# Patient Record
Sex: MALE | Race: WHITE | NOT HISPANIC OR LATINO | Employment: FULL TIME | URBAN - METROPOLITAN AREA
[De-identification: names, ages, dates, MRNs, and addresses within clinical notes are randomized per-mention and may not be internally consistent; named-entity substitution may affect disease eponyms.]

---

## 2017-01-10 ENCOUNTER — OFFICE VISIT (OUTPATIENT)
Dept: OTOLARYNGOLOGY | Facility: CLINIC | Age: 47
End: 2017-01-10
Payer: COMMERCIAL

## 2017-01-10 VITALS
WEIGHT: 195 LBS | BODY MASS INDEX: 25.03 KG/M2 | TEMPERATURE: 97.8 F | HEIGHT: 74 IN | SYSTOLIC BLOOD PRESSURE: 125 MMHG | DIASTOLIC BLOOD PRESSURE: 80 MMHG | HEART RATE: 67 BPM

## 2017-01-10 DIAGNOSIS — J32.0 CHRONIC MAXILLARY SINUSITIS: ICD-10-CM

## 2017-01-10 DIAGNOSIS — J34.2 DEVIATED NASAL SEPTUM: Primary | ICD-10-CM

## 2017-01-10 PROCEDURE — 99243 OFF/OP CNSLTJ NEW/EST LOW 30: CPT | Performed by: OTOLARYNGOLOGY

## 2017-01-10 ASSESSMENT — PAIN SCALES - GENERAL: PAINLEVEL: NO PAIN (0)

## 2017-01-10 NOTE — PROGRESS NOTES
History of Present Illness - Franklin Mejia is a 46 year old male seen in consultation at the request of Dr. Esquivel for persistent trouble with his nasal breathing and concerns about chronic sinusitis. He once saw Dr. Weinberg over 15 years ago and was found to have sinus congestion, but did not pursue intervention. He feels that his face gets pressure, and he gets headaches frequently. He also complains of frequent tinnitus and ear fullness. All of these symptoms are intermittent. He finds they are improved with antihistamines, but prior allergy testing has been negative.    He also feels that his nose was broken several times as a child due to basketball. He has been exercising all right and does not feel that his nasal breathing is particularly limiting presently.    Past Medical History -   Patient Active Problem List   Diagnosis     GERD (gastroesophageal reflux disease)     Overweight (BMI 25.0-29.9)     NEAL (generalized anxiety disorder)     \  Current Medications -   Current outpatient prescriptions:      Multiple Vitamins-Minerals (CENTRUM PO), Take 1 tablet by mouth daily, Disp: , Rfl:     Allergies - No Known Allergies    Social History -   Social History     Social History     Marital Status:      Spouse Name: N/A     Number of Children: N/A     Years of Education: N/A     Social History Main Topics     Smoking status: Former Smoker     Quit date: 08/26/2006     Smokeless tobacco: Current User     Types: Chew      Comment: lives in smoke free household.     Alcohol Use: 0.0 oz/week     0 Standard drinks or equivalent per week      Comment: occasional      Drug Use: No     Sexual Activity:     Partners: Female     Other Topics Concern     Parent/Sibling W/ Cabg, Mi Or Angioplasty Before 65f 55m? No     Social History Narrative       Family History -   Family History   Problem Relation Age of Onset     Lipids Mother      Hypertension Father      Macular Degeneration Father      DIABETES  "Father      CEREBROVASCULAR DISEASE No family hx of      Thyroid Disease No family hx of      Glaucoma No family hx of      CANCER No family hx of      DIABETES Brother        Review of Systems - As per HPI and PMHx, otherwise 7 system review of the head and neck negative. 10+ system review negative.    Physical Exam  /80 mmHg  Pulse 67  Temp(Src) 97.8  F (36.6  C) (Oral)  Ht 1.88 m (6' 2\")  Wt 88.451 kg (195 lb)  BMI 25.03 kg/m2  General - The patient is well nourished and well developed, and appears to have good nutritional status.  Alert and oriented to person and place, answers questions and cooperates with examination appropriately.   Head and Face - Normocephalic and atraumatic, with no gross asymmetry noted of the contour of the facial features.  The facial nerve is intact, with strong symmetric movements.  Voice and Breathing - The patient was breathing comfortably without the use of accessory muscles. There was no wheezing, stridor, or stertor.  The patients voice was clear and strong, and had appropriate pitch and quality.  Ears - Bilateral pinna and EACs with normal appearing overlying skin. Tympanic membrane intact with good mobility on pneumatic otoscopy bilaterally. Bony landmarks of the ossicular chain are normal. The tympanic membranes are normal in appearance. No retraction, perforation, or masses.  No fluid or purulence was seen in the external canal or the middle ear.   Eyes - Extraocular movements intact.  Sclera were not icteric or injected, conjunctiva were pink and moist.  Mouth - Examination of the oral cavity showed pink, healthy oral mucosa. No lesions or ulcerations noted.  The tongue was mobile and midline, and the dentition were in good condition.    Throat - The walls of the oropharynx were smooth, pink, moist, symmetric, and had no lesions or ulcerations.  The tonsillar pillars and soft palate were symmetric.  The uvula was midline on elevation.  Neck - Normal midline " excursion of the laryngotracheal complex during swallowing.  Full range of motion on passive movement.  Palpation of the occipital, submental, submandibular, internal jugular chain, and supraclavicular nodes did not demonstrate any abnormal lymph nodes or masses.  The carotid pulse was palpable bilaterally.  Palpation of the thyroid was soft and smooth, with no nodules or goiter appreciated.  The trachea was mobile and midline.  Nose - External contour demonstrates thickened nasal bones bilaterally but bony dorsum in the midline. Nasal tip deviates leftward severely, and there is depression of the right upper lateral cartilage. Tip support is adequate, but the caudal septum is sharply deviated from left to right. Left nasal airway is better than right.      Assessment - Franklin Mejia is a 46 year old male with traumatic nasal deformity. I believe he would need a septorhinoplasty for best treatment of the nasal obstruction symptoms, but he is apparently not particularly bothered by his nasal breathing at this point. I have requested that he continue nasal steroids for 3 weeks and get a CT Sinus to review together in order to investigate for significant paranasal sinus congestion.       Dr. Hussain Nuñez MD  Otolaryngology  [facility]

## 2017-01-10 NOTE — NURSING NOTE
"Initial /80 mmHg  Pulse 67  Temp(Src) 97.8  F (36.6  C) (Oral)  Ht 1.88 m (6' 2\")  Wt 88.451 kg (195 lb)  BMI 25.03 kg/m2 Estimated body mass index is 25.03 kg/(m^2) as calculated from the following:    Height as of this encounter: 1.88 m (6' 2\").    Weight as of this encounter: 88.451 kg (195 lb). .    Jacey Amanda CMA    "

## 2017-01-10 NOTE — MR AVS SNAPSHOT
After Visit Summary   1/10/2017    Franklin Mejia    MRN: [id number]           Patient Information     Date Of Birth          1970        Visit Information        Provider Department      1/10/2017 4:00 PM Hussain Nuñez MD [facility]        Today's Diagnoses     Deviated nasal septum    -  1     Chronic maxillary sinusitis           Care Instructions    Per Physician's instructions          Follow-ups after your visit        Your next 10 appointments already scheduled     Feb 14, 2017 11:00 AM   New Visit with DOC Cooper   [facility]    [address]  [facility] [address]   [phone]              Future tests that were ordered for you today     Open Future Orders        Priority Expected Expires Ordered    CT Maxillofacial w/o Contrast Routine 1/24/2017 1/10/2018 1/10/2017            Who to contact     If you have questions or need follow up information about today's clinic visit or your schedule please contact [facility] directly at [phone].  Normal or non-critical lab and imaging results will be communicated to you by [employer], letter or phone within 4 business days after the clinic has received the results. If you do not hear from us within 7 days, please contact the clinic through [employer] or phone. If you have a critical or abnormal lab result, we will notify you by phone as soon as possible.  Submit refill requests through [employer] or call your pharmacy and they will forward the refill request to us. Please allow 3 business days for your refill to be completed.          Additional Information About Your Visit        [employer] Information     [employer] gives you secure access to your electronic health record. If you see a primary care provider, you can also send messages to your care team and make appointments. If you have questions, please call your primary care clinic.  If you  "do not have a primary care provider, please call [phone] and they will assist you.        Care EveryWhere ID     This is your Care EveryWhere ID. This could be used by other organizations to access your [place] medical records  [id number]        Your Vitals Were     Pulse Temperature Height BMI (Body Mass Index)          67 97.8  F (36.6  C) (Oral) 1.88 m (6' 2\") 25.03 kg/m2         Blood Pressure from Last 3 Encounters:   01/10/17 125/80   12/21/16 115/67   06/16/16 120/74    Weight from Last 3 Encounters:   01/10/17 88.451 kg (195 lb)   12/21/16 91.173 kg (201 lb)   06/16/16 91.173 kg (201 lb)               Primary Care Provider Office Phone # Fax #    Branden Esquivel [phone]       [place] [address] [facility] [address]        Thank you!     Thank you for choosing [facility]  for your care. Our goal is always to provide you with excellent care. Hearing back from our patients is one way we can continue to improve our services. Please take a few minutes to complete the written survey that you may receive in the mail after your visit with us. Thank you!             Your Updated Medication List - Protect others around you: Learn how to safely use, store and throw away your medicines at www.disposemymeds.org.          This list is accurate as of: 1/10/17  4:52 PM.  Always use your most recent med list.                   Brand Name Dispense Instructions for use    CENTRUM PO      Take 1 tablet by mouth daily         "

## 2017-01-30 ENCOUNTER — HOSPITAL ENCOUNTER (OUTPATIENT)
Dept: CT IMAGING | Facility: CLINIC | Age: 47
Discharge: HOME | End: 2017-01-30
Attending: OTOLARYNGOLOGY | Admitting: OTOLARYNGOLOGY
Payer: COMMERCIAL

## 2017-01-30 DIAGNOSIS — J32.0 CHRONIC MAXILLARY SINUSITIS: ICD-10-CM

## 2017-01-30 PROCEDURE — 70486 CT MAXILLOFACIAL W/O DYE: CPT

## 2017-02-14 ENCOUNTER — OFFICE VISIT (OUTPATIENT)
Dept: PSYCHOLOGY | Facility: CLINIC | Age: 47
End: 2017-02-14
Attending: FAMILY MEDICINE
Payer: COMMERCIAL

## 2017-02-14 DIAGNOSIS — F41.1 GENERALIZED ANXIETY DISORDER: ICD-10-CM

## 2017-02-14 DIAGNOSIS — F98.8 ATTENTION DEFICIT DISORDER WITHOUT HYPERACTIVITY: Primary | ICD-10-CM

## 2017-02-14 PROCEDURE — 90791 PSYCH DIAGNOSTIC EVALUATION: CPT | Performed by: MARRIAGE & FAMILY THERAPIST

## 2017-02-14 ASSESSMENT — PATIENT HEALTH QUESTIONNAIRE - PHQ9: 5. POOR APPETITE OR OVEREATING: MORE THAN HALF THE DAYS

## 2017-02-14 ASSESSMENT — ANXIETY QUESTIONNAIRES
6. BECOMING EASILY ANNOYED OR IRRITABLE: MORE THAN HALF THE DAYS
7. FEELING AFRAID AS IF SOMETHING AWFUL MIGHT HAPPEN: MORE THAN HALF THE DAYS
3. WORRYING TOO MUCH ABOUT DIFFERENT THINGS: NEARLY EVERY DAY
1. FEELING NERVOUS, ANXIOUS, OR ON EDGE: NEARLY EVERY DAY
IF YOU CHECKED OFF ANY PROBLEMS ON THIS QUESTIONNAIRE, HOW DIFFICULT HAVE THESE PROBLEMS MADE IT FOR YOU TO DO YOUR WORK, TAKE CARE OF THINGS AT HOME, OR GET ALONG WITH OTHER PEOPLE: SOMEWHAT DIFFICULT
2. NOT BEING ABLE TO STOP OR CONTROL WORRYING: NEARLY EVERY DAY
GAD7 TOTAL SCORE: 17
5. BEING SO RESTLESS THAT IT IS HARD TO SIT STILL: MORE THAN HALF THE DAYS

## 2017-02-14 NOTE — Clinical Note
Client is getting started in the counseling center and will be working on struggles with Attention Deficit Disorder.  Thank you!

## 2017-02-14 NOTE — MR AVS SNAPSHOT
MRN:[id number]                      After Visit Summary   2/14/2017    Franklin Mejia    MRN: [id number]           Visit Information        Provider Department      2/14/2017 11:00 AM Cherri Wooten [facility]      Your next 10 appointments already scheduled     Mar 01, 2017 12:00 PM CST   Return Visit with Cherri Wooten [facility] ([facility]    [address] [id number] [phone]            Mar 28, 2017  4:00 PM CDT   Return Visit with Cherri Wooten [facility] ([facility])    [address] [id number] [phone]            Apr 13, 2017  4:00 PM CDT   Return Visit with Cherri Wooten [facility] ([facility]    [address] [place] [id number] [phone]              MyChart Information     [employer] gives you secure access to your electronic health record. If you see a primary care provider, you can also send messages to your care team and make appointments. If you have questions, please call your primary care clinic.  If you do not have a primary care provider, please call [phone] and they will assist you.        Care EveryWhere ID     This is your Care EveryWhere ID. This could be used by other organizations to access your [place] medical records  [id number]

## 2017-02-15 ASSESSMENT — PATIENT HEALTH QUESTIONNAIRE - PHQ9: SUM OF ALL RESPONSES TO PHQ QUESTIONS 1-9: 6

## 2017-02-15 ASSESSMENT — ANXIETY QUESTIONNAIRES: GAD7 TOTAL SCORE: 17

## 2017-02-16 ENCOUNTER — FCC EXTENDED DOCUMENTATION (OUTPATIENT)
Dept: PSYCHOLOGY | Facility: CLINIC | Age: 47
End: 2017-02-16

## 2017-02-16 NOTE — PROGRESS NOTES
Adult Intake Structured Interview  Standard Diagnostic Assessment      CLIENT'S NAME: Franklin Mejia  MRN:   [id number]  :   1970  ACCT. NUMBER: [id number]  DATE OF SERVICE: 17      Identifying Information:  Client is a 46 year old, ,  male. Client was referred for counseling by his Primary Care Provider. Client is currently employed full time. Client attended the session alone.       Client's Statement of Presenting Concern:  Client reports the reason for seeking therapy at this time as hig hstress levels and struggles with attention issues.  Client has a past diagnosis of Attention Deficit Disorder.  Client stated that his symptoms have resulted in the following functional impairments: home life with family, organization, relationship(s), self-care and work / vocational responsibilities      History of Presenting Concern:  Client reports that these problem(s) began years ago but more recently started to affect many different aspects of his life. Client has attempted to resolve these concerns in the past through medication, counseling and testing. Client reports that other professional(s) are involved in providing support / services. Client is currently working with his Primary Care Provider.        Social History:  Client reported he grew up in [place], [place]. They were the third born of 6 children. Parents were always .  Clients father passed away 3 years ago. Client reported that his childhood was good. Client described his current relationships with family of origin as okay in general.    Client reported a history of 2 committed relationships or marriages. Client has been  for unknown years. Client reported having 2 children and 2 adult step children. Client identified some stable and  meaningful social connections. Client reported that he has been involved with the legal system. This involved child custody in the past.  Client's highest education level was some college. Client did identify the following learning problems: attention, concentration, reading and writing. There are no ethnic, cultural or Zoroastrian factors that may be relevant for therapy. Client identified his preferred language to be English. Client reported he does not need the assistance of an  or other support involved in therapy. Modifications will not be used to assist communication in therapy. Client did not serve in the .     Client reports family history includes DIABETES in his brother and father; Hypertension in his father; Lipids in his mother; Macular Degeneration in his father. There is no history of CEREBROVASCULAR DISEASE, Thyroid Disease, Glaucoma, or CANCER.    Mental Health History:  Client reported the following biological family members or relatives with mental health issues: Siblings experienced ADHD and Anxiety.  Client previously received the following mental health diagnosis: Anxiety and ADD.  Client has received the following mental health services in the past: counseling, medication(s) from physician / PCP and ADHD testing.  Hospitalizations: None.  Client is currently receiving the following services: physician / PCP.      Chemical Health History:  Client reported no family history of chemical health issues. Client has not received chemical dependency treatment in the past. Client is not currently receiving any chemical dependency treatment. Client reports no problems as a result of their drinking / drug use.      Client Reports:  Client reports using alcohol 2 times per month and has 1-2 drinks at a time. Client first started drinking at age did not specify.  Client reports using tobacco 7 times per day. Client started using tobacco at age did not specify.  Client uses chewing  tobacco..  Client denies using marijuana.  Client reports using caffeine 1 times per day and drinks 1 at a time. Client started using caffeine at age did not specify.  Client denies using street drugs.  Client denies the non-medical use of prescription or over the counter drugs.    CAGE: None of the patient's responses to the CAGE screening were positive / Negative CAGE score   Based on the negative Cage-Aid score and clinical interview there  are not indications of drug or alcohol abuse.    Discussed the general effects of drugs and alcohol on health and well-being. Therapist gave client printed information about the effects of chemical use on his health and well being.      Significant Losses / Trauma / Abuse / Neglect Issues:  There are indications or report of significant loss, trauma, abuse or neglect issues related to: death of father 3 years ago and divorce / relational changes in 2002.    Issues of possible neglect are not present.      Medical Issues:  Client has had a physical exam to rule out medical causes for current symptoms. Date of last physical exam was within the past year. Client was encouraged to follow up with PCP if symptoms were to develop. The client has a [facility] Primary Care Provider, who is named Branden Esquivel. The client reports not having a psychiatrist. Client reports no current medical concerns. The client reports the presence of chronic or episodic pain in the form of general body pain for getting older. The pain level is mild and has a frequency of every so often.. There are not significant nutritional concerns.     Client reports current meds as:   Current Outpatient Prescriptions   Medication Sig     Multiple Vitamins-Minerals (CENTRUM PO) Take 1 tablet by mouth daily     No current facility-administered medications for this visit.        Client Allergies:  No Known Allergies      Medical History:  No past medical history on file.      Medication Adherence:  N/A - Client does  not have prescribed psychiatric medications.    Client was provided recommendation to follow-up with prescribing physician.    Mental Status Assessment:  Appearance:   Appropriate   Eye Contact:   Good   Psychomotor Behavior: Normal   Attitude:   Cooperative   Orientation:   All  Speech  Rate / Production: Normal   Volume:  Normal   Mood:    Anxious Elevated   Affect:    Worrisome   Thought Content:  Clear   Thought Form:  Coherent Logical   Insight:    Good       Review of Symptoms:  Depression: Sleep Energy Concentration  [place]:  No symptoms  Psychosis: No symptoms  Anxiety: Worries Nervousness  Panic:  Sense of Impending Doom  Post Traumatic Stress Disorder: No symptoms  Obsessive Compulsive Disorder: No symptoms  Eating Disorder: No symptoms  ADD / ADHD: Attention Listening Task Completion Organization Distractiblity Forgetful          Safety Issues and Plan for Safety and Risk Management:  Client denies a history of suicidal ideation, suicide attempts, self-injurious behavior, homicidal ideation, homicidal behavior and and other safety concerns  Client denies current fears or concerns for personal safety.  Client denies current or recent suicidal ideation or behaviors.  Client denies current or recent homicidal ideation or behaviors.  Client denies current or recent self injurious behavior or ideation.  Client denies other safety concerns.  Client reports there are firearms in the house. The firearms are secured in a locked space.  A safety and risk management plan has not been developed at this time, however client was given the after-hours number / 911 should there be a change in any of these risk factors.    Client's Strengths and Limitations:  Client identified the following strengths or resources that will help him succeed in counseling: commitment to health and well being, friends / good social support, family support and intelligence. Client identified the following supports: family and friends. Things  that may interfere with the clients success in counseling include:None noted.        Diagnostic Criteria:    A. Excessive anxiety and worry about a number of events or activities (such as work or school performance).   B. The person finds it difficult to control the worry.  C. Select 3 or more symptoms (required for diagnosis). Only one item is required in children.  - Restlessness or feeling keyed up or on edge.   - Being easily fatigued.   - Difficulty concentrating or mind going blank.   - Irritability.   - Muscle tension.   - Sleep disturbance (difficulty falling or staying asleep, or restless unsatisfying sleep).   D. The focus of the anxiety and worry is not confined to features of an Axis I disorder.  E. The anxiety, worry, or physical symptoms cause clinically significant distress or impairment in social, occupational, or other important areas of functioning.   F. The disturbance is not due to the direct physiological effects of a substance (e.g., a drug of abuse, a medication) or a general medical condition (e.g., hyperthyroidism) and does not occur exclusively during a Mood Disorder, a Psychotic Disorder, or a Pervasive Developmental Disorder.   - The aformentioned symptoms began - year(s) ago and occurs 5 days per week and is experienced as moderate.  (1) Inattention: 6 or more of the following symptoms have persisted for at least 6 months to a degree that is inconsistent with developmental level and that negatively impacts directly on social and academic/occupational activities:  - Often fails to give close attention to details or makes careless mistakes in schoolwork, at work, or during other activities  - Often has difficulty sustaining attention in tasks or play activities  - Often does not follow through on instructions and fails to finish schoolwork, chores, or duties in the workplace  - Often has difficulty organizing tasks and activities  - Often avoids, dislikes, or is reluctant to engage in  tasks that require sustained mental effort  - Often loses things necessary for tasks or activities  - Is often easily distractedby extraneous stimuli  - Is often forgetful in daily activities       Functional Status:  Client's symptoms have caused and are causing reduced functional status in the following areas:   Activities of Daily Living   Financial management  Occupational / Vocational   Social / Relational       DSM5 Diagnoses: (Sustained by DSM5 Criteria Listed Above)  Diagnoses: Attention-Deficit/Hyperactivity Disorder 314.00 (F90.0) Predominantly inattentive presentation  300.02 (F41.1) Generalized Anxiety Disorder  Psychosocial & Contextual Factors: Some relational stress and work stress  WHODAS 2.0 (12 item)  This questionnaire asks about difficulties due to health conditions. Health conditions  include  disease or illnesses, other health problems that may be short or long lasting,  injuries, mental health or emotional problems, and problems with alcohol or drugs.      Think back over the past 30 days and answer these questions, thinking about how much  difficulty you had doing the following activities. For each question, please [place] only  one response.     S1 Standing for long periods such as 30 minutes? None = 1   S2 Taking care of household responsibilities? None = 1   S3 Learning a new task, for example, learning how to get to a new place? None = 1   S4 How much of a problem do you have joining community activities (for example, festivals, Yarsanism or other activities) in the same way as anyone else can? None = 1   S5 How much have you been emotionally affected by your health problems? None = 1           In the past 30 days, how much difficulty did you have in:   S6 Concentrating on doing something for ten minutes? Moderate = 3   S7 Walking a long distance such as a kilometer (or equivalent)? None = 1   S8 Washing your whole body? None = 1   S9 Getting dressed? None = 1   S10 Dealing with people you  do not know? None = 1   S11 Maintaining a friendship? None = 1   S12 Your day to day work? None = 1      H1 Overall, in the past 30 days, how many days were these difficulties present? Record number of days 0   H2 In the past 30 days, for how many days were you totally unable to carry out your usual activities or work because of any health condition? Record number of days 0   H3 In the past 30 days, not counting the days that you were totally unable, for how many days did you cut back or reduce your usual activities or work because of any health condition? Record number of days 0          Attendance Agreement:  Client has signed Attendance Agreement:Yes      Preliminary Treatment Plan:  The client reports no currently identified Tenriism, ethnic or cultural issues relevant to therapy.     services are not indicated.    Modifications to assist communication are not indicated.    The concerns identified by the client will be addressed in therapy.    Initial Treatment will focus on: Stress management, self-care and balance.    As a preliminary treatment goal, client will develop more effective coping skills to manage anxiety symptoms and will develop coping skills to effectively manage attention issues.    The focus of initial interventions will be to alleviate anxiety, increase ability to function adaptively, increase coping skills, teach CBT skills, teach communication skills, teach relaxation strategies and teach stress mangement techniques.    The client is receiving treatment / structured support from the following professional(s) / service and treatment. Collaboration will be initiated with: primary care physician.    Referral to another professional/service is not indicated at this time..    A Release of Information is not needed at this time.    Report to child / adult protection services was NA.    Client will have access to their [facility]' medical record.    Cherri Wooten,  TH February 16, 2017

## 2017-03-01 ENCOUNTER — OFFICE VISIT (OUTPATIENT)
Dept: PSYCHOLOGY | Facility: CLINIC | Age: 47
End: 2017-03-01
Payer: COMMERCIAL

## 2017-03-01 DIAGNOSIS — F98.8 ATTENTION DEFICIT DISORDER (ADD) WITHOUT HYPERACTIVITY: Primary | ICD-10-CM

## 2017-03-01 DIAGNOSIS — F41.1 GENERALIZED ANXIETY DISORDER: ICD-10-CM

## 2017-03-01 PROCEDURE — 90834 PSYTX W PT 45 MINUTES: CPT | Performed by: MARRIAGE & FAMILY THERAPIST

## 2017-03-01 ASSESSMENT — ANXIETY QUESTIONNAIRES
5. BEING SO RESTLESS THAT IT IS HARD TO SIT STILL: SEVERAL DAYS
7. FEELING AFRAID AS IF SOMETHING AWFUL MIGHT HAPPEN: SEVERAL DAYS
6. BECOMING EASILY ANNOYED OR IRRITABLE: SEVERAL DAYS
IF YOU CHECKED OFF ANY PROBLEMS ON THIS QUESTIONNAIRE, HOW DIFFICULT HAVE THESE PROBLEMS MADE IT FOR YOU TO DO YOUR WORK, TAKE CARE OF THINGS AT HOME, OR GET ALONG WITH OTHER PEOPLE: SOMEWHAT DIFFICULT
GAD7 TOTAL SCORE: 8
3. WORRYING TOO MUCH ABOUT DIFFERENT THINGS: SEVERAL DAYS
1. FEELING NERVOUS, ANXIOUS, OR ON EDGE: MORE THAN HALF THE DAYS
2. NOT BEING ABLE TO STOP OR CONTROL WORRYING: SEVERAL DAYS

## 2017-03-01 ASSESSMENT — PATIENT HEALTH QUESTIONNAIRE - PHQ9: 5. POOR APPETITE OR OVEREATING: SEVERAL DAYS

## 2017-03-01 NOTE — MR AVS SNAPSHOT
MRN:[id number]                      After Visit Summary   3/1/2017    Franklin Mejia    MRN: [id number]           Visit Information        Provider Department      3/1/2017 12:00 PM Cherri Wooten [facility]      Your next 10 appointments already scheduled     Mar 28, 2017  4:00 PM CDT   Return Visit with Cherri Wooten [facility]    [address] [address] [phone]            Apr 13, 2017  4:00 PM CDT   Return Visit with Cherri Wooten [facility]    [address] [id number] [phone]            May 01, 2017  4:00 PM CDT   Return Visit with Cherri Wooten [facility]    [address] [id number] [phone]              MyChart Information     [employer] gives you secure access to your electronic health record. If you see a primary care provider, you can also send messages to your care team and make appointments. If you have questions, please call your primary care clinic.  If you do not have a primary care provider, please call [phone] and they will assist you.        Care EveryWhere ID     This is your Care EveryWhere ID. This could be used by other organizations to access your [facility] medical records  [id number]

## 2017-03-01 NOTE — PROGRESS NOTES
Progress Note    Client Name: Franklin Mejia  Date: 3-1-17         Service Type: Individual      Session Start Time: 12pm  Session End Time: 1250pm      Session Length: 50min     Session #: 2     Attendees: Client attended alone    Treatment Plan Last Reviewed: 3-1-17  PHQ-9 / NEAL-7 : 3-1-17     DATA      Progress Since Last Session (Related to Symptoms / Goals / Homework):   Symptoms: Improved- Client has been addressing struggles with anxiety and ADD.      Homework: Completed in session      Episode of Care Goals: Minimal progress - ACTION (Actively working towards change); Intervened by reinforcing change plan / affirming steps taken     Current / Ongoing Stressors and Concerns:   -Client has been struggling with ADD and anxiety for years but it has been more on an issue the last year.              -Client has been working on some long term career planning.              -Client has been addressing some minor relational issues with his spouse.       Treatment Objective(s) Addressed in This Session:   Defining goals       Intervention:   Solution Focused: - Client is able to outline some self-care tools as well as personal limits he has.  He has a long term plan for career path.  He is working to address blended family issues at home.          ASSESSMENT: Current Emotional / Mental Status (status of significant symptoms):   Risk status (Self / Other harm or suicidal ideation)   Client denies current fears or concerns for personal safety.   Client denies current or recent suicidal ideation or behaviors.   Client denies current or recent homicidal ideation or behaviors.   Client denies current or recent self injurious behavior or ideation.   Client denies other safety concerns.   A safety and risk management plan has not been developed at this time, however client was given the after-hours number / 911 should there be a change in any of these risk  factors.     Appearance:   Appropriate    Eye Contact:   Good    Psychomotor Behavior: Normal    Attitude:   Cooperative    Orientation:   All   Speech    Rate / Production: Normal     Volume:  Normal    Mood:    Anxious    Affect:    Appropriate    Thought Content:  Clear    Thought Form:  Coherent  Logical    Insight:    Good      Medication Review:   No current psychiatric medications prescribed     Medication Compliance:   NA     Changes in Health Issues:   None reported     Chemical Use Review:   Substance Use: Chemical use reviewed, no active concerns identified      Tobacco Use: No change in amount of tobacco use since last session.  Reviewed information and resources for quitting     Collateral Reports Completed:   Not Applicable    PLAN: (Client Tasks / Therapist Tasks / Other)  Client will return in three weeks and is on the waiting list.  He will focus on his self-care routine.  He will continue to set long term career goals.  He will discuss blended family issues in other sessions.          Cherri Wooten,                                                          ________________________________________________________________________    Treatment Plan    Client's Name: Franklin Mejia  YOB: 1970    Date: 3-1-17    Diagnoses: Attention-Deficit/Hyperactivity Disorder 314.00 (F90.0) Predominantly inattentive presentation  300.02 (F41.1) Generalized Anxiety Disorder  Psychosocial & Contextual Factors: Some relational stress and work stress  WHODAS 2.0 (12 item)  This questionnaire asks about difficulties due to health conditions. Health conditions include disease or illnesses, other health problems that may be short or long lasting, injuries, mental health or emotional problems, and problems with alcohol or drugs.      Think back over the past 30 days and answer these questions, thinking about how much difficulty you had doing the following activities. For each question, please [place]  only one response.      S1 Standing for long periods such as 30 minutes? None = 1   S2 Taking care of household responsibilities? None = 1   S3 Learning a new task, for example, learning how to get to a new place? None = 1   S4 How much of a problem do you have joining community activities (for example, festivals, Voodoo or other activities) in the same way as anyone else can? None = 1   S5 How much have you been emotionally affected by your health problems? None = 1               In the past 30 days, how much difficulty did you have in:   S6 Concentrating on doing something for ten minutes? Moderate = 3   S7 Walking a long distance such as a kilometer (or equivalent)? None = 1   S8 Washing your whole body? None = 1   S9 Getting dressed? None = 1   S10 Dealing with people you do not know? None = 1   S11 Maintaining a friendship? None = 1   S12 Your day to day work? None = 1       H1 Overall, in the past 30 days, how many days were these difficulties present? Record number of days 0   H2 In the past 30 days, for how many days were you totally unable to carry out your usual activities or work because of any health condition? Record number of days 0   H3 In the past 30 days, not counting the days that you were totally unable, for how many days did you cut back or reduce your usual activities or work because of any health condition? Record number of days 0              Referral / Collaboration:  Referral to another professional/service is not indicated at this time..    Anticipated number of session or this episode of care: 4-8      MeasurableTreatment Goal(s) related to diagnosis / functional impairment(s)  Goal 1: Client will address struggles with escalating life stressors.      Objective #A (Client Action)    Client will develop a more concrete self-care plan.    Status: New - Date: 3-1-17     Intervention(s)  Therapist will use CBT and solution focused therapy .    Objective #B  Client will work towards more of  a balanced between home and work.  Status: New - Date: 3-1-17     Intervention(s)  Therapist will use CBT and solution focused therapy .    Objective #C  Client will focus on more future focused career planning.  Status: New - Date: 3-1-17     Intervention(s)  Therapist will use solution focused therapy .          Client has reviewed and agreed to the above plan.      Cherri Wooten, TH  March 1, 2017

## 2017-03-02 ASSESSMENT — ANXIETY QUESTIONNAIRES: GAD7 TOTAL SCORE: 8

## 2017-03-02 ASSESSMENT — PATIENT HEALTH QUESTIONNAIRE - PHQ9: SUM OF ALL RESPONSES TO PHQ QUESTIONS 1-9: 6

## 2017-03-28 ENCOUNTER — OFFICE VISIT (OUTPATIENT)
Dept: PSYCHOLOGY | Facility: CLINIC | Age: 47
End: 2017-03-28
Payer: COMMERCIAL

## 2017-03-28 DIAGNOSIS — F41.1 GENERALIZED ANXIETY DISORDER: Primary | ICD-10-CM

## 2017-03-28 PROCEDURE — 90834 PSYTX W PT 45 MINUTES: CPT | Performed by: MARRIAGE & FAMILY THERAPIST

## 2017-03-28 NOTE — MR AVS SNAPSHOT
MRN:[id number]                      After Visit Summary   3/28/2017    Franklin Mejia    MRN: [id number]           Visit Information        Provider Department      3/28/2017 4:00 PM Cherri Wooten [facility] Generic      Your next 10 appointments already scheduled     Apr 13, 2017  4:00 PM CDT   Return Visit with DOC Cooper   [facility] ([facility]    [address] [place] [id number] [phone]            May 01, 2017  4:00 PM CDT   Return Visit with DOC Cooper   [facility] ([facility])    [address] [place] [id number] [phone]              MyChart Information     [employer] gives you secure access to your electronic health record. If you see a primary care provider, you can also send messages to your care team and make appointments. If you have questions, please call your primary care clinic.  If you do not have a primary care provider, please call [phone] and they will assist you.        Care EveryWhere ID     This is your Care EveryWhere ID. This could be used by other organizations to access your [facility] medical records  [id number]

## 2017-03-29 ASSESSMENT — ANXIETY QUESTIONNAIRES
2. NOT BEING ABLE TO STOP OR CONTROL WORRYING: SEVERAL DAYS
7. FEELING AFRAID AS IF SOMETHING AWFUL MIGHT HAPPEN: NOT AT ALL
IF YOU CHECKED OFF ANY PROBLEMS ON THIS QUESTIONNAIRE, HOW DIFFICULT HAVE THESE PROBLEMS MADE IT FOR YOU TO DO YOUR WORK, TAKE CARE OF THINGS AT HOME, OR GET ALONG WITH OTHER PEOPLE: SOMEWHAT DIFFICULT
3. WORRYING TOO MUCH ABOUT DIFFERENT THINGS: MORE THAN HALF THE DAYS
1. FEELING NERVOUS, ANXIOUS, OR ON EDGE: MORE THAN HALF THE DAYS
GAD7 TOTAL SCORE: 7
6. BECOMING EASILY ANNOYED OR IRRITABLE: SEVERAL DAYS
5. BEING SO RESTLESS THAT IT IS HARD TO SIT STILL: SEVERAL DAYS

## 2017-03-29 ASSESSMENT — PATIENT HEALTH QUESTIONNAIRE - PHQ9: 5. POOR APPETITE OR OVEREATING: NOT AT ALL

## 2017-03-29 NOTE — PROGRESS NOTES
Progress Note    Client Name: Franklin Mejia  Date: 3-28-17         Service Type: Individual      Session Start Time: 4pm  Session End Time: 450pm      Session Length: 50min     Session #: 3     Attendees: Client attended alone    Treatment Plan Last Reviewed: 3-1-17  PHQ-9 / NEAL-7 : 3-28-17     DATA      Progress Since Last Session (Related to Symptoms / Goals / Homework):   Symptoms: Improved- Client has been addressing struggles with anxiety and ADD.  He reports some stress at home but is navigating through it.      Homework: Completed in session      Episode of Care Goals: Minimal progress - ACTION (Actively working towards change); Intervened by reinforcing change plan / affirming steps taken     Current / Ongoing Stressors and Concerns:   -Client has been struggling with ADD and anxiety for years but it has been more of an issue the last year.              -Client has been working on some long term career planning.              -Client has been addressing some minor relational issues with his spouse.  There are often struggles related to different parenting styles and their adult children.       Treatment Objective(s) Addressed in This Session:   Relational/ family stress        Intervention:   Solution Focused: - Client feels he is making the right choice in relation to his children and being a father.  He reports he is doing a lot for self-care and has a lot of positive distraction in his life.  He is interested in more holistic ways of addressing his anxiety through herbal remedies and vitamins.            ASSESSMENT: Current Emotional / Mental Status (status of significant symptoms):   Risk status (Self / Other harm or suicidal ideation)   Client denies current fears or concerns for personal safety.   Client denies current or recent suicidal ideation or behaviors.   Client denies current or recent homicidal ideation or behaviors.   Client denies current  or recent self injurious behavior or ideation.   Client denies other safety concerns.   A safety and risk management plan has not been developed at this time, however client was given the after-hours number / 911 should there be a change in any of these risk factors.     Appearance:   Appropriate    Eye Contact:   Good    Psychomotor Behavior: Normal    Attitude:   Cooperative    Orientation:   All   Speech    Rate / Production: Normal     Volume:  Normal    Mood:    Anxious    Affect:    Appropriate    Thought Content:  Clear    Thought Form:  Coherent  Logical    Insight:    Good      Medication Review:   No current psychiatric medications prescribed     Medication Compliance:   NA     Changes in Health Issues:   None reported     Chemical Use Review:   Substance Use: Chemical use reviewed, no active concerns identified      Tobacco Use: No change in amount of tobacco use since last session.  Reviewed information and resources for quitting     Collateral Reports Completed:   Not Applicable    PLAN: (Client Tasks / Therapist Tasks / Other)  Client will return in three weeks and is on the waiting list.  He will focus on his self-care routine.  He will continue to set long term career goals.  He will focus on blending parenting styles while also being open to discussion.          Cherri Wooten,                                                          ________________________________________________________________________    Treatment Plan    Client's Name: Franklin Mejia  YOB: 1970    Date: 3-1-17    Diagnoses: Attention-Deficit/Hyperactivity Disorder 314.00 (F90.0) Predominantly inattentive presentation  300.02 (F41.1) Generalized Anxiety Disorder  Psychosocial & Contextual Factors: Some relational stress and work stress  WHODAS 2.0 (12 item)  This questionnaire asks about difficulties due to health conditions. Health conditions include disease or illnesses, other health problems that may  be short or long lasting, injuries, mental health or emotional problems, and problems with alcohol or drugs.      Think back over the past 30 days and answer these questions, thinking about how much difficulty you had doing the following activities. For each question, please [place] only one response.      S1 Standing for long periods such as 30 minutes? None = 1   S2 Taking care of household responsibilities? None = 1   S3 Learning a new task, for example, learning how to get to a new place? None = 1   S4 How much of a problem do you have joining community activities (for example, festivals, Alevism or other activities) in the same way as anyone else can? None = 1   S5 How much have you been emotionally affected by your health problems? None = 1               In the past 30 days, how much difficulty did you have in:   S6 Concentrating on doing something for ten minutes? Moderate = 3   S7 Walking a long distance such as a kilometer (or equivalent)? None = 1   S8 Washing your whole body? None = 1   S9 Getting dressed? None = 1   S10 Dealing with people you do not know? None = 1   S11 Maintaining a friendship? None = 1   S12 Your day to day work? None = 1       H1 Overall, in the past 30 days, how many days were these difficulties present? Record number of days 0   H2 In the past 30 days, for how many days were you totally unable to carry out your usual activities or work because of any health condition? Record number of days 0   H3 In the past 30 days, not counting the days that you were totally unable, for how many days did you cut back or reduce your usual activities or work because of any health condition? Record number of days 0              Referral / Collaboration:  Referral to another professional/service is not indicated at this time..    Anticipated number of session or this episode of care: 4-8      MeasurableTreatment Goal(s) related to diagnosis / functional impairment(s)  Goal 1: Client will address  struggles with escalating life stressors.      Objective #A (Client Action)    Client will develop a more concrete self-care plan.    Status: New - Date: 3-1-17     Intervention(s)  Therapist will use CBT and solution focused therapy .    Objective #B  Client will work towards more of a balanced between home and work.  Status: New - Date: 3-1-17     Intervention(s)  Therapist will use CBT and solution focused therapy .    Objective #C  Client will focus on more future focused career planning.  Status: New - Date: 3-1-17     Intervention(s)  Therapist will use solution focused therapy .          Client has reviewed and agreed to the above plan.      Cherri Wooten, TH  March 1, 2017

## 2017-03-30 ASSESSMENT — PATIENT HEALTH QUESTIONNAIRE - PHQ9: SUM OF ALL RESPONSES TO PHQ QUESTIONS 1-9: 5

## 2017-03-30 ASSESSMENT — ANXIETY QUESTIONNAIRES: GAD7 TOTAL SCORE: 7

## 2017-04-13 ENCOUNTER — OFFICE VISIT (OUTPATIENT)
Dept: PSYCHOLOGY | Facility: CLINIC | Age: 47
End: 2017-04-13
Payer: COMMERCIAL

## 2017-04-13 DIAGNOSIS — F41.1 GENERALIZED ANXIETY DISORDER: Primary | ICD-10-CM

## 2017-04-13 PROCEDURE — 90834 PSYTX W PT 45 MINUTES: CPT | Performed by: MARRIAGE & FAMILY THERAPIST

## 2017-04-13 ASSESSMENT — PATIENT HEALTH QUESTIONNAIRE - PHQ9: 5. POOR APPETITE OR OVEREATING: NEARLY EVERY DAY

## 2017-04-13 ASSESSMENT — ANXIETY QUESTIONNAIRES
2. NOT BEING ABLE TO STOP OR CONTROL WORRYING: NEARLY EVERY DAY
7. FEELING AFRAID AS IF SOMETHING AWFUL MIGHT HAPPEN: NEARLY EVERY DAY
5. BEING SO RESTLESS THAT IT IS HARD TO SIT STILL: NEARLY EVERY DAY
3. WORRYING TOO MUCH ABOUT DIFFERENT THINGS: NEARLY EVERY DAY
1. FEELING NERVOUS, ANXIOUS, OR ON EDGE: NEARLY EVERY DAY
6. BECOMING EASILY ANNOYED OR IRRITABLE: MORE THAN HALF THE DAYS
GAD7 TOTAL SCORE: 20
IF YOU CHECKED OFF ANY PROBLEMS ON THIS QUESTIONNAIRE, HOW DIFFICULT HAVE THESE PROBLEMS MADE IT FOR YOU TO DO YOUR WORK, TAKE CARE OF THINGS AT HOME, OR GET ALONG WITH OTHER PEOPLE: VERY DIFFICULT

## 2017-04-13 NOTE — PROGRESS NOTES
Progress Note    Client Name: Franklin Mejia  Date: 4-13-17         Service Type: Individual      Session Start Time: 4pm  Session End Time: 450pm      Session Length: 50min     Session #: 4     Attendees: Client attended alone    Treatment Plan Last Reviewed: 3-1-17  PHQ-9 / NEAL-7 : 4-13-17     DATA      Progress Since Last Session (Related to Symptoms / Goals / Homework):   Symptoms: Improved- Client has been addressing struggles with anxiety and ADD.  He reports some stress at home but is navigating through it.  He has been feeling more flights of anxiety and has been addressing triggers.      Homework: Completed in session      Episode of Care Goals: Satisfactory progress - ACTION (Actively working towards change); Intervened by reinforcing change plan / affirming steps taken     Current / Ongoing Stressors and Concerns:   -Client has been struggling with ADD and anxiety for years but it has been more of an issue the last year.              -Client has been working on some long term career planning.              -Client has been addressing some minor relational issues with his spouse.  There are often struggles related to different parenting styles and their adult children.     -Client reports feeling more flights of anxiety over the last couple of weeks.  He often experiences negative thoughts when anxious and has trouble with sleep.       Treatment Objective(s) Addressed in This Session:   Relational/ family stress   Anxiety      Intervention:   Solution Focused: - Client feels he is making the right choice in relation to his children and being a father.  He reports he is doing a lot for self-care and has a lot of positive distraction in his life.  He is interested in more holistic ways of addressing his anxiety through herbal remedies and vitamins.  He will look into L-tryptophan and melatonin.  He will be assertive and straight forward with his wife about  things he feels confident and passionate about.          ASSESSMENT: Current Emotional / Mental Status (status of significant symptoms):   Risk status (Self / Other harm or suicidal ideation)   Client denies current fears or concerns for personal safety.   Client denies current or recent suicidal ideation or behaviors.   Client denies current or recent homicidal ideation or behaviors.   Client denies current or recent self injurious behavior or ideation.   Client denies other safety concerns.   A safety and risk management plan has not been developed at this time, however client was given the after-hours number / 911 should there be a change in any of these risk factors.     Appearance:   Appropriate    Eye Contact:   Good    Psychomotor Behavior: Normal    Attitude:   Cooperative    Orientation:   All   Speech    Rate / Production: Normal     Volume:  Normal    Mood:    Anxious    Affect:    Appropriate    Thought Content:  Clear    Thought Form:  Coherent  Logical    Insight:    Good      Medication Review:   No current psychiatric medications prescribed     Medication Compliance:   NA     Changes in Health Issues:   None reported     Chemical Use Review:   Substance Use: Chemical use reviewed, no active concerns identified      Tobacco Use: No change in amount of tobacco use since last session.  Reviewed information and resources for quitting     Collateral Reports Completed:   Not Applicable    PLAN: (Client Tasks / Therapist Tasks / Other)  Client will return in three weeks and is on the waiting list.  He will focus on his self-care routine.  He will continue to set long term career goals.  He will focus on blending parenting styles while also being open to discussion.  He will look into L-tryptophan and melatonin.        Cherri Wooten,                                                          ________________________________________________________________________    Treatment Plan    Client's Name:  Tenzinjustinejoseluis Tranemeka  YOB: 1970    Date: 3-1-17    Diagnoses: Attention-Deficit/Hyperactivity Disorder 314.00 (F90.0) Predominantly inattentive presentation  300.02 (F41.1) Generalized Anxiety Disorder  Psychosocial & Contextual Factors: Some relational stress and work stress  WHODAS 2.0 (12 item)  This questionnaire asks about difficulties due to health conditions. Health conditions include disease or illnesses, other health problems that may be short or long lasting, injuries, mental health or emotional problems, and problems with alcohol or drugs.      Think back over the past 30 days and answer these questions, thinking about how much difficulty you had doing the following activities. For each question, please [place] only one response.      S1 Standing for long periods such as 30 minutes? None = 1   S2 Taking care of household responsibilities? None = 1   S3 Learning a new task, for example, learning how to get to a new place? None = 1   S4 How much of a problem do you have joining community activities (for example, festivals, Scientology or other activities) in the same way as anyone else can? None = 1   S5 How much have you been emotionally affected by your health problems? None = 1               In the past 30 days, how much difficulty did you have in:   S6 Concentrating on doing something for ten minutes? Moderate = 3   S7 Walking a long distance such as a kilometer (or equivalent)? None = 1   S8 Washing your whole body? None = 1   S9 Getting dressed? None = 1   S10 Dealing with people you do not know? None = 1   S11 Maintaining a friendship? None = 1   S12 Your day to day work? None = 1       H1 Overall, in the past 30 days, how many days were these difficulties present? Record number of days 0   H2 In the past 30 days, for how many days were you totally unable to carry out your usual activities or work because of any health condition? Record number of days 0   H3 In the past 30 days, not  counting the days that you were totally unable, for how many days did you cut back or reduce your usual activities or work because of any health condition? Record number of days 0              Referral / Collaboration:  Referral to another professional/service is not indicated at this time..    Anticipated number of session or this episode of care: 4-8      MeasurableTreatment Goal(s) related to diagnosis / functional impairment(s)  Goal 1: Client will address struggles with escalating life stressors.      Objective #A (Client Action)    Client will develop a more concrete self-care plan.    Status: New - Date: 3-1-17     Intervention(s)  Therapist will use CBT and solution focused therapy .    Objective #B  Client will work towards more of a balanced between home and work.  Status: New - Date: 3-1-17     Intervention(s)  Therapist will use CBT and solution focused therapy .    Objective #C  Client will focus on more future focused career planning.  Status: New - Date: 3-1-17     Intervention(s)  Therapist will use solution focused therapy .          Client has reviewed and agreed to the above plan.      Cherri Wooten, TH  March 1, 2017

## 2017-04-13 NOTE — MR AVS SNAPSHOT
MRN:[id number]                      After Visit Summary   4/13/2017    Franklin Mejia    MRN: [id number]           Visit Information        Provider Department      4/13/2017 4:00 PM Cherri Wooten [facility] Generic      Your next 10 appointments already scheduled     May 11, 2017 12:00 PM CDT   Return Visit with DOC Cooper   [facility] ([facility]    [address] [address]   [phone]            Jun 06, 2017  4:00 PM CDT   Return Visit with Cherri Wooten [facility] ([facility])    [address] [id number] [phone]            Jun 27, 2017  4:00 PM CDT   Return Visit with Cherri Wooten [facility] ([facility])    [address] [id number] [phone]            Jul 18, 2017  4:00 PM CDT   Return Visit with Cherri Wooten [facility] ([facility])    [address] [id number] [phone]              MyChart Information     [employer] gives you secure access to your electronic health record. If you see a primary care provider, you can also send messages to your care team and make appointments. If you have questions, please call your primary care clinic.  If you do not have a primary care provider, please call [phone] and they will assist you.        Care EveryWhere ID     This is your Care EveryWhere ID. This could be used by other organizations to access your [facility] medical records  [id number]

## 2017-04-14 ASSESSMENT — ANXIETY QUESTIONNAIRES: GAD7 TOTAL SCORE: 20

## 2017-04-14 ASSESSMENT — PATIENT HEALTH QUESTIONNAIRE - PHQ9: SUM OF ALL RESPONSES TO PHQ QUESTIONS 1-9: 6

## 2017-05-11 ENCOUNTER — OFFICE VISIT (OUTPATIENT)
Dept: PSYCHOLOGY | Facility: CLINIC | Age: 47
End: 2017-05-11
Payer: COMMERCIAL

## 2017-05-11 DIAGNOSIS — F41.1 GENERALIZED ANXIETY DISORDER: Primary | ICD-10-CM

## 2017-05-11 DIAGNOSIS — F98.8 ADD (ATTENTION DEFICIT DISORDER) WITHOUT HYPERACTIVITY: ICD-10-CM

## 2017-05-11 PROCEDURE — 90834 PSYTX W PT 45 MINUTES: CPT | Performed by: MARRIAGE & FAMILY THERAPIST

## 2017-05-11 ASSESSMENT — ANXIETY QUESTIONNAIRES
5. BEING SO RESTLESS THAT IT IS HARD TO SIT STILL: MORE THAN HALF THE DAYS
6. BECOMING EASILY ANNOYED OR IRRITABLE: SEVERAL DAYS
7. FEELING AFRAID AS IF SOMETHING AWFUL MIGHT HAPPEN: SEVERAL DAYS
2. NOT BEING ABLE TO STOP OR CONTROL WORRYING: NEARLY EVERY DAY
GAD7 TOTAL SCORE: 16
IF YOU CHECKED OFF ANY PROBLEMS ON THIS QUESTIONNAIRE, HOW DIFFICULT HAVE THESE PROBLEMS MADE IT FOR YOU TO DO YOUR WORK, TAKE CARE OF THINGS AT HOME, OR GET ALONG WITH OTHER PEOPLE: SOMEWHAT DIFFICULT
3. WORRYING TOO MUCH ABOUT DIFFERENT THINGS: NEARLY EVERY DAY
1. FEELING NERVOUS, ANXIOUS, OR ON EDGE: NEARLY EVERY DAY

## 2017-05-11 ASSESSMENT — PATIENT HEALTH QUESTIONNAIRE - PHQ9: 5. POOR APPETITE OR OVEREATING: NEARLY EVERY DAY

## 2017-05-11 NOTE — PROGRESS NOTES
Progress Note    Client Name: Franklin Mejia  Date: 5-11-17         Service Type: Individual      Session Start Time: 12pm  Session End Time: 1250pm      Session Length: 50min     Session #: 5     Attendees: Client attended alone    Treatment Plan Last Reviewed: 3-1-17  PHQ-9 / NEAL-7 : 5-11-17     DATA      Progress Since Last Session (Related to Symptoms / Goals / Homework):   Symptoms: Improved- Client has been addressing struggles with anxiety and ADD.  He reports some stress at home but is navigating through it.  He has started to take L-tryptophan and has noticed some improvement.      Homework: Partially completed      Episode of Care Goals: Satisfactory progress - ACTION (Actively working towards change); Intervened by reinforcing change plan / affirming steps taken     Current / Ongoing Stressors and Concerns:   -Client has been struggling with ADD and anxiety for years but it has been more of an issue the last year.              -Client has been working on some long term career planning.              -Client has been addressing some minor relational issues with his spouse.  There are often struggles related to different parenting styles and their adult children.     -Client reports feeling more flights of anxiety over the last couple of weeks.  He does feel he is able to control the anxiety and get it in a more manageable zone.      Treatment Objective(s) Addressed in This Session:   Relational/ family stress   Anxiety      Intervention:   Solution Focused: - Client feels he is making the right choice in relation to his children and being a father.  He reports he is doing a lot for self-care and has a lot of positive distraction in his life.  He is interested in more holistic ways of addressing his anxiety through herbal remedies and vitamins.  He is taking L-tryptophan.  Client would like to add more physical fitness into his schedule and will start to  use his mountain bike.        ASSESSMENT: Current Emotional / Mental Status (status of significant symptoms):   Risk status (Self / Other harm or suicidal ideation)   Client denies current fears or concerns for personal safety.   Client denies current or recent suicidal ideation or behaviors.   Client denies current or recent homicidal ideation or behaviors.   Client denies current or recent self injurious behavior or ideation.   Client denies other safety concerns.   A safety and risk management plan has not been developed at this time, however client was given the after-hours number / 911 should there be a change in any of these risk factors.     Appearance:   Appropriate    Eye Contact:   Good    Psychomotor Behavior: Normal    Attitude:   Cooperative    Orientation:   All   Speech    Rate / Production: Normal     Volume:  Normal    Mood:    Anxious    Affect:    Appropriate    Thought Content:  Clear    Thought Form:  Coherent  Logical    Insight:    Good      Medication Review:   No current psychiatric medications prescribed     Medication Compliance:   NA     Changes in Health Issues:   None reported     Chemical Use Review:   Substance Use: Chemical use reviewed, no active concerns identified      Tobacco Use: No change in amount of tobacco use since last session.  Reviewed information and resources for quitting     Collateral Reports Completed:   Not Applicable    PLAN: (Client Tasks / Therapist Tasks / Other)  Client will return in two weeks.  He will continue to use L-tryptophan.  He will start to ride his bike a couple of days per week.  He will look for ways he can adjust his schedule to foster more enjoyment with his free time.          Cherri Wooten,                                                          ________________________________________________________________________    Treatment Plan    Client's Name: Franklin Mejia  YOB: 1970    Date: 3-1-17    Diagnoses:  Attention-Deficit/Hyperactivity Disorder 314.00 (F90.0) Predominantly inattentive presentation  300.02 (F41.1) Generalized Anxiety Disorder  Psychosocial & Contextual Factors: Some relational stress and work stress  WHODAS 2.0 (12 item)  This questionnaire asks about difficulties due to health conditions. Health conditions include disease or illnesses, other health problems that may be short or long lasting, injuries, mental health or emotional problems, and problems with alcohol or drugs.      Think back over the past 30 days and answer these questions, thinking about how much difficulty you had doing the following activities. For each question, please [place] only one response.      S1 Standing for long periods such as 30 minutes? None = 1   S2 Taking care of household responsibilities? None = 1   S3 Learning a new task, for example, learning how to get to a new place? None = 1   S4 How much of a problem do you have joining community activities (for example, festivals, Rastafari or other activities) in the same way as anyone else can? None = 1   S5 How much have you been emotionally affected by your health problems? None = 1               In the past 30 days, how much difficulty did you have in:   S6 Concentrating on doing something for ten minutes? Moderate = 3   S7 Walking a long distance such as a kilometer (or equivalent)? None = 1   S8 Washing your whole body? None = 1   S9 Getting dressed? None = 1   S10 Dealing with people you do not know? None = 1   S11 Maintaining a friendship? None = 1   S12 Your day to day work? None = 1       H1 Overall, in the past 30 days, how many days were these difficulties present? Record number of days 0   H2 In the past 30 days, for how many days were you totally unable to carry out your usual activities or work because of any health condition? Record number of days 0   H3 In the past 30 days, not counting the days that you were totally unable, for how many days did you cut back  or reduce your usual activities or work because of any health condition? Record number of days 0              Referral / Collaboration:  Referral to another professional/service is not indicated at this time..    Anticipated number of session or this episode of care: 4-8      MeasurableTreatment Goal(s) related to diagnosis / functional impairment(s)  Goal 1: Client will address struggles with escalating life stressors.      Objective #A (Client Action)    Client will develop a more concrete self-care plan.    Status: New - Date: 3-1-17     Intervention(s)  Therapist will use CBT and solution focused therapy .    Objective #B  Client will work towards more of a balanced between home and work.  Status: New - Date: 3-1-17     Intervention(s)  Therapist will use CBT and solution focused therapy .    Objective #C  Client will focus on more future focused career planning.  Status: New - Date: 3-1-17     Intervention(s)  Therapist will use solution focused therapy .          Client has reviewed and agreed to the above plan.      Cherri Wooten, TH  March 1, 2017

## 2017-05-11 NOTE — MR AVS SNAPSHOT
MRN:[id number]                      After Visit Summary   5/11/2017    Franklin Mejia    MRN: [id number]           Visit Information        Provider Department      5/11/2017 12:00 PM Cherri Wooten [facility] Generic      Your next 10 appointments already scheduled     Jun 06, 2017  4:00 PM CDT   Return Visit with DOC Cooper   [facility] ([facility])    [address] [address]   [phone]            Jun 27, 2017  4:00 PM CDT   Return Visit with Cherri Wooten [facility] ([facility])    [address] [phone] [phone]            Jul 18, 2017  4:00 PM CDT   Return Visit with Cherri Wooten [facility] ([facility])    [address] [phone]            Aug 15, 2017  1:00 PM CDT   Return Visit with Cherri Wooten [facility] ([facility])    [address] [phone] [phone]              MyChart Information     [employer] gives you secure access to your electronic health record. If you see a primary care provider, you can also send messages to your care team and make appointments. If you have questions, please call your primary care clinic.  If you do not have a primary care provider, please call [phone] and they will assist you.        Care EveryWhere ID     This is your Care EveryWhere ID. This could be used by other organizations to access your [place] medical records  [id number]

## 2017-05-12 ASSESSMENT — ANXIETY QUESTIONNAIRES: GAD7 TOTAL SCORE: 16

## 2017-05-12 ASSESSMENT — PATIENT HEALTH QUESTIONNAIRE - PHQ9: SUM OF ALL RESPONSES TO PHQ QUESTIONS 1-9: 7

## 2017-06-06 ENCOUNTER — OFFICE VISIT (OUTPATIENT)
Dept: PSYCHOLOGY | Facility: CLINIC | Age: 47
End: 2017-06-06
Payer: COMMERCIAL

## 2017-06-06 DIAGNOSIS — F41.1 GENERALIZED ANXIETY DISORDER: Primary | ICD-10-CM

## 2017-06-06 PROCEDURE — 90834 PSYTX W PT 45 MINUTES: CPT | Performed by: MARRIAGE & FAMILY THERAPIST

## 2017-06-06 ASSESSMENT — ANXIETY QUESTIONNAIRES
1. FEELING NERVOUS, ANXIOUS, OR ON EDGE: MORE THAN HALF THE DAYS
6. BECOMING EASILY ANNOYED OR IRRITABLE: MORE THAN HALF THE DAYS
IF YOU CHECKED OFF ANY PROBLEMS ON THIS QUESTIONNAIRE, HOW DIFFICULT HAVE THESE PROBLEMS MADE IT FOR YOU TO DO YOUR WORK, TAKE CARE OF THINGS AT HOME, OR GET ALONG WITH OTHER PEOPLE: SOMEWHAT DIFFICULT
2. NOT BEING ABLE TO STOP OR CONTROL WORRYING: NEARLY EVERY DAY
GAD7 TOTAL SCORE: 18
3. WORRYING TOO MUCH ABOUT DIFFERENT THINGS: NEARLY EVERY DAY
7. FEELING AFRAID AS IF SOMETHING AWFUL MIGHT HAPPEN: MORE THAN HALF THE DAYS
5. BEING SO RESTLESS THAT IT IS HARD TO SIT STILL: NEARLY EVERY DAY

## 2017-06-06 ASSESSMENT — PATIENT HEALTH QUESTIONNAIRE - PHQ9: 5. POOR APPETITE OR OVEREATING: NEARLY EVERY DAY

## 2017-06-06 NOTE — MR AVS SNAPSHOT
MRN:[id number]                      After Visit Summary   6/6/2017    Franklin Mejia    MRN: [id number]           Visit Information        Provider Department      6/6/2017 4:00 PM Cherri Wooten [facility] Generic      Your next 10 appointments already scheduled     Jun 27, 2017  4:00 PM CDT   Return Visit with DOC Cooper   [facility] ([facility])    [address] [address]   [phone]            Jul 18, 2017  4:00 PM CDT   Return Visit with Cherri Wooten [facility] ([facility])    [address] [id number] [phone]            Aug 15, 2017  1:00 PM CDT   Return Visit with Cherri Wooten [facility] ([facility])    [address] [id number] [phone]              MyChart Information     [employer] gives you secure access to your electronic health record. If you see a primary care provider, you can also send messages to your care team and make appointments. If you have questions, please call your primary care clinic.  If you do not have a primary care provider, please call [phone] and they will assist you.        Care EveryWhere ID     This is your Care EveryWhere ID. This could be used by other organizations to access your [facility] medical records  [id number]

## 2017-06-06 NOTE — PROGRESS NOTES
Progress Note    Client Name: Franklin Mejia  Date: 6-6-17         Service Type: Individual      Session Start Time: 4pm  Session End Time: 450pm      Session Length: 50min     Session #: 6     Attendees: Client attended alone    Treatment Plan Last Reviewed: 6-6-17  PHQ-9 / NEAL-7 : 6-6-17     DATA      Progress Since Last Session (Related to Symptoms / Goals / Homework):   Symptoms: Improved- Client has been addressing struggles with anxiety and ADD.  He reports some stress at home but is navigating through it.  He has started to take L-tryptophan and has noticed some improvement.  He has been putting more effort into physical fitness.      Homework: Partially completed      Episode of Care Goals: Satisfactory progress - ACTION (Actively working towards change); Intervened by reinforcing change plan / affirming steps taken     Current / Ongoing Stressors and Concerns:   -Client has been struggling with ADD and anxiety for years but it has been more of an issue the last year.              -Client has been working on some long term career planning.              -Client has been addressing some minor relational issues with his spouse.  There are often struggles related to different parenting styles and their adult children.     -Client reports feeling more flights of anxiety over the last couple of weeks.  He does feel he is able to control the anxiety and get it in a more manageable zone.    -Client has been putting more effort into physical fitness.   -Client reports his wife would like to move out of state but he feels he is not ready yet.       Treatment Objective(s) Addressed in This Session:   Relational/ family stress   Anxiety      Intervention:   Solution Focused: - Client feels he is making the right choice in relation to his children and being a father.  He reports he is doing a lot for self-care and has a lot of positive distraction in his life.  He is  interested in more holistic ways of addressing his anxiety through herbal remedies and vitamins.  He is taking L-tryptophan.  Client would like to add more physical fitness into his schedule and will start to use his mountain bike.  He is working on communication styles with his wife and being assertive and setting boundaries.       ASSESSMENT: Current Emotional / Mental Status (status of significant symptoms):   Risk status (Self / Other harm or suicidal ideation)   Client denies current fears or concerns for personal safety.   Client denies current or recent suicidal ideation or behaviors.   Client denies current or recent homicidal ideation or behaviors.   Client denies current or recent self injurious behavior or ideation.   Client denies other safety concerns.   A safety and risk management plan has not been developed at this time, however client was given the after-hours number / 911 should there be a change in any of these risk factors.     Appearance:   Appropriate    Eye Contact:   Good    Psychomotor Behavior: Normal    Attitude:   Cooperative    Orientation:   All   Speech    Rate / Production: Normal     Volume:  Normal    Mood:    Anxious    Affect:    Appropriate    Thought Content:  Clear    Thought Form:  Coherent  Logical    Insight:    Good      Medication Review:   No current psychiatric medications prescribed     Medication Compliance:   NA     Changes in Health Issues:   None reported     Chemical Use Review:   Substance Use: Chemical use reviewed, no active concerns identified      Tobacco Use: No change in amount of tobacco use since last session.  Reviewed information and resources for quitting     Collateral Reports Completed:   Not Applicable    PLAN: (Client Tasks / Therapist Tasks / Other)  Client will return in two weeks.  He will continue to use L-tryptophan.  He will start to ride his bike a couple of days per week.  He will look for ways he can adjust his schedule to foster more  enjoyment with his free time.  He will work on being assertive and setting boundaries.          Cherri CANELADali Wooten,                                                          ________________________________________________________________________    Treatment Plan    Client's Name: Franklin Mejia  YOB: 1970    Date: 3-1-17    Diagnoses: Attention-Deficit/Hyperactivity Disorder 314.00 (F90.0) Predominantly inattentive presentation  300.02 (F41.1) Generalized Anxiety Disorder  Psychosocial & Contextual Factors: Some relational stress and work stress  WHODAS 2.0 (12 item)  This questionnaire asks about difficulties due to health conditions. Health conditions include disease or illnesses, other health problems that may be short or long lasting, injuries, mental health or emotional problems, and problems with alcohol or drugs.      Think back over the past 30 days and answer these questions, thinking about how much difficulty you had doing the following activities. For each question, please [place] only one response.      S1 Standing for long periods such as 30 minutes? None = 1   S2 Taking care of household responsibilities? None = 1   S3 Learning a new task, for example, learning how to get to a new place? None = 1   S4 How much of a problem do you have joining community activities (for example, festivals, Protestant or other activities) in the same way as anyone else can? None = 1   S5 How much have you been emotionally affected by your health problems? None = 1               In the past 30 days, how much difficulty did you have in:   S6 Concentrating on doing something for ten minutes? Moderate = 3   S7 Walking a long distance such as a kilometer (or equivalent)? None = 1   S8 Washing your whole body? None = 1   S9 Getting dressed? None = 1   S10 Dealing with people you do not know? None = 1   S11 Maintaining a friendship? None = 1   S12 Your day to day work? None = 1       H1 Overall, in the past  30 days, how many days were these difficulties present? Record number of days 0   H2 In the past 30 days, for how many days were you totally unable to carry out your usual activities or work because of any health condition? Record number of days 0   H3 In the past 30 days, not counting the days that you were totally unable, for how many days did you cut back or reduce your usual activities or work because of any health condition? Record number of days 0              Referral / Collaboration:  Referral to another professional/service is not indicated at this time..    Anticipated number of session or this episode of care: 4-8      MeasurableTreatment Goal(s) related to diagnosis / functional impairment(s)  Goal 1: Client will address struggles with escalating life stressors.      Objective #A (Client Action)    Client will develop a more concrete self-care plan.    Status: Continued - Date(s):6-6-17     Intervention(s)  Therapist will use CBT and solution focused therapy .    Objective #B  Client will work towards more of a balanced between home and work.  Status: Continued - Date(s):6-6-17     Intervention(s)  Therapist will use CBT and solution focused therapy .    Objective #C  Client will focus on more future focused career planning.  Status: Continued - Date(s):6-6-17     Intervention(s)  Therapist will use solution focused therapy .          Client has reviewed and agreed to the above plan.      Cherri Wooten, TH  March 1, 2017

## 2017-06-07 ASSESSMENT — PATIENT HEALTH QUESTIONNAIRE - PHQ9: SUM OF ALL RESPONSES TO PHQ QUESTIONS 1-9: 7

## 2017-06-07 ASSESSMENT — ANXIETY QUESTIONNAIRES: GAD7 TOTAL SCORE: 18

## 2017-06-27 ENCOUNTER — OFFICE VISIT (OUTPATIENT)
Dept: PSYCHOLOGY | Facility: CLINIC | Age: 47
End: 2017-06-27
Payer: COMMERCIAL

## 2017-06-27 DIAGNOSIS — F90.0 ATTENTION DEFICIT HYPERACTIVITY DISORDER, INATTENTIVE TYPE: Primary | ICD-10-CM

## 2017-06-27 DIAGNOSIS — F41.1 GENERALIZED ANXIETY DISORDER: ICD-10-CM

## 2017-06-27 PROCEDURE — 90834 PSYTX W PT 45 MINUTES: CPT | Performed by: MARRIAGE & FAMILY THERAPIST

## 2017-06-27 ASSESSMENT — ANXIETY QUESTIONNAIRES
2. NOT BEING ABLE TO STOP OR CONTROL WORRYING: NEARLY EVERY DAY
3. WORRYING TOO MUCH ABOUT DIFFERENT THINGS: NEARLY EVERY DAY
1. FEELING NERVOUS, ANXIOUS, OR ON EDGE: NEARLY EVERY DAY
5. BEING SO RESTLESS THAT IT IS HARD TO SIT STILL: MORE THAN HALF THE DAYS
7. FEELING AFRAID AS IF SOMETHING AWFUL MIGHT HAPPEN: NEARLY EVERY DAY
IF YOU CHECKED OFF ANY PROBLEMS ON THIS QUESTIONNAIRE, HOW DIFFICULT HAVE THESE PROBLEMS MADE IT FOR YOU TO DO YOUR WORK, TAKE CARE OF THINGS AT HOME, OR GET ALONG WITH OTHER PEOPLE: VERY DIFFICULT
6. BECOMING EASILY ANNOYED OR IRRITABLE: MORE THAN HALF THE DAYS
GAD7 TOTAL SCORE: 19

## 2017-06-27 ASSESSMENT — PATIENT HEALTH QUESTIONNAIRE - PHQ9: 5. POOR APPETITE OR OVEREATING: NEARLY EVERY DAY

## 2017-06-27 NOTE — PROGRESS NOTES
Progress Note    Client Name: Franklin Mejia  Date: 6-27-17         Service Type: Individual      Session Start Time: 4pm  Session End Time: 450pm      Session Length: 50min     Session #: 7     Attendees: Client attended alone    Treatment Plan Last Reviewed: 6-6-17  PHQ-9 / NEAL-7 : 6-27-17     DATA      Progress Since Last Session (Related to Symptoms / Goals / Homework):   Symptoms: Improved- Client has been addressing struggles with anxiety and ADD.  He reports some stress at home but is navigating through it.  He has started to take L-tryptophan and has noticed some improvement.  He has been putting more effort into physical fitness.  Client is working on communication issues with his wife.      Homework: Partially completed      Episode of Care Goals: Satisfactory progress - ACTION (Actively working towards change); Intervened by reinforcing change plan / affirming steps taken     Current / Ongoing Stressors and Concerns:   -Client has been struggling with ADD and anxiety for years but it has been more of an issue the last year.              -Client has been working on some long term career planning.              -Client has been addressing some minor relational issues with his spouse.  There are often struggles related to different parenting styles and their adult children.     -Client reports feeling more flights of anxiety over the last couple of weeks.  He does feel he is able to control the anxiety and get it in a more manageable zone.    -Client has been putting more effort into physical fitness.   -Client reports his wife would like to move out of state but he feels he is not ready yet.       Treatment Objective(s) Addressed in This Session:   Relational/ family stress   Anxiety      Intervention:   Solution Focused: - Client feels he is making the right choice in relation to his children and being a father.  He reports he is doing a lot for self-care  and has a lot of positive distraction in his life.  He is interested in more holistic ways of addressing his anxiety through herbal remedies and vitamins.  He is taking L-tryptophan.  Client would like to add more physical fitness into his schedule and will start to use his mountain bike.  He is working on communication styles with his wife and being assertive and setting boundaries.  They may do couples therapy in the future.       ASSESSMENT: Current Emotional / Mental Status (status of significant symptoms):   Risk status (Self / Other harm or suicidal ideation)   Client denies current fears or concerns for personal safety.   Client denies current or recent suicidal ideation or behaviors.   Client denies current or recent homicidal ideation or behaviors.   Client denies current or recent self injurious behavior or ideation.   Client denies other safety concerns.   A safety and risk management plan has not been developed at this time, however client was given the after-hours number / 911 should there be a change in any of these risk factors.     Appearance:   Appropriate    Eye Contact:   Good    Psychomotor Behavior: Normal    Attitude:   Cooperative    Orientation:   All   Speech    Rate / Production: Normal     Volume:  Normal    Mood:    Anxious    Affect:    Appropriate    Thought Content:  Clear    Thought Form:  Coherent  Logical    Insight:    Good      Medication Review:   No current psychiatric medications prescribed     Medication Compliance:   NA     Changes in Health Issues:   None reported     Chemical Use Review:   Substance Use: Chemical use reviewed, no active concerns identified      Tobacco Use: No change in amount of tobacco use since last session.  Reviewed information and resources for quitting     Collateral Reports Completed:   Not Applicable    PLAN: (Client Tasks / Therapist Tasks / Other)  Client will return in two weeks.  He will continue to use L-tryptophan.  He will start to ride his  bike a couple of days per week.  He will look for ways he can adjust his schedule to foster more enjoyment with his free time.  He will work on being assertive and setting boundaries.  He will consider couples therapy with his wife.          Cherri Wooten,                                                          ________________________________________________________________________    Treatment Plan    Client's Name: Franklin Mejia  YOB: 1970    Date: 3-1-17    Diagnoses: Attention-Deficit/Hyperactivity Disorder 314.00 (F90.0) Predominantly inattentive presentation  300.02 (F41.1) Generalized Anxiety Disorder  Psychosocial & Contextual Factors: Some relational stress and work stress  WHODAS 2.0 (12 item)  This questionnaire asks about difficulties due to health conditions. Health conditions include disease or illnesses, other health problems that may be short or long lasting, injuries, mental health or emotional problems, and problems with alcohol or drugs.      Think back over the past 30 days and answer these questions, thinking about how much difficulty you had doing the following activities. For each question, please [place] only one response.      S1 Standing for long periods such as 30 minutes? None = 1   S2 Taking care of household responsibilities? None = 1   S3 Learning a new task, for example, learning how to get to a new place? None = 1   S4 How much of a problem do you have joining community activities (for example, festivals, Yazdanism or other activities) in the same way as anyone else can? None = 1   S5 How much have you been emotionally affected by your health problems? None = 1               In the past 30 days, how much difficulty did you have in:   S6 Concentrating on doing something for ten minutes? Moderate = 3   S7 Walking a long distance such as a kilometer (or equivalent)? None = 1   S8 Washing your whole body? None = 1   S9 Getting dressed? None = 1   S10 Dealing  with people you do not know? None = 1   S11 Maintaining a friendship? None = 1   S12 Your day to day work? None = 1       H1 Overall, in the past 30 days, how many days were these difficulties present? Record number of days 0   H2 In the past 30 days, for how many days were you totally unable to carry out your usual activities or work because of any health condition? Record number of days 0   H3 In the past 30 days, not counting the days that you were totally unable, for how many days did you cut back or reduce your usual activities or work because of any health condition? Record number of days 0              Referral / Collaboration:  Referral to another professional/service is not indicated at this time..    Anticipated number of session or this episode of care: 4-8      MeasurableTreatment Goal(s) related to diagnosis / functional impairment(s)  Goal 1: Client will address struggles with escalating life stressors.      Objective #A (Client Action)    Client will develop a more concrete self-care plan.    Status: Continued - Date(s):6-6-17     Intervention(s)  Therapist will use CBT and solution focused therapy .    Objective #B  Client will work towards more of a balanced between home and work.  Status: Continued - Date(s):6-6-17     Intervention(s)  Therapist will use CBT and solution focused therapy .    Objective #C  Client will focus on more future focused career planning.  Status: Continued - Date(s):6-6-17     Intervention(s)  Therapist will use solution focused therapy .          Client has reviewed and agreed to the above plan.      Cherri Wooten, TH  March 1, 2017

## 2017-06-27 NOTE — MR AVS SNAPSHOT
MRN:[id number]                      After Visit Summary   6/27/2017    Franklin Mejia    MRN: [id number]           Visit Information        Provider Department      6/27/2017 4:00 PM Cherri Wooten [facility] Generic      Your next 10 appointments already scheduled     Jul 18, 2017  4:00 PM CDT   Return Visit with Cherri CANELA [facility] ([facility])    [address] [address]   [phone]            Aug 15, 2017  1:00 PM CDT   Return Visit with Cherri Wooten [facility] ([facility])    [address] [id number] [phone]            Sep 07, 2017  1:00 PM CDT   Return Visit with Cherri CANELA [facility] ([facility])    [address] [phone] [phone]              MyChart Information     [employer] gives you secure access to your electronic health record. If you see a primary care provider, you can also send messages to your care team and make appointments. If you have questions, please call your primary care clinic.  If you do not have a primary care provider, please call [phone] and they will assist you.        Care EveryWhere ID     This is your Care EveryWhere ID. This could be used by other organizations to access your [facility] medical records  [id number]        Equal Access to Services     GAEL SILVER AH: Hadii angie jimenezo Sodorothyali, waaxda luqadaha, qaybta kaalmada adeegyada, june anaya. So [facility] [phone].    ATENCIÓN: Si habla español, tiene a bajwa disposición servicios gratuitos de asistencia lingüística. Llame al [phone].    We comply with applicable federal civil rights laws and [place] laws. We do not discriminate on the basis of race, color, national origin, age,  disability sex, sexual orientation or gender identity.

## 2017-06-28 ASSESSMENT — ANXIETY QUESTIONNAIRES: GAD7 TOTAL SCORE: 19

## 2017-06-28 ASSESSMENT — PATIENT HEALTH QUESTIONNAIRE - PHQ9: SUM OF ALL RESPONSES TO PHQ QUESTIONS 1-9: 6

## 2017-07-18 ENCOUNTER — OFFICE VISIT (OUTPATIENT)
Dept: PSYCHOLOGY | Facility: CLINIC | Age: 47
End: 2017-07-18
Payer: COMMERCIAL

## 2017-07-18 DIAGNOSIS — F41.1 GENERALIZED ANXIETY DISORDER: Primary | ICD-10-CM

## 2017-07-18 DIAGNOSIS — F90.0 ATTENTION DEFICIT HYPERACTIVITY DISORDER, INATTENTIVE TYPE: ICD-10-CM

## 2017-07-18 PROCEDURE — 90834 PSYTX W PT 45 MINUTES: CPT | Performed by: MARRIAGE & FAMILY THERAPIST

## 2017-07-18 ASSESSMENT — ANXIETY QUESTIONNAIRES
7. FEELING AFRAID AS IF SOMETHING AWFUL MIGHT HAPPEN: MORE THAN HALF THE DAYS
GAD7 TOTAL SCORE: 14
1. FEELING NERVOUS, ANXIOUS, OR ON EDGE: MORE THAN HALF THE DAYS
6. BECOMING EASILY ANNOYED OR IRRITABLE: MORE THAN HALF THE DAYS
5. BEING SO RESTLESS THAT IT IS HARD TO SIT STILL: MORE THAN HALF THE DAYS
3. WORRYING TOO MUCH ABOUT DIFFERENT THINGS: MORE THAN HALF THE DAYS
IF YOU CHECKED OFF ANY PROBLEMS ON THIS QUESTIONNAIRE, HOW DIFFICULT HAVE THESE PROBLEMS MADE IT FOR YOU TO DO YOUR WORK, TAKE CARE OF THINGS AT HOME, OR GET ALONG WITH OTHER PEOPLE: SOMEWHAT DIFFICULT
2. NOT BEING ABLE TO STOP OR CONTROL WORRYING: MORE THAN HALF THE DAYS

## 2017-07-18 ASSESSMENT — PATIENT HEALTH QUESTIONNAIRE - PHQ9: 5. POOR APPETITE OR OVEREATING: MORE THAN HALF THE DAYS

## 2017-07-18 NOTE — MR AVS SNAPSHOT
MRN:[id number]                      After Visit Summary   7/18/2017    Franklin Mejia    MRN: [id number]           Visit Information        Provider Department      7/18/2017 4:00 PM Cherri Wooten [facility] Generic      Your next 10 appointments already scheduled     Aug 15, 2017  1:00 PM CDT   Return Visit with Cherri CANELA [facility] ([facility])    [address] [address]   [phone]            Sep 07, 2017  1:00 PM CDT   Return Visit with Cherri Wooten [facility] ([facility])    [address] [id number] [phone]            Sep 28, 2017  3:00 PM CDT   Return Visit with Cherri Wooten [facility] ([facility])    [address] [id number] [phone]              MyChart Information     [employer] gives you secure access to your electronic health record. If you see a primary care provider, you can also send messages to your care team and make appointments. If you have questions, please call your primary care clinic.  If you do not have a primary care provider, please call [phone] and they will assist you.        Care EveryWhere ID     This is your Care EveryWhere ID. This could be used by other organizations to access your [facility] medical records  [id number]        Equal Access to Services     GAEL SILVER AH: Hadii angie jimenezo Sodorothyali, waaxda luqadaha, qaybta kaalmada adeegyada, june anaya. So [facility] [phone].    ATENCIÓN: Si habla español, tiene a bajwa disposición servicios gratuitos de asistencia lingüística. Llame al [phone].    We comply with applicable federal civil rights laws and [place] laws. We do not discriminate on the basis of race, color, national origin, age,  disability sex, sexual orientation or gender identity.

## 2017-07-18 NOTE — PROGRESS NOTES
Progress Note    Client Name: Franklin Mejia  Date: 7-18-17         Service Type: Individual      Session Start Time: 4pm  Session End Time: 450pm      Session Length: 50min     Session #: 8     Attendees: Client attended alone    Treatment Plan Last Reviewed: 6-6-17  PHQ-9 / NEAL-7 : 7-18-17     DATA      Progress Since Last Session (Related to Symptoms / Goals / Homework):   Symptoms: Improved- Client has been addressing struggles with anxiety and ADD.  He reports some stress at home but is navigating through it.  He has started to take L-tryptophan and has noticed some improvement.  He has been putting more effort into physical fitness.  Client is working on communication issues with his wife.  They will be starting couples therapy.      Homework: Achieved / completed to satisfaction      Episode of Care Goals: Satisfactory progress - ACTION (Actively working towards change); Intervened by reinforcing change plan / affirming steps taken     Current / Ongoing Stressors and Concerns:   -Client has been struggling with ADD and anxiety for years but it has been more of an issue the last year.              -Client has been working on some long term career planning.              -Client has been addressing some minor relational issues with his spouse.  There are often struggles related to different parenting styles and their adult children.     -Client reports feeling more flights of anxiety over the last couple of weeks.  He does feel he is able to control the anxiety and get it in a more manageable zone.    -Client has been putting more effort into physical fitness.   -Client reports his wife would like to move out of state but he feels he is not ready yet.  They will be starting couples therapy to address some of these issues.   -Client is considering some new activities/ job opportunities for the Fall.       Treatment Objective(s) Addressed in This  Session:   Relational/ family stress   Anxiety      Intervention:   Solution Focused: - Client and wife will start couples therapy.  He is looking at some new part time job opportunities and self-care options for the fall.       ASSESSMENT: Current Emotional / Mental Status (status of significant symptoms):   Risk status (Self / Other harm or suicidal ideation)   Client denies current fears or concerns for personal safety.   Client denies current or recent suicidal ideation or behaviors.   Client denies current or recent homicidal ideation or behaviors.   Client denies current or recent self injurious behavior or ideation.   Client denies other safety concerns.   A safety and risk management plan has not been developed at this time, however client was given the after-hours number / 911 should there be a change in any of these risk factors.     Appearance:   Appropriate    Eye Contact:   Good    Psychomotor Behavior: Normal    Attitude:   Cooperative    Orientation:   All   Speech    Rate / Production: Normal     Volume:  Normal    Mood:    Anxious    Affect:    Appropriate    Thought Content:  Clear    Thought Form:  Coherent  Logical    Insight:    Good      Medication Review:   No current psychiatric medications prescribed     Medication Compliance:   NA     Changes in Health Issues:   None reported     Chemical Use Review:   Substance Use: Chemical use reviewed, no active concerns identified      Tobacco Use: No change in amount of tobacco use since last session.  Reviewed information and resources for quitting     Collateral Reports Completed:   Not Applicable    PLAN: (Client Tasks / Therapist Tasks / Other)  Client will return in two weeks.  He will continue to use L-tryptophan.  He will start to ride his bike a couple of days per week.  He will look for ways he can adjust his schedule to foster more enjoyment with his free time.  He will work on being assertive and setting boundaries.  He will start couples  therapy with his wife.          Cherri Wooten,                                                          ________________________________________________________________________    Treatment Plan    Client's Name: Franklin Mejia  YOB: 1970    Date: 3-1-17    Diagnoses: Attention-Deficit/Hyperactivity Disorder 314.00 (F90.0) Predominantly inattentive presentation  300.02 (F41.1) Generalized Anxiety Disorder  Psychosocial & Contextual Factors: Some relational stress and work stress  WHODAS 2.0 (12 item)  This questionnaire asks about difficulties due to health conditions. Health conditions include disease or illnesses, other health problems that may be short or long lasting, injuries, mental health or emotional problems, and problems with alcohol or drugs.      Think back over the past 30 days and answer these questions, thinking about how much difficulty you had doing the following activities. For each question, please [place] only one response.      S1 Standing for long periods such as 30 minutes? None = 1   S2 Taking care of household responsibilities? None = 1   S3 Learning a new task, for example, learning how to get to a new place? None = 1   S4 How much of a problem do you have joining community activities (for example, festivals, Catholic or other activities) in the same way as anyone else can? None = 1   S5 How much have you been emotionally affected by your health problems? None = 1               In the past 30 days, how much difficulty did you have in:   S6 Concentrating on doing something for ten minutes? Moderate = 3   S7 Walking a long distance such as a kilometer (or equivalent)? None = 1   S8 Washing your whole body? None = 1   S9 Getting dressed? None = 1   S10 Dealing with people you do not know? None = 1   S11 Maintaining a friendship? None = 1   S12 Your day to day work? None = 1       H1 Overall, in the past 30 days, how many days were these difficulties present? Record  number of days 0   H2 In the past 30 days, for how many days were you totally unable to carry out your usual activities or work because of any health condition? Record number of days 0   H3 In the past 30 days, not counting the days that you were totally unable, for how many days did you cut back or reduce your usual activities or work because of any health condition? Record number of days 0              Referral / Collaboration:  Referral to another professional/service is not indicated at this time..    Anticipated number of session or this episode of care: 4-8      MeasurableTreatment Goal(s) related to diagnosis / functional impairment(s)  Goal 1: Client will address struggles with escalating life stressors.      Objective #A (Client Action)    Client will develop a more concrete self-care plan.    Status: Continued - Date(s):6-6-17     Intervention(s)  Therapist will use CBT and solution focused therapy .    Objective #B  Client will work towards more of a balanced between home and work.  Status: Continued - Date(s):6-6-17     Intervention(s)  Therapist will use CBT and solution focused therapy .    Objective #C  Client will focus on more future focused career planning.  Status: Continued - Date(s):6-6-17     Intervention(s)  Therapist will use solution focused therapy .          Client has reviewed and agreed to the above plan.      Cherri Wooten, TH  March 1, 2017

## 2017-07-19 ASSESSMENT — PATIENT HEALTH QUESTIONNAIRE - PHQ9: SUM OF ALL RESPONSES TO PHQ QUESTIONS 1-9: 5

## 2017-07-19 ASSESSMENT — ANXIETY QUESTIONNAIRES: GAD7 TOTAL SCORE: 14

## 2017-08-15 ENCOUNTER — OFFICE VISIT (OUTPATIENT)
Dept: PSYCHOLOGY | Facility: CLINIC | Age: 47
End: 2017-08-15
Payer: COMMERCIAL

## 2017-08-15 DIAGNOSIS — F90.0 ATTENTION DEFICIT HYPERACTIVITY DISORDER, INATTENTIVE TYPE: ICD-10-CM

## 2017-08-15 DIAGNOSIS — F41.1 GENERALIZED ANXIETY DISORDER: Primary | ICD-10-CM

## 2017-08-15 PROCEDURE — 90834 PSYTX W PT 45 MINUTES: CPT | Performed by: MARRIAGE & FAMILY THERAPIST

## 2017-08-15 NOTE — PROGRESS NOTES
Progress Note    Client Name: Franklin Mejia  Date: 8-15-17         Service Type: Individual      Session Start Time: 1pm  Session End Time: 150pm      Session Length: 50min     Session #: 9     Attendees: Client attended alone    Treatment Plan Last Reviewed: 6-6-17  PHQ-9 / NEAL-7 : 7-18-17     DATA      Progress Since Last Session (Related to Symptoms / Goals / Homework):   Symptoms: Improved- Client has been addressing struggles with anxiety and ADD.  He reports some stress at home but is navigating through it.  He has started to take L-tryptophan and has noticed some improvement.  He has been putting more effort into physical fitness.  Client is working on communication issues with his wife.  They will be starting couples therapy.      Homework: Achieved / completed to satisfaction      Episode of Care Goals: Satisfactory progress - ACTION (Actively working towards change); Intervened by reinforcing change plan / affirming steps taken     Current / Ongoing Stressors and Concerns:   -Client has been struggling with ADD and anxiety for years but it has been more of an issue the last year.              -Client has been working on some long term career planning.              -Client has been addressing some minor relational issues with his spouse.  There are often struggles related to different parenting styles and their adult children.     -Client reports balancing schedule and developing a plan for fall.     -Client has been putting more effort into physical fitness.  He may be doing a crossfit class.     -Client reports his wife would like to move out of state but he feels he is not ready yet.  They will be starting couples therapy to address some of these issues.        Treatment Objective(s) Addressed in This Session:   Relational/ family stress   Anxiety      Intervention:   Solution Focused: - Client and wife will start couples therapy.  He is looking at  some new part time job opportunities and self-care options for the fall.  He may start crossfit and has been looking at other ways to get his personal needs met.       ASSESSMENT: Current Emotional / Mental Status (status of significant symptoms):   Risk status (Self / Other harm or suicidal ideation)   Client denies current fears or concerns for personal safety.   Client denies current or recent suicidal ideation or behaviors.   Client denies current or recent homicidal ideation or behaviors.   Client denies current or recent self injurious behavior or ideation.   Client denies other safety concerns.   A safety and risk management plan has not been developed at this time, however client was given the after-hours number / 911 should there be a change in any of these risk factors.     Appearance:   Appropriate    Eye Contact:   Good    Psychomotor Behavior: Normal    Attitude:   Cooperative    Orientation:   All   Speech    Rate / Production: Normal     Volume:  Normal    Mood:    Anxious    Affect:    Appropriate    Thought Content:  Clear    Thought Form:  Coherent  Logical    Insight:    Good      Medication Review:   No current psychiatric medications prescribed     Medication Compliance:   NA     Changes in Health Issues:   None reported     Chemical Use Review:   Substance Use: Chemical use reviewed, no active concerns identified      Tobacco Use: No change in amount of tobacco use since last session.  Reviewed information and resources for quitting     Collateral Reports Completed:   Not Applicable    PLAN: (Client Tasks / Therapist Tasks / Other)  Client will return in two weeks.  He will continue to use L-tryptophan.  He will start to ride his bike a couple of days per week.  He will look for ways he can adjust his schedule to foster more enjoyment with his free time.  He will work on being assertive and setting boundaries.  He will start couples therapy with his wife.          Cherri Wooten, TH                                                          ________________________________________________________________________    Treatment Plan    Client's Name: Franklin Mejia  YOB: 1970    Date: 3-1-17    Diagnoses: Attention-Deficit/Hyperactivity Disorder 314.00 (F90.0) Predominantly inattentive presentation  300.02 (F41.1) Generalized Anxiety Disorder  Psychosocial & Contextual Factors: Some relational stress and work stress  WHODAS 2.0 (12 item)  This questionnaire asks about difficulties due to health conditions. Health conditions include disease or illnesses, other health problems that may be short or long lasting, injuries, mental health or emotional problems, and problems with alcohol or drugs.      Think back over the past 30 days and answer these questions, thinking about how much difficulty you had doing the following activities. For each question, please [place] only one response.      S1 Standing for long periods such as 30 minutes? None = 1   S2 Taking care of household responsibilities? None = 1   S3 Learning a new task, for example, learning how to get to a new place? None = 1   S4 How much of a problem do you have joining community activities (for example, festivals, Islam or other activities) in the same way as anyone else can? None = 1   S5 How much have you been emotionally affected by your health problems? None = 1               In the past 30 days, how much difficulty did you have in:   S6 Concentrating on doing something for ten minutes? Moderate = 3   S7 Walking a long distance such as a kilometer (or equivalent)? None = 1   S8 Washing your whole body? None = 1   S9 Getting dressed? None = 1   S10 Dealing with people you do not know? None = 1   S11 Maintaining a friendship? None = 1   S12 Your day to day work? None = 1       H1 Overall, in the past 30 days, how many days were these difficulties present? Record number of days 0   H2 In the past 30 days, for how many days were  you totally unable to carry out your usual activities or work because of any health condition? Record number of days 0   H3 In the past 30 days, not counting the days that you were totally unable, for how many days did you cut back or reduce your usual activities or work because of any health condition? Record number of days 0              Referral / Collaboration:  Referral to another professional/service is not indicated at this time..    Anticipated number of session or this episode of care: 4-8      MeasurableTreatment Goal(s) related to diagnosis / functional impairment(s)  Goal 1: Client will address struggles with escalating life stressors.      Objective #A (Client Action)    Client will develop a more concrete self-care plan.    Status: Continued - Date(s):6-6-17     Intervention(s)  Therapist will use CBT and solution focused therapy .    Objective #B  Client will work towards more of a balanced between home and work.  Status: Continued - Date(s):6-6-17     Intervention(s)  Therapist will use CBT and solution focused therapy .    Objective #C  Client will focus on more future focused career planning.  Status: Continued - Date(s):6-6-17     Intervention(s)  Therapist will use solution focused therapy .          Client has reviewed and agreed to the above plan.      Cherri Wooten, TH  March 1, 2017

## 2017-08-15 NOTE — MR AVS SNAPSHOT
MRN:[id number]                      After Visit Summary   8/15/2017    Franklin Mejia    MRN: [id number]           Visit Information        Provider Department      8/15/2017 1:00 PM Sugar Cherri CANELA [facility] Generic      Your next 10 appointments already scheduled     Sep 07, 2017  1:00 PM CDT   Return Visit with DOC Cooper   [facility]    [address] [id number] [phone]            Sep 28, 2017  3:00 PM CDT   Return Visit with DOC Cooper   [facility]    [address] [facility] [address]  [facility] [address] [phone]              MyChart Information     [employer] gives you secure access to your electronic health record. If you see a primary care provider, you can also send messages to your care team and make appointments. If you have questions, please call your primary care clinic.  If you do not have a primary care provider, please call [phone] and they will assist you.        Care EveryWhere ID     This is your Care EveryWhere ID. This could be used by other organizations to access your [place] medical records  [id number]        Equal Access to Services     GAEL SILVER : Mino jimenezo Soanuel, waaxda luqadaha, qaybta kaalmada adeegdustinda, june anaya. So [facility] [phone].    ATENCIÓN: Si habla español, tiene a bajwa disposición servicios gratuitos de asistencia lingüística. Llame al [phone].    We comply with applicable federal civil rights laws and [place] laws. We do not discriminate on the basis of race, color, national origin, age, disability sex, sexual orientation or gender identity.

## 2017-08-22 ENCOUNTER — OFFICE VISIT (OUTPATIENT)
Dept: OPTOMETRY | Facility: CLINIC | Age: 47
End: 2017-08-22
Payer: COMMERCIAL

## 2017-08-22 DIAGNOSIS — H52.13 MYOPIA OF BOTH EYES WITH ASTIGMATISM AND PRESBYOPIA: Primary | ICD-10-CM

## 2017-08-22 DIAGNOSIS — H52.203 MYOPIA OF BOTH EYES WITH ASTIGMATISM AND PRESBYOPIA: Primary | ICD-10-CM

## 2017-08-22 DIAGNOSIS — H52.4 MYOPIA OF BOTH EYES WITH ASTIGMATISM AND PRESBYOPIA: Primary | ICD-10-CM

## 2017-08-22 PROCEDURE — 92015 DETERMINE REFRACTIVE STATE: CPT | Performed by: OPTOMETRIST

## 2017-08-22 PROCEDURE — 92014 COMPRE OPH EXAM EST PT 1/>: CPT | Performed by: OPTOMETRIST

## 2017-08-22 ASSESSMENT — REFRACTION_CURRENTRX
OS_DIAMETER: 14.5
OS_AXIS: 080
OS_SPHERE: -6.50
OD_DIAMETER: 14.5
OD_CYLINDER: -2.25
OD_BASECURVE: 8.50
OD_SPHERE: -6.50
OS_CYLINDER: -1.25
OS_BASECURVE: 8.50
OD_AXIS: 090

## 2017-08-22 ASSESSMENT — REFRACTION_WEARINGRX
OD_AXIS: 180
OS_ADD: +1.50
OS_CYLINDER: +1.75
OD_CYLINDER: +2.75
OD_SPHERE: -9.75
OS_AXIS: 170
OS_SPHERE: -8.75
OD_ADD: +1.50

## 2017-08-22 ASSESSMENT — VISUAL ACUITY
OS_CC: 20/40
METHOD: SNELLEN - LINEAR
OD_CC: 20/40 -1
OS_CC+: -1
CORRECTION_TYPE: CONTACTS
OD_CC: 20/30
OS_CC: 20/20
OD_SC: CF @ 3'
OS_SC: 20/400

## 2017-08-22 ASSESSMENT — EXTERNAL EXAM - RIGHT EYE: OD_EXAM: NORMAL

## 2017-08-22 ASSESSMENT — SLIT LAMP EXAM - LIDS
COMMENTS: NORMAL
COMMENTS: NORMAL

## 2017-08-22 ASSESSMENT — TONOMETRY
OS_IOP_MMHG: 18
IOP_METHOD: TONOPEN
OD_IOP_MMHG: 18

## 2017-08-22 ASSESSMENT — REFRACTION_MANIFEST
OD_CYLINDER: +2.75
OS_CYLINDER: +1.75
OS_AXIS: 170
OD_AXIS: 180
OS_SPHERE: -8.50
OD_SPHERE: -9.50
OS_ADD: +1.75
OD_ADD: +1.75

## 2017-08-22 ASSESSMENT — CUP TO DISC RATIO
OD_RATIO: 0.5
OS_RATIO: 0.5

## 2017-08-22 ASSESSMENT — CONF VISUAL FIELD
OS_NORMAL: 1
OD_NORMAL: 1

## 2017-08-22 ASSESSMENT — EXTERNAL EXAM - LEFT EYE: OS_EXAM: NORMAL

## 2017-08-22 NOTE — MR AVS SNAPSHOT
After Visit Summary   8/22/2017    Franklin Mejia    MRN: [id number]           Patient Information     Date Of Birth          1970        Visit Information        Provider Department      8/22/2017 3:00 PM Kaelyn Stapleton OD [facility]        Today's Diagnoses     Myopia of both eyes with astigmatism and presbyopia    -  1      Care Instructions        A final glasses prescription was given.  Allow time for adaptation.  The glasses may cause dizziness and affect depth perception for awhile.  Didn't do contact lens fitting today, can order off current prescription until 4/1/18  Return to clinic 1 year for Comprehensive Vision Exam      Kaelyn Stapleton O.D  [facility]  [address] [place] [address]. [place]  [id number] [phone]                  Follow-ups after your visit        Follow-up notes from your care team     Return in about 1 year (around 8/22/2018) for Eye Exam.      Your next 10 appointments already scheduled     Sep 07, 2017  1:00 PM CDT   Return Visit with DOC Cooper   [facility] ([address] [id number] [phone]            Sep 28, 2017  3:00 PM CDT   Return Visit with DOC Cooper   [facility] ([address] [id number] [phone]              Who to contact     If you have questions or need follow up information about today's clinic visit or your schedule please contact [facility] directly at [phone].  Normal or non-critical lab and imaging results will be communicated to you by MyChart, letter or phone within 4 business days after the clinic has received the results. If you do not hear from us within 7 days, please contact the clinic through MyChart or phone. If you have a critical or abnormal lab result, we will notify you by phone as  soon as possible.  Submit refill requests through [employer] or call your pharmacy and they will forward the refill request to us. Please allow 3 business days for your refill to be completed.          Additional Information About Your Visit        [employer] Information     [employer] gives you secure access to your electronic health record. If you see a primary care provider, you can also send messages to your care team and make appointments. If you have questions, please call your primary care clinic.  If you do not have a primary care provider, please call [phone] and they will assist you.        Care EveryWhere ID     This is your Care EveryWhere ID. This could be used by other organizations to access your [place] medical records  [id number]         Blood Pressure from Last 3 Encounters:   01/10/17 125/80   12/21/16 115/67   06/16/16 120/74    Weight from Last 3 Encounters:   01/10/17 88.5 kg (195 lb)   12/21/16 91.2 kg (201 lb)   06/16/16 91.2 kg (201 lb)              We Performed the Following     EYE EXAM (SIMPLE-NONBILLABLE)     REFRACTION        Primary Care Provider Office Phone # Fax #    Branden Esquivel [phone]       [address] [facility] [address]        Equal Access to Services     GAEL SILVER : Hadii aad ku hadasho Soomaali, waaxda luqadaha, qaybta kaalmada adeegyada, waxay jerelin haybenn jaden anaya. So [facility] [phone].    ATENCIÓN: Si habla español, tiene a bajwa disposición servicios gratuitos de asistencia lingüística. Llame al [phone].    We comply with applicable federal civil rights laws and [place] laws. We do not discriminate on the basis of race, color, national origin, age, disability sex, sexual orientation or gender identity.            Thank you!     Thank you for choosing [facility]  for your care. Our goal is always to provide you with excellent care. Hearing back from our patients is one way we can continue to improve  our services. Please take a few minutes to complete the written survey that you may receive in the mail after your visit with us. Thank you!             Your Updated Medication List - Protect others around you: Learn how to safely use, store and throw away your medicines at www.disposemymeds.org.          This list is accurate as of: 8/22/17  4:19 PM.  Always use your most recent med list.                   Brand Name Dispense Instructions for use Diagnosis    CENTRUM PO      Take 1 tablet by mouth daily

## 2017-08-22 NOTE — PATIENT INSTRUCTIONS
A final glasses prescription was given.  Allow time for adaptation.  The glasses may cause dizziness and affect depth perception for awhile.  Didn't do contact lens fitting today, can order off current prescription until 4/1/18  Return to clinic 1 year for Comprehensive Vision Exam      Kaelyn Stapleton O.D  [facility]  [address] [place] [address]. [place]  [place]  [id number]    [phone]

## 2017-08-22 NOTE — PROGRESS NOTES
Chief Complaint   Patient presents with     COMPREHENSIVE EYE EXAM      Accompanied by self  Last Eye Exam: 1 year ago  Dilated Previously: Yes    What are you currently using to see?  glasses and contacts, rx is good til 4-1-2018, didn't bring glasses.       Distance Vision Acuity: Satisfied with vision    Near Vision Acuity: Not satisfied     Eye Comfort: good  Do you use eye drops? 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       OBJECTIVE: See Ophthalmology exam    ASSESSMENT:    ICD-10-CM    1. Myopia of both eyes with astigmatism and presbyopia H52.13 EYE EXAM (SIMPLE-NONBILLABLE)    H52.203 REFRACTION    H52.4       PLAN:   A final glasses prescription was given.  Allow time for adaptation.  The glasses may cause dizziness and affect depth perception for awhile.  Didn't do contact lens fitting today, can order off current prescription until 4/1/18  Return to clinic 1 year for Comprehensive Vision Exam      Kaelyn Stapleton O.D  [facility]  [address] [place] [address]. [place]  [place]  [address]    [phone]

## 2017-09-07 ENCOUNTER — OFFICE VISIT (OUTPATIENT)
Dept: PSYCHOLOGY | Facility: CLINIC | Age: 47
End: 2017-09-07
Payer: COMMERCIAL

## 2017-09-07 DIAGNOSIS — F41.1 GENERALIZED ANXIETY DISORDER: Primary | ICD-10-CM

## 2017-09-07 DIAGNOSIS — F90.0 ATTENTION DEFICIT HYPERACTIVITY DISORDER, INATTENTIVE TYPE: ICD-10-CM

## 2017-09-07 PROCEDURE — 90834 PSYTX W PT 45 MINUTES: CPT | Performed by: MARRIAGE & FAMILY THERAPIST

## 2017-09-07 NOTE — PROGRESS NOTES
Progress Note    Client Name: Franklin Mejia  Date: 9-7-17         Service Type: Individual      Session Start Time: 1pm  Session End Time: 150pm      Session Length: 50min     Session #: 10     Attendees: Client attended alone    Treatment Plan Last Reviewed: 6-6-17  PHQ-9 / NEAL-7 : Did not complete today      DATA      Progress Since Last Session (Related to Symptoms / Goals / Homework):   Symptoms: Improved- Client has been addressing struggles with anxiety and ADD.  He reports some stress at home but is navigating through it.  He has started to take L-tryptophan and has noticed some improvement.  He has been putting more effort into physical fitness.  Client is working on communication issues with his wife.  They will be starting couples therapy.      Homework: Achieved / completed to satisfaction      Episode of Care Goals: Satisfactory progress - ACTION (Actively working towards change); Intervened by reinforcing change plan / affirming steps taken     Current / Ongoing Stressors and Concerns:   -Client has been struggling with ADD and anxiety for years but it has been more of an issue the last year.  He feels he is coping with the symptoms and navigating through them.                -Client has been working on some long term career planning.              -Client has been addressing some minor relational issues with his spouse.  There are often struggles related to different parenting styles and their adult children.  They will be doing couples therapy.     -Client reports balancing schedule and developing a plan for fall.     -Client has been putting more effort into physical fitness.  He may be doing a crossfit class.     -Client reports he is letting some of the struggles go with his wife and avoiding circular arguments.          Treatment Objective(s) Addressed in This Session:   Relational/ family stress   Anxiety      Intervention:   Solution Focused: -  Client and wife will start couples therapy.  He is looking at some new part time job opportunities and self-care options for the fall.  He may start crossfit and has been looking at other ways to get his personal needs met.  He would like to start some kind of physical fitness plan in general.       ASSESSMENT: Current Emotional / Mental Status (status of significant symptoms):   Risk status (Self / Other harm or suicidal ideation)   Client denies current fears or concerns for personal safety.   Client denies current or recent suicidal ideation or behaviors.   Client denies current or recent homicidal ideation or behaviors.   Client denies current or recent self injurious behavior or ideation.   Client denies other safety concerns.   A safety and risk management plan has not been developed at this time, however client was given the after-hours number / 911 should there be a change in any of these risk factors.     Appearance:   Appropriate    Eye Contact:   Good    Psychomotor Behavior: Normal    Attitude:   Cooperative    Orientation:   All   Speech    Rate / Production: Normal     Volume:  Normal    Mood:    Anxious    Affect:    Appropriate    Thought Content:  Clear    Thought Form:  Coherent  Logical    Insight:    Good      Medication Review:   No current psychiatric medications prescribed     Medication Compliance:   NA     Changes in Health Issues:   None reported     Chemical Use Review:   Substance Use: Chemical use reviewed, no active concerns identified      Tobacco Use: No change in amount of tobacco use since last session.  Reviewed information and resources for quitting     Collateral Reports Completed:   Not Applicable    PLAN: (Client Tasks / Therapist Tasks / Other)  Client will return in three weeks.  He will continue to use L-tryptophan.  He will find a physical fitness routine that meets his needs.  He will look for ways he can adjust his schedule to foster more enjoyment with his free time.  He  will work on being assertive and setting boundaries.  He will start couples therapy with his wife.          Cherri Wooten,                                                          ________________________________________________________________________    Treatment Plan    Client's Name: Franklin Mejia  YOB: 1970    Date: 3-1-17    Diagnoses: Attention-Deficit/Hyperactivity Disorder 314.00 (F90.0) Predominantly inattentive presentation  300.02 (F41.1) Generalized Anxiety Disorder  Psychosocial & Contextual Factors: Some relational stress and work stress  WHODAS 2.0 (12 item)  This questionnaire asks about difficulties due to health conditions. Health conditions include disease or illnesses, other health problems that may be short or long lasting, injuries, mental health or emotional problems, and problems with alcohol or drugs.      Think back over the past 30 days and answer these questions, thinking about how much difficulty you had doing the following activities. For each question, please Ione only one response.      S1 Standing for long periods such as 30 minutes? None = 1   S2 Taking care of household responsibilities? None = 1   S3 Learning a new task, for example, learning how to get to a new place? None = 1   S4 How much of a problem do you have joining community activities (for example, festivals, Faith or other activities) in the same way as anyone else can? None = 1   S5 How much have you been emotionally affected by your health problems? None = 1               In the past 30 days, how much difficulty did you have in:   S6 Concentrating on doing something for ten minutes? Moderate = 3   S7 Walking a long distance such as a kilometer (or equivalent)? None = 1   S8 Washing your whole body? None = 1   S9 Getting dressed? None = 1   S10 Dealing with people you do not know? None = 1   S11 Maintaining a friendship? None = 1   S12 Your day to day work? None = 1       H1 Overall,  in the past 30 days, how many days were these difficulties present? Record number of days 0   H2 In the past 30 days, for how many days were you totally unable to carry out your usual activities or work because of any health condition? Record number of days 0   H3 In the past 30 days, not counting the days that you were totally unable, for how many days did you cut back or reduce your usual activities or work because of any health condition? Record number of days 0              Referral / Collaboration:  Referral to another professional/service is not indicated at this time..    Anticipated number of session or this episode of care: 4-8      MeasurableTreatment Goal(s) related to diagnosis / functional impairment(s)  Goal 1: Client will address struggles with escalating life stressors.      Objective #A (Client Action)    Client will develop a more concrete self-care plan.    Status: Continued - Date(s):6-6-17     Intervention(s)  Therapist will use CBT and solution focused therapy .    Objective #B  Client will work towards more of a balanced between home and work.  Status: Continued - Date(s):6-6-17     Intervention(s)  Therapist will use CBT and solution focused therapy .    Objective #C  Client will focus on more future focused career planning.  Status: Continued - Date(s):6-6-17     Intervention(s)  Therapist will use solution focused therapy .          Client has reviewed and agreed to the above plan.      Cherri Wooten, TH  March 1, 2017

## 2017-09-20 ENCOUNTER — OFFICE VISIT (OUTPATIENT)
Dept: FAMILY MEDICINE | Facility: CLINIC | Age: 47
End: 2017-09-20
Payer: COMMERCIAL

## 2017-09-20 VITALS
WEIGHT: 207 LBS | SYSTOLIC BLOOD PRESSURE: 113 MMHG | BODY MASS INDEX: 26.58 KG/M2 | HEART RATE: 57 BPM | TEMPERATURE: 97.4 F | DIASTOLIC BLOOD PRESSURE: 75 MMHG

## 2017-09-20 DIAGNOSIS — M54.9 UPPER BACK PAIN: Primary | ICD-10-CM

## 2017-09-20 DIAGNOSIS — S46.819A STRAIN OF TRAPEZIUS MUSCLE, UNSPECIFIED LATERALITY, INITIAL ENCOUNTER: ICD-10-CM

## 2017-09-20 PROCEDURE — 99213 OFFICE O/P EST LOW 20 MIN: CPT | Performed by: FAMILY MEDICINE

## 2017-09-20 ASSESSMENT — PAIN SCALES - GENERAL: PAINLEVEL: NO PAIN (0)

## 2017-09-20 NOTE — PROGRESS NOTES
SUBJECTIVE:   Franklin Mejia is a 47 year old male who presents to clinic today for the following health issues:       Upper back pain/swelling for the past 1-2 weeks    none    Problem list and histories reviewed & adjusted, as indicated.  Additional history: as documented         Reviewed and updated as needed this visit by clinical staff       Reviewed and updated as needed this visit by Provider          nagging soreness    Wife thought it was swollen up there     No  Trauma    Out in woods a lot    No joints otherwise hurting    Logistics at work, so heavy lifting        Gym membership on hold    Use it in winter months    Wt up some    No change in energy level    4-5 hours of sleep at night    A little prn ibuprofen    Full physical not done     Mentation and affect are fine    No tremor of speech or extremity    Patient has mild subj soreness over about 4 levels of upper thoracic spine in midline    Neck nontender    Low back fine    No rib soreness/ tenderness    Muscles not particularly sore    No pain on active neck range of motion, no radiculopathy    Good sensation and strength in both upper extremities    Some mild soreness on back range of motion, but range of motion still fairly good    No obvious swelling/ discoloration on upper back    Slightly subj sore over trapezius bilat    ASSESSMENT / PLAN:  (M54.9) Upper back pain  (primary encounter diagnosis)  Comment: overall exam is reassuring.  Don't want to do unnecessary tests.   Plan: advised active stretching / strengthening type approach ( careful of heavy weights ).  Call if symptoms not resolving.     (B29.015Y) Strain of trapezius muscle, unspecified laterality, initial encounter  Comment: minor   Plan: call if not resolving as expected       I reviewed the patient's medications, allergies, medical history, family history, and social history.    Eben Vásquez MD

## 2017-09-20 NOTE — NURSING NOTE
"Chief Complaint   Patient presents with     Back Pain     Health Maintenance       Initial /75 (BP Location: Left arm, Patient Position: Chair, Cuff Size: Adult Regular)  Pulse 57  Temp 97.4  F (36.3  C) (Oral)  Wt 207 lb (93.9 kg)  BMI 26.58 kg/m2 Estimated body mass index is 26.58 kg/(m^2) as calculated from the following:    Height as of 1/10/17: 6' 2\" (1.88 m).    Weight as of this encounter: 207 lb (93.9 kg).  Medication Reconciliation: complete   Emmanuelle Burnette CMA      "

## 2017-09-20 NOTE — PATIENT INSTRUCTIONS
Monitor symptoms closely    Stretching/ strengthening approach    Over the counter meds as needed     Call if symptoms not resolving

## 2017-09-20 NOTE — MR AVS SNAPSHOT
After Visit Summary   9/20/2017    Franklin Mejia    MRN: [id number]           Patient Information     Date Of Birth          1970        Visit Information        Provider Department      9/20/2017 3:20 PM Eben Vásquez MD [facility]        Care Instructions    Monitor symptoms closely    Stretching/ strengthening approach    Over the counter meds as needed     Call if symptoms not resolving           Follow-ups after your visit        Your next 10 appointments already scheduled     Sep 28, 2017  3:00 PM CDT   Return Visit with DOC Cooper   [address] [address]   [phone]            Oct 24, 2017  1:00 PM CDT   Return Visit with DOC Cooper   [address] [address]   [phone]              Who to contact     If you have questions or need follow up information about today's clinic visit or your schedule please contact [facility] directly at [phone].  Normal or non-critical lab and imaging results will be communicated to you by [employer], letter or phone within 4 business days after the clinic has received the results. If you do not hear from us within 7 days, please contact the clinic through [employer] or phone. If you have a critical or abnormal lab result, we will notify you by phone as soon as possible.  Submit refill requests through [employer] or call your pharmacy and they will forward the refill request to us. Please allow 3 business days for your refill to be completed.          Additional Information About Your Visit        MyChart Information     [employer] gives you secure access to your electronic health record. If you see a primary care provider, you can also send messages to your care team and make appointments. If you  have questions, please call your primary care clinic.  If you do not have a primary care provider, please call [phone] and they will assist you.        Care EveryWhere ID     This is your Care EveryWhere ID. This could be used by other organizations to access your [place] medical records  [id number]        Your Vitals Were     Pulse Temperature BMI (Body Mass Index)             57 97.4  F (36.3  C) (Oral) 26.58 kg/m2          Blood Pressure from Last 3 Encounters:   09/20/17 113/75   01/10/17 125/80   12/21/16 115/67    Weight from Last 3 Encounters:   09/20/17 207 lb (93.9 kg)   01/10/17 195 lb (88.5 kg)   12/21/16 201 lb (91.2 kg)              Today, you had the following     No orders found for display       Primary Care Provider Office Phone # Fax #    Branden Esquivel [phone]       [address] [place], [place]. \[facility] Name and Outlook.\"" [id number]        Equal Access to Services     GAEL SILVER : Hadii aad ku hadasho Soomaali, waaxda luqadaha, qaybta kaalmada adeegyada, waxay idiin haybenn jaden carrillo . So [facility] [phone].    ATENCIÓN: Si habla español, tiene a bajwa disposición servicios gratuitos de asistencia lingüística. Llame al [phone].    We comply with applicable federal civil rights laws and [place] laws. We do not discriminate on the basis of race, color, national origin, age, disability sex, sexual orientation or gender identity.            Thank you!     Thank you for choosing [facility]  for your care. Our goal is always to provide you with excellent care. Hearing back from our patients is one way we can continue to improve our services. Please take a few minutes to complete the written survey that you may receive in the mail after your visit with us. Thank you!             Your Updated Medication List - Protect others around you: Learn how to safely use, store and throw away your medicines at www.disposemymeds.org.          This list is accurate as  of: 9/20/17  3:52 PM.  Always use your most recent med list.                   Brand Name Dispense Instructions for use Diagnosis    CENTRUM PO      Take 1 tablet by mouth daily

## 2017-10-27 ENCOUNTER — HOSPITAL ENCOUNTER (EMERGENCY)
Facility: CLINIC | Age: 47
Discharge: HOME | End: 2017-10-27
Attending: PHYSICIAN ASSISTANT | Admitting: PHYSICIAN ASSISTANT
Payer: COMMERCIAL

## 2017-10-27 VITALS
OXYGEN SATURATION: 99 % | BODY MASS INDEX: 26.31 KG/M2 | DIASTOLIC BLOOD PRESSURE: 73 MMHG | HEIGHT: 74 IN | HEART RATE: 99 BPM | RESPIRATION RATE: 20 BRPM | TEMPERATURE: 97.6 F | WEIGHT: 205 LBS | SYSTOLIC BLOOD PRESSURE: 129 MMHG

## 2017-10-27 DIAGNOSIS — A08.4 VIRAL GASTROENTERITIS: ICD-10-CM

## 2017-10-27 PROCEDURE — 99212 OFFICE O/P EST SF 10 MIN: CPT

## 2017-10-27 PROCEDURE — 99213 OFFICE O/P EST LOW 20 MIN: CPT | Performed by: PHYSICIAN ASSISTANT

## 2017-10-27 NOTE — LETTER
[facility] EMERGENCY DEPARTMENT  [address] [address]  [phone]          October 27, 2017    RE:  Franklin Mejia                                                                                                                                                       [address]  [place] [address]            To whom it may concern:    Franklin Mejia is under my professional care for Viral gastroenteritis He  may return to work with no restrictions his next available shift.        Sincerely,      Lj Torres PA-C

## 2017-10-27 NOTE — ED AVS SNAPSHOT
[facility] Emergency Department    [address] [address]    Phone:  [phone]    Fax:  [phone]                                       Franklin Mejia   MRN: [id number]    Department:  [facility] Emergency Department   Date of Visit:  10/27/2017           Patient Information     Date Of Birth          1970        Your diagnoses for this visit were:     Viral gastroenteritis        You were seen by Lj Torres PA-C.        Discharge Instructions         Viral Gastroenteritis (Adult)    Gastroenteritis is commonly called the stomach flu. It is most often caused by a virus that affects the stomach and intestinal tract and usually lasts from 2 to 7 days. Common viruses causing gastroenteritis include norovirus, rotavirus, and hepatitis A. Non-viral causes of gastroenteritis include bacteria, parasites, and toxins.  The danger from repeated vomiting or diarrhea is dehydration. This is the loss of too much fluid from the body. When this occurs, body fluids must be replaced. Antibiotics do not help with this illness because it is usually viral.Simple home treatment will be helpful.  Symptoms of viral gastroenteritis may include:    Watery, loose stools    Stomach pain or abdominal cramps    Fever and chills    Nausea and vomiting    Loss of bowel control    Headache  Home care  Gastroenteritis is transmitted by contact with the stool or vomit of an infected person. This can occur from person to person or from contact with a contaminated surface.  Follow these guidelines when caring for yourself at home:    If symptoms are severe, rest at home for the next 24 hours or until you are feeling better.    Wash your hands with soap and water or use alcohol-based  to prevent the spread of infection. Wash your hands after touching anyone who is sick.    Wash your hands or use alcohol-based  after using the toilet and before meals. Clean the toilet after each  use.  Remember these tips when preparing food:    People with diarrhea should not prepare or serve food to others. When preparing foods, wash your hands before and after.    Wash your hands after using cutting boards, countertops, knives, or utensils that have been in contact with raw food.    Keep uncooked meats away from cooked and ready-to-eat foods.  Medicine  You may use acetaminophen or NSAID medicines like ibuprofen or naproxen to control fever unless another medicine was given. If you have chronic liver or kidney disease, talk with your healthcare provider before using these medicines. Also talk with your provider if you've had a stomach ulcer or gastrointestinal bleeding. Don't give aspirin to anyone under 18 years of age who is ill with a fever. It may cause severe liver damage. Don't use NSAIDS is you are already taking one for another condition (like arthritis) or are on aspirin (such as for heart disease or after a stroke).  If medicine for vomiting or diarrhea are prescribed, take these only as directed. Do not take over-the-counter medicines for vomiting or diarrhea unless instructed by your healthcare provider.  Diet  Follow these guidelines for food:    Water and liquids are important so you don't get dehydrated. Drink a small amount at a time or suck on ice chips if you are vomiting.    If you eat, avoid fatty, greasy, spicy, or fried foods.    Don't eat dairy if you have diarrhea. This can make diarrhea worse.    Avoid tobacco, alcohol, and caffeine which may worsen symptoms.  During the first 24 hours (the first full day), follow the diet below:    Beverages. Sports drinks, soft drinks without caffeine, ginger ale, mineral water (plain or flavored), decaffeinated tea and coffee. If you are very dehydrated, sports drinks aren't a good choice. They have too much sugar and not enough electrolytes. In this case, commercially available products called oral rehydration solutions, are best.    Soups.  Eat clear broth, consommé, and bouillon.    Desserts. Eat gelatin, popsicles, and fruit juice bars.  During the next 24 hours (the second day), you may add the following to the above:    Hot cereal, plain toast, bread, rolls, and crackers    Plain noodles, rice, mashed potatoes, chicken noodle or rice soup    Unsweetened canned fruit (avoid pineapple), bananas    Limit fat intake to less than 15 grams per day. Do this by avoiding margarine, butter, oils, mayonnaise, sauces, gravies, fried foods, peanut butter, meat, poultry, and fish.    Limit fiber and avoid raw or cooked vegetables, fresh fruits (except bananas), and bran cereals.    Limit caffeine and chocolate. Don't use spices or seasonings other than salt.    Limit dairy products.    Avoid alcohol.  During the next 24 hours:    Gradually resume a normal diet as you feel better and your symptoms improve.    If at any time it starts getting worse again, go back to clear liquids until you feel better.  Follow-up care  Follow up with your healthcare provider, or as advised. Call your provider if you don't get better within 24 hours or if diarrhea lasts more than a week. Also follow up if you are unable to keep down liquids and get dehydrated. If a stool (diarrhea) sample was taken, call as directed for the results.  Call 911  Call 911 if any of these occur:    Trouble breathing    Chest pain    Confused    Severe drowsiness or trouble awakening    Fainting or loss of consciousness    Rapid heart rate    Seizure    Stiff neck  When to seek medical advice  Call your healthcare provider right away if any of these occur:    Abdominal pain that gets worse    Continued vomiting (unable to keep liquids down)    Frequent diarrhea (more than 5 times a day)    Blood in vomit or stool (black or red color)    Dark urine, reduced urine output, or extreme thirst    Weakness or dizziness    Drowsiness    Fever of 100.4 F (38 C) oral or higher that does not get better with fever  medicine    New rash  Date Last Reviewed: 1/3/2016    1713-8492 [employer]. [address] [place] [address], [place] [address]. All rights reserved. This information is not intended as a substitute for professional medical care. Always follow your healthcare professional's instructions.          24 Hour Appointment Hotline       To make an appointment at any [facility], call [phone] ([phone]). If you don't have a family doctor or clinic, we will help you find one. [facility] are conveniently located to serve the needs of you and your family.             Review of your medicines      Our records show that you are taking the medicines listed below. If these are incorrect, please call your family doctor or clinic.        Dose / Directions Last dose taken    CENTRUM PO   Dose:  1 tablet        Take 1 tablet by mouth daily   Refills:  0                Orders Needing Specimen Collection     None      Pending Results     No orders found from 10/25/2017 to 10/28/2017.            Pending Culture Results     No orders found from 10/25/2017 to 10/28/2017.            Pending Results Instructions     If you had any lab results that were not finalized at the time of your Discharge, you can call the ED Lab Result RN at [phone]. You will be contacted by this team for any positive Lab results or changes in treatment. The nurses are available 7 days a week from 10A to 6:30P.  You can leave a message 24 hours per day and they will return your call.        Test Results From Your Hospital Stay               Thank you for choosing [facility]       Thank you for choosing [facility] for your care. Our goal is always to provide you with excellent care. Hearing back from our patients is one way we can continue to improve our services. Please take a few minutes to complete the written survey that you may receive in the mail after you visit with us. Thank you!        [employer] Information     [employer] gives you  secure access to your electronic health record. If you see a primary care provider, you can also send messages to your care team and make appointments. If you have questions, please call your primary care clinic.  If you do not have a primary care provider, please call [phone] and they will assist you.        Care EveryWhere ID     This is your Care EveryWhere ID. This could be used by other organizations to access your [facility] medical records  [id number]        Equal Access to Services     GAEL SILVER : Mino jimenezo Soanuel, waakinda lulashay, qamarbinta kaalmatejal dubois, june anaya. So [facility] [phone].    ATENCIÓN: Si habla español, tiene a bajwa disposición servicios gratuitos de asistencia lingüística. Llame al [phone].    We comply with applicable federal civil rights laws and [place] laws. We do not discriminate on the basis of race, color, national origin, age, disability, sex, sexual orientation, or gender identity.            After Visit Summary       This is your record. Keep this with you and show to your community pharmacist(s) and doctor(s) at your next visit.

## 2017-10-27 NOTE — ED AVS SNAPSHOT
[facility] Emergency Department    [address] [facility] [address]    Phone:  [phone]    Fax:  [phone]                                       Franklin Mejia   MRN: [id number]    Department:  [facility] Emergency Department   Date of Visit:  10/27/2017           After Visit Summary Signature Page     I have received my discharge instructions, and my questions have been answered. I have discussed any challenges I see with this plan with the nurse or doctor.    ..........................................................................................................................................  Patient/Patient Representative Signature      ..........................................................................................................................................  Patient Representative Print Name and Relationship to Patient    ..................................................               ................................................  Date                                            Time    ..........................................................................................................................................  Reviewed by Signature/Title    ...................................................              ..............................................  Date                                                            Time

## 2017-10-27 NOTE — ED PROVIDER NOTES
"HPI: Franklin Mejia is an 47 year old male who presents for evaluation and treatment of diarrhea and vomiting.  Patient works at the [employer] and several coworkers are sick with the same.  He had vomiting 2 nights ago with diarrhea.  The vomiting resolved yesterday, and he is keeping food and fluids down just fine.  The diarrhea is better today.  He had some body aches and these have resolved.  He denies any dark tarry or bloody stool.  No fever.  No dizziness.  No foreign travel.      ROS:  10 point review of systems was completed and is negative unless noted in the HPI above.        Surgical History:  Past Surgical History:   Procedure Laterality Date     APPENDECTOMY  1983        Problem List:  Patient Active Problem List   Diagnosis     GERD (gastroesophageal reflux disease)     Overweight (BMI 25.0-29.9)     NEAL (generalized anxiety disorder)        Allergies:  No Known Allergies     Current Meds:  No current facility-administered medications for this encounter.     Current Outpatient Prescriptions:      Multiple Vitamins-Minerals (CENTRUM PO), Take 1 tablet by mouth daily, Disp: , Rfl:      PHYSICAL EXAM:     Vital signs noted and reviewed by Lj Torres  /73  Pulse 99  Temp 97.6  F (36.4  C) (Oral)  Resp 20  Ht 1.88 m (6' 2\")  Wt 93 kg (205 lb)  SpO2 99%  BMI 26.32 kg/m2     PEFR:  General appearance: healthy, alert and no distress  Ears: R TM - normal: no effusions, no erythema, and normal landmarks, L TM - normal: no effusions, no erythema, and normal landmarks  Eyes: R normal, L normal  Nose: normal  Oropharynx: normal  Neck: supple and no adenopathy  Lungs: normal and clear to auscultation  Heart: S1, S2 normal, no murmur, click, rub or gallop, regular rate and rhythm, chest is clear without rales or wheezing, no pedal edema, no JVD, no hepatosplenomegaly  Abdomen: Abdomen soft, non-tender without masses or organomegaly       ASSESSMENT:     1. Viral gastroenteritis           PLAN:   "   Patient given letter for work.  Push fluids.  Advance diet as tolerated.  He will follow up with is PCP if symptoms return.  Patient verbalized understanding and agreed with this plan.     Lj Torres  10/27/2017, 1:40 PM       Lj Torres PA-C  10/27/17 1344

## 2017-10-27 NOTE — DISCHARGE INSTRUCTIONS
Viral Gastroenteritis (Adult)    Gastroenteritis is commonly called the stomach flu. It is most often caused by a virus that affects the stomach and intestinal tract and usually lasts from 2 to 7 days. Common viruses causing gastroenteritis include norovirus, rotavirus, and hepatitis A. Non-viral causes of gastroenteritis include bacteria, parasites, and toxins.  The danger from repeated vomiting or diarrhea is dehydration. This is the loss of too much fluid from the body. When this occurs, body fluids must be replaced. Antibiotics do not help with this illness because it is usually viral.Simple home treatment will be helpful.  Symptoms of viral gastroenteritis may include:    Watery, loose stools    Stomach pain or abdominal cramps    Fever and chills    Nausea and vomiting    Loss of bowel control    Headache  Home care  Gastroenteritis is transmitted by contact with the stool or vomit of an infected person. This can occur from person to person or from contact with a contaminated surface.  Follow these guidelines when caring for yourself at home:    If symptoms are severe, rest at home for the next 24 hours or until you are feeling better.    Wash your hands with soap and water or use alcohol-based  to prevent the spread of infection. Wash your hands after touching anyone who is sick.    Wash your hands or use alcohol-based  after using the toilet and before meals. Clean the toilet after each use.  Remember these tips when preparing food:    People with diarrhea should not prepare or serve food to others. When preparing foods, wash your hands before and after.    Wash your hands after using cutting boards, countertops, knives, or utensils that have been in contact with raw food.    Keep uncooked meats away from cooked and ready-to-eat foods.  Medicine  You may use acetaminophen or NSAID medicines like ibuprofen or naproxen to control fever unless another medicine was given. If you have chronic  liver or kidney disease, talk with your healthcare provider before using these medicines. Also talk with your provider if you've had a stomach ulcer or gastrointestinal bleeding. Don't give aspirin to anyone under 18 years of age who is ill with a fever. It may cause severe liver damage. Don't use NSAIDS is you are already taking one for another condition (like arthritis) or are on aspirin (such as for heart disease or after a stroke).  If medicine for vomiting or diarrhea are prescribed, take these only as directed. Do not take over-the-counter medicines for vomiting or diarrhea unless instructed by your healthcare provider.  Diet  Follow these guidelines for food:    Water and liquids are important so you don't get dehydrated. Drink a small amount at a time or suck on ice chips if you are vomiting.    If you eat, avoid fatty, greasy, spicy, or fried foods.    Don't eat dairy if you have diarrhea. This can make diarrhea worse.    Avoid tobacco, alcohol, and caffeine which may worsen symptoms.  During the first 24 hours (the first full day), follow the diet below:    Beverages. Sports drinks, soft drinks without caffeine, ginger ale, mineral water (plain or flavored), decaffeinated tea and coffee. If you are very dehydrated, sports drinks aren't a good choice. They have too much sugar and not enough electrolytes. In this case, commercially available products called oral rehydration solutions, are best.    Soups. Eat clear broth, consommé, and bouillon.    Desserts. Eat gelatin, popsicles, and fruit juice bars.  During the next 24 hours (the second day), you may add the following to the above:    Hot cereal, plain toast, bread, rolls, and crackers    Plain noodles, rice, mashed potatoes, chicken noodle or rice soup    Unsweetened canned fruit (avoid pineapple), bananas    Limit fat intake to less than 15 grams per day. Do this by avoiding margarine, butter, oils, mayonnaise, sauces, gravies, fried foods, peanut  butter, meat, poultry, and fish.    Limit fiber and avoid raw or cooked vegetables, fresh fruits (except bananas), and bran cereals.    Limit caffeine and chocolate. Don't use spices or seasonings other than salt.    Limit dairy products.    Avoid alcohol.  During the next 24 hours:    Gradually resume a normal diet as you feel better and your symptoms improve.    If at any time it starts getting worse again, go back to clear liquids until you feel better.  Follow-up care  Follow up with your healthcare provider, or as advised. Call your provider if you don't get better within 24 hours or if diarrhea lasts more than a week. Also follow up if you are unable to keep down liquids and get dehydrated. If a stool (diarrhea) sample was taken, call as directed for the results.  Call 911  Call 911 if any of these occur:    Trouble breathing    Chest pain    Confused    Severe drowsiness or trouble awakening    Fainting or loss of consciousness    Rapid heart rate    Seizure    Stiff neck  When to seek medical advice  Call your healthcare provider right away if any of these occur:    Abdominal pain that gets worse    Continued vomiting (unable to keep liquids down)    Frequent diarrhea (more than 5 times a day)    Blood in vomit or stool (black or red color)    Dark urine, reduced urine output, or extreme thirst    Weakness or dizziness    Drowsiness    Fever of 100.4 F (38 C) oral or higher that does not get better with fever medicine    New rash  Date Last Reviewed: 1/3/2016    9696-8018 [employer]. [address] [place] [address], [place] [address]. All rights reserved. This information is not intended as a substitute for professional medical care. Always follow your healthcare professional's instructions.

## 2017-11-13 ENCOUNTER — HOSPITAL ENCOUNTER (EMERGENCY)
Facility: CLINIC | Age: 47
Discharge: HOME | End: 2017-11-13
Attending: NURSE PRACTITIONER | Admitting: NURSE PRACTITIONER
Payer: COMMERCIAL

## 2017-11-13 VITALS
SYSTOLIC BLOOD PRESSURE: 135 MMHG | HEART RATE: 97 BPM | OXYGEN SATURATION: 99 % | WEIGHT: 200 LBS | BODY MASS INDEX: 25.68 KG/M2 | TEMPERATURE: 98.3 F | DIASTOLIC BLOOD PRESSURE: 79 MMHG | RESPIRATION RATE: 16 BRPM

## 2017-11-13 DIAGNOSIS — J11.1 INFLUENZA-LIKE ILLNESS: ICD-10-CM

## 2017-11-13 PROCEDURE — 99212 OFFICE O/P EST SF 10 MIN: CPT

## 2017-11-13 PROCEDURE — 99213 OFFICE O/P EST LOW 20 MIN: CPT | Performed by: NURSE PRACTITIONER

## 2017-11-13 NOTE — ED AVS SNAPSHOT
[facility] Emergency Department    [address] [facility] [address]    Phone:  [phone]    Fax:  [phone]                                       Franklin Mejia   MRN: [id number]    Department:  [facility] Emergency Department   Date of Visit:  11/13/2017           After Visit Summary Signature Page     I have received my discharge instructions, and my questions have been answered. I have discussed any challenges I see with this plan with the nurse or doctor.    ..........................................................................................................................................  Patient/Patient Representative Signature      ..........................................................................................................................................  Patient Representative Print Name and Relationship to Patient    ..................................................               ................................................  Date                                            Time    ..........................................................................................................................................  Reviewed by Signature/Title    ...................................................              ..............................................  Date                                                            Time

## 2017-11-13 NOTE — ED AVS SNAPSHOT
[facility] Emergency Department    [address] [facility] [address]    Phone:  [phone]    Fax:  [phone]                                       Franklin Mejia   MRN: [id number]    Department:  [facility] Emergency Department   Date of Visit:  11/13/2017           Patient Information     Date Of Birth          1970        Your diagnoses for this visit were:     Influenza-like illness        You were seen by Sabi Hoyos APRN CNP.      Follow-up Information     Follow up with Branden Esquivel MD.    Specialty:  Family Practice    Why:  As needed    Contact information:    [address] [facility] [address]  [phone]          Follow up with [facility] Emergency Department.    Specialty:  EMERGENCY MEDICINE    Why:  If symptoms worsen    Contact information:    [address] [place] [id number] [phone]    Additional information:    The medical center is located at   [address] [facility] (between [facility] and   [facility] [address] in [place], [place] miles north   of [place]).        Discharge Instructions       Discharge Instructions  Influenza    You were diagnosed today with influenza or influenza like illness.  Influenza is a respiratory illness caused by influenza A or B viruses.  Influenza causes fever, headache, muscle aches, and fatigue.  These symptoms start one to four days after you have been around a person with this illness.  People with influenza commonly have a dry cough, sore throat, and a runny nose.   Influenza is spread through sneezing and coughing and can live on surfaces for several days.  It is usually contagious for 5 days but in some cases up to 10 days and often affects several family members. If you have a family member who is less than 2 years old, older than 65 years old, pregnant or has a serious medical condition, they should be seen right away by a physician to decide if they should take preventative  medications.      Return to the Emergency Department if:    You have trouble breathing.    You develop pain in your chest.    You have signs of being dehydrated, such as dizziness or unable to urinate at least three times daily.    You feel confused.    You cannot stop vomiting or you cannot drink enough fluids.    In children, you should seek help if the child has any of the above or if child:    Has blue or purplish skin color.    Is so irritable that he or she does not want to be held.    Does not have tears when crying (in infants) or does not urinate at least three times daily.    Does not wake up easily.    Follow-up with your doctor if you are not improving after 5 days.    What can I do to help myself?    Rest.    Fluids -- Drink hydrating solutions such as Gatorade  or Pedialyte  as often as you can. If you are drinking enough, you should pass urine at least every eight hours.    Tylenol  (acetaminophen) and Advil  (ibuprofen) can relieve fever, headache, and muscle aches. Do not give aspirin to children under 18 years old.     Antiviral treatment -- Antiviral medicines do not make the flu symptoms go away immediately.  They have only been shown to make the symptoms go away 12 to 24 hours sooner than they would without treatment.       Antibiotics -- Antibiotics are NOT useful for treating viral illnesses such as influenza. Antibiotics should only be used if there is a bacterial complication of the flu such as bacterial pneumonia, ear infection, or sinusitis.    Because you were diagnosed with a flu like illness you are very contagious.  This means you cannot work, attend school or  for at least 24 hours or until you no longer have a fever.  If you were given a prescription for medicine here today, be sure to read all of the information (including the package insert) that comes with your prescription.  This will include important information about the medicine, its side effects, and any warnings that  you need to know about.  The pharmacist who fills the prescription can provide more information and answer questions you may have about the medicine.  If you have questions or concerns that the pharmacist cannot address, please call or return to the Emergency Department.     Remember that you can always come back to the Emergency Department if you are not able to see your regular doctor in the amount of time listed above, if you get any new symptoms, or if there is anything that worries you.        24 Hour Appointment Hotline       To make an appointment at any [facility], call [phone] ([phone]). If you don't have a family doctor or clinic, we will help you find one. [facility] clinics are conveniently located to serve the needs of you and your family.             Review of your medicines      Our records show that you are taking the medicines listed below. If these are incorrect, please call your family doctor or clinic.        Dose / Directions Last dose taken    CENTRUM PO   Dose:  1 tablet        Take 1 tablet by mouth daily   Refills:  0                Orders Needing Specimen Collection     None      Pending Results     No orders found from 11/11/2017 to 11/14/2017.            Pending Culture Results     No orders found from 11/11/2017 to 11/14/2017.            Pending Results Instructions     If you had any lab results that were not finalized at the time of your Discharge, you can call the ED Lab Result RN at [phone]. You will be contacted by this team for any positive Lab results or changes in treatment. The nurses are available 7 days a week from 10A to 6:30P.  You can leave a message 24 hours per day and they will return your call.        Test Results From Your Hospital Stay               Thank you for choosing [facility]       Thank you for choosing [facility] for your care. Our goal is always to provide you with excellent care. Hearing back from our patients is one way we can continue to  improve our services. Please take a few minutes to complete the written survey that you may receive in the mail after you visit with us. Thank you!        [employer] Information     [employer] gives you secure access to your electronic health record. If you see a primary care provider, you can also send messages to your care team and make appointments. If you have questions, please call your primary care clinic.  If you do not have a primary care provider, please call [phone] and they will assist you.        Care EveryWhere ID     This is your Care EveryWhere ID. This could be used by other organizations to access your [facility] medical records  [id number]        Equal Access to Services     GAEL SILVER : Mino Marsh, margaret hager, june huerta. So [facility] [phone].    ATENCIÓN: Si habla español, tiene a bajwa disposición servicios gratuitos de asistencia lingüística. Llame al [phone].    We comply with applicable federal civil rights laws and [place] laws. We do not discriminate on the basis of race, color, national origin, age, disability, sex, sexual orientation, or gender identity.            After Visit Summary       This is your record. Keep this with you and show to your community pharmacist(s) and doctor(s) at your next visit.

## 2017-11-14 NOTE — ED PROVIDER NOTES
History     Chief Complaint   Patient presents with     URI     cough cold ear pain, sinus drainage since Friday.      HPI  Franklin Mejia is a 47 year old male who since urgent care for evaluation of cough, nasal congestion, sore throat, and bilateral ear pain.  Symptoms started 3 days ago.  Fever.  Tolerating fluids.  Denies nausea or vomiting.  Exposed to wife who had similar symptoms in the last 2 weeks.    Problem List:    Patient Active Problem List    Diagnosis Date Noted     NEAL (generalized anxiety disorder) 06/16/2016     Priority: Medium     Overweight (BMI 25.0-29.9) 12/17/2015     Priority: Medium     GERD (gastroesophageal reflux disease) 11/13/2013     Priority: Medium        Past Medical History:    No past medical history on file.    Past Surgical History:    Past Surgical History:   Procedure Laterality Date     APPENDECTOMY  1983       Family History:    Family History   Problem Relation Age of Onset     Lipids Mother      Hypertension Father      Macular Degeneration Father      DIABETES Father      DIABETES Brother      CEREBROVASCULAR DISEASE No family hx of      Thyroid Disease No family hx of      Glaucoma No family hx of      CANCER No family hx of        Social History:  Marital Status:   [2]  Social History   Substance Use Topics     Smoking status: Former Smoker     Quit date: 8/26/2006     Smokeless tobacco: Current User     Types: Chew      Comment: lives in smoke free household.     Alcohol use 0.0 oz/week     0 Standard drinks or equivalent per week      Comment: occasional         Medications:      Multiple Vitamins-Minerals (CENTRUM PO)         Review of Systems  As mentioned above in the history present illness. All other systems were reviewed and are negative.    Physical Exam   BP: 135/79  Pulse: 97  Temp: 98.3  F (36.8  C)  Resp: 16  Weight: 90.7 kg (200 lb)  SpO2: 99 %      Physical Exam    GENERAL APPEARANCE: healthy, alert and no distress  EYES: EOMI,  PERRL,  conjunctiva clear  HENT: ear canals and TM's normal.  Nose and mouth without ulcers, erythema or lesions  NECK: supple, nontender, no lymphadenopathy  RESP: lungs clear to auscultation - no rales, rhonchi or wheezes  CV: regular rates and rhythm, normal S1 S2, no murmur noted    ED Course     ED Course     Procedures               Labs Ordered and Resulted from Time of ED Arrival Up to the Time of Departure from the ED - No data to display    Assessments & Plan (with Medical Decision Making)   DDx:  bronchitis, pneumonia, Viral URI (ie.. Influenza), sinusitis    Normal exam. Likely viral illness, influenza like illness suspected.   -symptomatic treatment.   -worrisome reasons to return discussed.  I have reviewed the nursing notes.    I have reviewed the findings, diagnosis, plan and need for follow up with the patient.    Discharge Medication List as of 11/13/2017  7:19 PM          Final diagnoses:   Influenza-like illness       11/13/2017   [facility] EMERGENCY DEPARTMENT     Sabi Hoyos APRN CNP  11/13/17 2023

## 2018-03-05 ENCOUNTER — HOSPITAL ENCOUNTER (EMERGENCY)
Facility: CLINIC | Age: 48
Discharge: HOME | End: 2018-03-05
Attending: PHYSICIAN ASSISTANT | Admitting: PHYSICIAN ASSISTANT
Payer: COMMERCIAL

## 2018-03-05 VITALS
HEIGHT: 75 IN | SYSTOLIC BLOOD PRESSURE: 124 MMHG | TEMPERATURE: 100 F | DIASTOLIC BLOOD PRESSURE: 71 MMHG | RESPIRATION RATE: 18 BRPM | OXYGEN SATURATION: 98 % | BODY MASS INDEX: 24.25 KG/M2 | WEIGHT: 195 LBS

## 2018-03-05 DIAGNOSIS — J11.1 INFLUENZA-LIKE ILLNESS: ICD-10-CM

## 2018-03-05 LAB
INTERNAL QC OK POCT: YES
S PYO AG THROAT QL IA.RAPID: NEGATIVE

## 2018-03-05 PROCEDURE — 87880 STREP A ASSAY W/OPTIC: CPT | Performed by: PHYSICIAN ASSISTANT

## 2018-03-05 PROCEDURE — 99213 OFFICE O/P EST LOW 20 MIN: CPT | Performed by: PHYSICIAN ASSISTANT

## 2018-03-05 PROCEDURE — G0463 HOSPITAL OUTPT CLINIC VISIT: HCPCS

## 2018-03-05 PROCEDURE — 87081 CULTURE SCREEN ONLY: CPT | Performed by: PHYSICIAN ASSISTANT

## 2018-03-05 NOTE — ED AVS SNAPSHOT
[facility] Emergency Department    [address] [facility] [address]    Phone:  [phone]    Fax:  [phone]                                       Franklin eMjia   MRN: [id number]    Department:  [facility] Emergency Department   Date of Visit:  3/5/2018           After Visit Summary Signature Page     I have received my discharge instructions, and my questions have been answered. I have discussed any challenges I see with this plan with the nurse or doctor.    ..........................................................................................................................................  Patient/Patient Representative Signature      ..........................................................................................................................................  Patient Representative Print Name and Relationship to Patient    ..................................................               ................................................  Date                                            Time    ..........................................................................................................................................  Reviewed by Signature/Title    ...................................................              ..............................................  Date                                                            Time

## 2018-03-05 NOTE — ED AVS SNAPSHOT
[facility] Emergency Department    [address] [address]    Phone:  [phone]    Fax:  [phone]                                       Franklin Mejia   MRN: [id number]    Department:  [facility] Emergency Department   Date of Visit:  3/5/2018           Patient Information     Date Of Birth          1970        Your diagnoses for this visit were:     Influenza-like illness        You were seen by Lj Torres PA-C.      Discharge References/Attachments     (ADULT), INFLUENZA (ENGLISH)      24 Hour Appointment Hotline       To make an appointment at any [facility] clinic, call [phone] ([phone]). If you don't have a family doctor or clinic, we will help you find one. [facility] clinics are conveniently located to serve the needs of you and your family.             Review of your medicines      Our records show that you are taking the medicines listed below. If these are incorrect, please call your family doctor or clinic.        Dose / Directions Last dose taken    CENTRUM PO   Dose:  1 tablet        Take 1 tablet by mouth daily   Refills:  0                Procedures and tests performed during your visit     Rapid strep group A screen POCT      Orders Needing Specimen Collection     None      Pending Results     No orders found from 3/3/2018 to 3/6/2018.            Pending Culture Results     No orders found from 3/3/2018 to 3/6/2018.            Pending Results Instructions     If you had any lab results that were not finalized at the time of your Discharge, you can call the ED Lab Result RN at [phone]. You will be contacted by this team for any positive Lab results or changes in treatment. The nurses are available 7 days a week from 10A to 6:30P.  You can leave a message 24 hours per day and they will return your call.        Test Results From Your Hospital Stay        3/5/2018  2:37 PM      Component Results     Component Value Ref Range & Units  Status    Rapid Strep A Screen NEGATIVE neg Final    Internal QC OK Yes  Final                Thank you for choosing [facility]       Thank you for choosing [facility] for your care. Our goal is always to provide you with excellent care. Hearing back from our patients is one way we can continue to improve our services. Please take a few minutes to complete the written survey that you may receive in the mail after you visit with us. Thank you!        [employer] Information     [employer] gives you secure access to your electronic health record. If you see a primary care provider, you can also send messages to your care team and make appointments. If you have questions, please call your primary care clinic.  If you do not have a primary care provider, please call [phone] and they will assist you.        Care EveryWhere ID     This is your Care EveryWhere ID. This could be used by other organizations to access your [facility] medical records  [id number]        Equal Access to Services     GAEL SILVER : Mino Marsh, margaret hager, june huerta. So [facility] [phone].    ATENCIÓN: Si habla español, tiene a bajwa disposición servicios gratuitos de asistencia lingüística. Cyame al [phone].    We comply with applicable federal civil rights laws and [place] laws. We do not discriminate on the basis of race, color, national origin, age, disability, sex, sexual orientation, or gender identity.            After Visit Summary       This is your record. Keep this with you and show to your community pharmacist(s) and doctor(s) at your next visit.

## 2018-03-05 NOTE — ED PROVIDER NOTES
"Chief Complaint:     Chief Complaint   Patient presents with     Nasal Congestion     chest congestion,st,earache       HPI: Franklin Mejia is an 47 year old male who presents with a cough.   It began  4 day(s) ago and has unchanged.  Cough is dry There is no shortness of breath, wheezing and chest pain.  He has been exposed to illness at work.  He did not get a flu shot this year.    Associated symptoms: sore throat, congestion, achiness and low grade fevers.    Recent travel?  no.    No abdominal pain, or diarrhea,  No vomiting.  No headache or neck pain.    ROS: Further problem focused system review was otherwise negative.     Respiratory History  occasional episodes of bronchitis     Problem history  Patient Active Problem List   Diagnosis     GERD (gastroesophageal reflux disease)     Overweight (BMI 25.0-29.9)     NEAL (generalized anxiety disorder)        Allergies  No Known Allergies     Smoking History  History   Smoking Status     Former Smoker     Quit date: 8/26/2006   Smokeless Tobacco     Current User     Types: Chew     Comment: lives in smoke free household.        Current Meds  No current facility-administered medications for this encounter.     Current Outpatient Prescriptions:      Multiple Vitamins-Minerals (CENTRUM PO), Take 1 tablet by mouth daily, Disp: , Rfl:         OBJECTIVE     Vital signs reviewed by Lj Torres  /71  Temp 100  F (37.8  C)  Resp 18  Ht 1.905 m (6' 3\")  Wt 88.5 kg (195 lb)  SpO2 98%  BMI 24.37 kg/m2     PEFR:  General appearance: healthy, alert and no distress  Ears: R TM - normal: no effusions, no erythema, and normal landmarks, L TM - normal: no effusions, no erythema, and normal landmarks  Eyes: R EOM's intact and PERRLA, L EOM's intact and PERRLA  Nose: boggy and clear discharge  Oropharynx: marked erythema  Neck: supple and no adenopathy  Lungs: normal and clear to auscultation  Heart: S1, S2 normal, no murmur, click, rub or gallop, regular rate " and rhythm          Labs:     Results for orders placed or performed during the hospital encounter of 03/05/18   Rapid strep group A screen POCT   Result Value Ref Range    Rapid Strep A Screen NEGATIVE neg    Internal QC OK Yes           ASSESSMENT    1. Influenza-like illness        PLAN  RST was negative and communicated to patient.  We will call with culture results if positive.   Rest, Push fluids, vaporizer, elevation of head of bed.  Ibuprofen and or Tylenol for any fever or body aches.  Over the counter cough suppressant- PRN- as discussed.   If symptoms worsen, recheck immediately otherwise follow up with your PCP PRN.  Patient given letter for work.  Patient verbalized understanding and agreed with this plan.     Lj Torres  3/5/2018, 2:16 PM       Lj Torres PA-C  03/05/18 1447

## 2018-03-05 NOTE — LETTER
[facility] EMERGENCY DEPARTMENT  [address] [address]  [phone]          March 5, 2018    RE:  Franklin Mejia                                                                                                                                                       [address]  [place] [address]            To whom it may concern:    Franklin Mejia is under my professional care for Influenza-like illness He should not return to work until fever free for 24 hours.    Sincerely,      Lj Torres PA-C

## 2018-03-08 LAB
BACTERIA SPEC CULT: NORMAL
SPECIMEN SOURCE: NORMAL

## 2018-03-30 ENCOUNTER — OFFICE VISIT (OUTPATIENT)
Dept: OPHTHALMOLOGY | Facility: CLINIC | Age: 48
End: 2018-03-30
Payer: COMMERCIAL

## 2018-03-30 DIAGNOSIS — H00.11 CHALAZION OF RIGHT UPPER EYELID: Primary | ICD-10-CM

## 2018-03-30 PROCEDURE — 92002 INTRM OPH EXAM NEW PATIENT: CPT | Performed by: OPHTHALMOLOGY

## 2018-03-30 RX ORDER — CEPHALEXIN 500 MG/1
500 CAPSULE ORAL 3 TIMES DAILY
Qty: 30 CAPSULE | Refills: 1 | Status: SHIPPED | OUTPATIENT
Start: 2018-03-30 | End: 2018-07-11

## 2018-03-30 ASSESSMENT — VISUAL ACUITY
OD_CC: 20/25
METHOD: SNELLEN - LINEAR
OD_PH_CC: 20-1
OS_CC+: -2
OD_CC+: -2
CORRECTION_TYPE: GLASSES
OS_CC: 20/20

## 2018-03-30 NOTE — MR AVS SNAPSHOT
After Visit Summary   3/30/2018    Franklin Mejia    MRN: [id number]           Patient Information     Date Of Birth          1970        Visit Information        Provider Department      3/30/2018 3:15 PM Darrius Conti MD [facility]        Today's Diagnoses     Chalazion of right upper eyelid    -  1      Care Instructions    Warm packs to right eye three times daily   Keflex: 500 mg: One pill three times daily for 10 days.  Return visit as needed.    Darrius Conti M.D.            Follow-ups after your visit        Who to contact     If you have questions or need follow up information about today's clinic visit or your schedule please contact [facility] directly at [phone].  Normal or non-critical lab and imaging results will be communicated to you by [employer], letter or phone within 4 business days after the clinic has received the results. If you do not hear from us within 7 days, please contact the clinic through [employer] or phone. If you have a critical or abnormal lab result, we will notify you by phone as soon as possible.  Submit refill requests through [facility] or call your pharmacy and they will forward the refill request to us. Please allow 3 business days for your refill to be completed.          Additional Information About Your Visit        MyChart Information     [facility] [employer] gives you secure access to your electronic health record. If you see a primary care provider, you can also send messages to your care team and make appointments. If you have questions, please call your primary care clinic.  If you do not have a primary care provider, please call [phone] and they will assist you.        Care EveryWhere ID     This is your Care EveryWhere ID. This could be used by other organizations to access your [facility] medical records  [id number]         Blood Pressure from Last 3 Encounters:   03/05/18 124/71   11/13/17 135/79   10/27/17 129/73     Weight from Last 3 Encounters:   03/05/18 88.5 kg (195 lb)   11/13/17 90.7 kg (200 lb)   10/27/17 93 kg (205 lb)              Today, you had the following     No orders found for display         Today's Medication Changes          These changes are accurate as of 3/30/18  4:02 PM.  If you have any questions, ask your nurse or doctor.               Start taking these medicines.        Dose/Directions    cephALEXin 500 MG capsule   Commonly known as:  KEFLEX   Used for:  Chalazion of right upper eyelid   Started by:  Darrius Conti MD        Dose:  500 mg   Take 1 capsule (500 mg) by mouth 3 times daily   Quantity:  30 capsule   Refills:  1            Where to get your medicines      These medications were sent [place] [facility] [place] - [address]  [address], [facility] [address]     Phone:  [phone]     cephALEXin 500 MG capsule                Primary Care Provider Office Phone # Fax #    Branden Esquivel [phone]       [address] [address]        Equal Access to Services     [facility]: Hadii aad ku hadasho Soomaali, waaxda luqadaha, qaybta kaalmada adeegyada, june carrillo . So [facility] [phone].    ATENCIÓN: Si habla español, tiene a bajwa disposición servicios gratuitos de asistencia lingüística. Llame al [phone].    We comply with applicable federal civil rights laws and [place] laws. We do not discriminate on the basis of race, color, national origin, age, disability, sex, sexual orientation, or gender identity.            Thank you!     Thank you for choosing [facility]  for your care. Our goal is always to provide you with excellent care. Hearing back from our patients is one way we can continue to improve our services. Please take a few minutes to complete the written survey that you may receive in the mail after your visit with us. Thank you!              Your Updated Medication List - Protect others around you: Learn how to safely use, store and throw away your medicines at www.disposemymeds.org.          This list is accurate as of 3/30/18  4:02 PM.  Always use your most recent med list.                   Brand Name Dispense Instructions for use Diagnosis    CENTRUM PO      Take 1 tablet by mouth daily        cephALEXin 500 MG capsule    KEFLEX    30 capsule    Take 1 capsule (500 mg) by mouth 3 times daily    Chalazion of right upper eyelid

## 2018-03-30 NOTE — PATIENT INSTRUCTIONS
Warm packs to right eye three times daily   Keflex: 500 mg: One pill three times daily for 10 days.  Return visit as needed.    Darrius Conti M.D.

## 2018-03-30 NOTE — PROGRESS NOTES
Current Eye Medications:  None.       Subjective:  Starting on 3-28-18, his right upper eyelid was uncomfortable, and was bothered by his contact lens wear.  He uses hot packs yesterday about 6 times.  He woke this morning with right upper eyelid red, swollen, tender to touch, and he is able to feel a bump.       Objective:  See Ophthalmology Exam.       Assessment:  Acute chalazion right upper lid.      Plan:  Warm packs to right eye three times daily   Keflex: 500 mg: One pill three times daily for 10 days.  Encouraged to discuss smokeless tobacco cessation with PCP.  Return visit as needed.    Darrius Conti M.D.

## 2018-03-31 ASSESSMENT — EXTERNAL EXAM - RIGHT EYE: OD_EXAM: NORMAL

## 2018-03-31 ASSESSMENT — SLIT LAMP EXAM - LIDS: COMMENTS: NORMAL

## 2018-03-31 ASSESSMENT — EXTERNAL EXAM - LEFT EYE: OS_EXAM: NORMAL

## 2018-07-11 ENCOUNTER — OFFICE VISIT (OUTPATIENT)
Dept: FAMILY MEDICINE | Facility: CLINIC | Age: 48
End: 2018-07-11
Payer: COMMERCIAL

## 2018-07-11 VITALS
TEMPERATURE: 98 F | SYSTOLIC BLOOD PRESSURE: 122 MMHG | HEART RATE: 53 BPM | OXYGEN SATURATION: 98 % | DIASTOLIC BLOOD PRESSURE: 75 MMHG | BODY MASS INDEX: 24.9 KG/M2 | WEIGHT: 194 LBS | HEIGHT: 74 IN

## 2018-07-11 DIAGNOSIS — Z00.00 ROUTINE GENERAL MEDICAL EXAMINATION AT A HEALTH CARE FACILITY: Primary | ICD-10-CM

## 2018-07-11 DIAGNOSIS — Z12.11 SCREEN FOR COLON CANCER: ICD-10-CM

## 2018-07-11 DIAGNOSIS — K58.2 IRRITABLE BOWEL SYNDROME WITH BOTH CONSTIPATION AND DIARRHEA: ICD-10-CM

## 2018-07-11 PROBLEM — H00.11 CHALAZION OF RIGHT UPPER EYELID: Status: RESOLVED | Noted: 2018-03-30 | Resolved: 2018-07-11

## 2018-07-11 PROCEDURE — 99212 OFFICE O/P EST SF 10 MIN: CPT | Mod: 25 | Performed by: FAMILY MEDICINE

## 2018-07-11 PROCEDURE — 99396 PREV VISIT EST AGE 40-64: CPT | Performed by: FAMILY MEDICINE

## 2018-07-11 NOTE — PROGRESS NOTES
SUBJECTIVE:   CC: Franklin Mejia is an 48 year old male who presents for a preventative health visit.     Physical   Annual:     Getting at least 3 servings of Calcium per day:  NO    Bi-annual eye exam:  Yes    Dental care twice a year:  Yes    Sleep apnea or symptoms of sleep apnea:  Daytime drowsiness    Diet:  Regular (no restrictions)    Frequency of exercise:  2-3 days/week    Duration of exercise:  15-30 minutes    Taking medications regularly:  Yes    Medication side effects:  Not applicable    Additional concerns today:  YES    Other concerns:     Abdominal/Flank Pain  Duration of complaint: Abdominal pain off and on. About 6 weeks ago had an episode of vomiting.    He does oftentimes have alternating diarrhea and constipation.  Depends on what he eats and drinks.  No blood in the stools.  No further vomiting recently.  No unexplained weight loss or other red flag symptoms.  He did turn age 48 last month so is now due for a screening colonoscopy.  He is open to doing that.  He is on no routine meds currently.  He does use chewing tobacco, generally about 4 tins per week.    Today's PHQ-2 Score:   PHQ-2 ( 1999 [employer]) 7/10/2018   Q1: Little interest or pleasure in doing things 0   Q2: Feeling down, depressed or hopeless 0   PHQ-2 Score 0   Q1: Little interest or pleasure in doing things Not at all   Q2: Feeling down, depressed or hopeless Not at all   PHQ-2 Score 0       Abuse: Current or Past(Physical, Sexual or Emotional)- No  Do you feel safe in your environment - Yes    Social History   Substance Use Topics     Smoking status: Former Smoker     Quit date: 8/26/2006     Smokeless tobacco: Current User     Types: Chew     Alcohol use 0.0 oz/week     0 Standard drinks or equivalent per week      Comment: occasional      Alcohol Use 7/10/2018   If you drink alcohol do you typically have greater than 3 drinks per day OR greater than 7 drinks per week? No       Last PSA: No results found for:  "PSA    Reviewed orders with patient. Reviewed health maintenance and updated orders accordingly - Yes  Patient Active Problem List   Diagnosis     Overweight (BMI 25.0-29.9)     Past Surgical History:   Procedure Laterality Date     APPENDECTOMY  1983       Social History   Substance Use Topics     Smoking status: Former Smoker     Quit date: 8/26/2006     Smokeless tobacco: Current User     Types: Chew     Alcohol use 0.0 oz/week     0 Standard drinks or equivalent per week      Comment: occasional      Family History   Problem Relation Age of Onset     Lipids Mother      Hypertension Father      Macular Degeneration Father      Diabetes Father      Diabetes Brother      Cerebrovascular Disease No family hx of      Thyroid Disease No family hx of      Glaucoma No family hx of      Cancer No family hx of          No current outpatient prescriptions on file.     No Known Allergies    Reviewed and updated as needed this visit by clinical staff  Tobacco  Allergies  Meds  Med Hx  Surg Hx  Fam Hx  Soc Hx        Reviewed and updated as needed this visit by Provider            Review of Systems  CONSTITUTIONAL: NEGATIVE for fever, chills, change in weight  INTEGUMENTARY/SKIN: NEGATIVE for worrisome rashes, moles or lesions  EYES: NEGATIVE for vision changes or irritation  ENT: NEGATIVE for ear, mouth and throat problems  RESP: NEGATIVE for significant cough or SOB  CV: NEGATIVE for chest pain, palpitations or peripheral edema  GI: see above   male: negative for dysuria, hematuria, decreased urinary stream, erectile dysfunction, urethral discharge  MUSCULOSKELETAL: NEGATIVE for significant arthralgias or myalgia  NEURO: NEGATIVE for weakness, dizziness or paresthesias  PSYCHIATRIC: NEGATIVE for changes in mood or affect    OBJECTIVE:   /75 (BP Location: Right arm, Patient Position: Chair, Cuff Size: Adult Regular)  Pulse 53  Temp 98  F (36.7  C) (Oral)  Ht 6' 1.82\" (1.875 m)  Wt 194 lb (88 kg)  SpO2 98%  " "BMI 25.03 kg/m2    Physical Exam  GENERAL: healthy, alert and no distress  EYES: Eyes grossly normal to inspection, PERRL and conjunctivae and sclerae normal  HENT: ear canals and TM's normal, nose and mouth without ulcers or lesions  NECK: no adenopathy, no asymmetry, masses, or scars and thyroid normal to palpation  RESP: lungs clear to auscultation - no rales, rhonchi or wheezes  CV: regular rate and rhythm, normal S1 S2, no S3 or S4, no murmur, click or rub, no peripheral edema and peripheral pulses strong  ABDOMEN: soft, nontender, no hepatosplenomegaly, no masses and bowel sounds normal  MS: no gross musculoskeletal defects noted, no edema  SKIN: no suspicious lesions or rashes  NEURO: Normal strength and tone, mentation intact and speech normal  PSYCH: mentation appears normal, affect normal/bright    Diagnostic Test Results:  none     ASSESSMENT/PLAN:       ICD-10-CM    1. Routine general medical examination at a health care facility Z00.00 GLUCOSE     Lipid panel reflex to direct LDL Fasting     CBC with platelets     Prostate spec antigen screen   2. Irritable bowel syndrome with both constipation and diarrhea K58.2    3. Screen for colon cancer Z12.11 GASTROENTEROLOGY ADULT REF PROCEDURE ONLY Other; [place] [facility] [phone]     Blood pressure and other vital signs look good  He seems to have some IBS symptoms  We discussed dietary management for that  He was referred for a screening colonoscopy  We will have him return soon for some screening fasting lab work as above  He was encouraged to quit chewing tobacco  Plan a recheck in 1 year, or sooner prn    COUNSELING:   Reviewed preventive health counseling, as reflected in patient instructions       Regular exercise       Healthy diet/nutrition    BP Readings from Last 1 Encounters:   03/05/18 124/71     Estimated body mass index is 24.37 kg/(m^2) as calculated from the following:    Height as of 3/5/18: 6' 3\" (1.905 m).    Weight as of 3/5/18: 195 lb (88.5 " kg).           reports that he quit smoking about 11 years ago. His smokeless tobacco use includes Chew.  Tobacco Cessation Action Plan: Self help information given to patient    Counseling Resources:  ATP IV Guidelines  Pooled Cohorts Equation Calculator  FRAX Risk Assessment  ICSI Preventive Guidelines  Dietary Guidelines for Americans, 2010  USDA's MyPlate  ASA Prophylaxis  Lung CA Screening    Branden Esquivel MD  [facility]    Answers for HPI/ROS submitted by the patient on 7/10/2018   PHQ-2 Score: 0

## 2018-07-11 NOTE — MR AVS SNAPSHOT
After Visit Summary   7/11/2018    Franklin Mejia    MRN: [id number]           Patient Information     Date Of Birth          1970        Visit Information        Provider Department      7/11/2018 2:00 PM Branden Esquivel MD [facility]        Today's Diagnoses     Routine general medical examination at a health care facility    -  1    Irritable bowel syndrome with both constipation and diarrhea        Screen for colon cancer          Care Instructions      Preventive Health Recommendations  Male Ages 50 - 64    Yearly exam:             See your health care provider every year in order to  o   Review health changes.   o   Discuss preventive care.    o   Review your medicines if your doctor has prescribed any.     Have a cholesterol test every 5 years, or more frequently if you are at risk for high cholesterol/heart disease.     Have a diabetes test (fasting glucose) every three years. If you are at risk for diabetes, you should have this test more often.     Have a colonoscopy at age 50, or have a yearly FIT test (stool test). These exams will check for colon cancer.      Talk with your health care provider about whether or not a prostate cancer screening test (PSA) is right for you.    You should be tested each year for STDs (sexually transmitted diseases), if you re at risk.     Shots: Get a flu shot each year. Get a tetanus shot every 10 years.     Nutrition:    Eat at least 5 servings of fruits and vegetables daily.     Eat whole-grain bread, whole-wheat pasta and brown rice instead of white grains and rice.     Get adequate Calcium and Vitamin D.     Lifestyle    Exercise for at least 150 minutes a week (30 minutes a day, 5 days a week). This will help you control your weight and prevent disease.     Limit alcohol to one drink per day.     No smoking.     Wear sunscreen to prevent skin cancer.     See your dentist every six months for an exam and cleaning.      See your eye doctor every 1 to 2 years.            Follow-ups after your visit        Additional Services     GASTROENTEROLOGY ADULT REF PROCEDURE ONLY Other; [facility] [phone]       Last Lab Result: Creatinine (mg/dL)       Date                     Value                 03/29/2013               0.78             ----------  There is no height or weight on file to calculate BMI.      Patient will be contacted to schedule procedure.     Please be aware that coverage of these services is subject to the terms and limitations of your health insurance plan.  Call member services at your health plan with any benefit or coverage questions.  Any procedures must be performed at a [facility] facility OR coordinated by your clinic's referral office.    Please bring the following with you to your appointment:    (1) Any X-Rays, CTs or MRIs which have been performed.  Contact the facility where they were done to arrange for  prior to your scheduled appointment.    (2) List of current medications   (3) This referral request   (4) Any documents/labs given to you for this referral                  Follow-up notes from your care team     Return in about 1 year (around 7/11/2019).      Future tests that were ordered for you today     Open Future Orders        Priority Expected Expires Ordered    Prostate spec antigen screen Routine 7/17/2018 7/31/2018 7/11/2018    GLUCOSE Routine 7/17/2018 7/31/2018 7/11/2018    Lipid panel reflex to direct LDL Fasting Routine 7/17/2018 7/31/2018 7/11/2018    CBC with platelets Routine 7/17/2018 7/31/2018 7/11/2018            Who to contact     If you have questions or need follow up information about today's clinic visit or your schedule please contact [facility] directly at [phone].  Normal or non-critical lab and imaging results will be communicated to you by MyChart, letter or phone within 4 business days after the clinic has received the results. If you do  "not hear from us within 7 days, please contact the clinic through "[employer] or phone. If you have a critical or abnormal lab result, we will notify you by phone as soon as possible.  Submit refill requests through "[employer] or call your pharmacy and they will forward the refill request to us. Please allow 3 business days for your refill to be completed.          Additional Information About Your Visit        [facility] Information     "[employer] gives you secure access to your electronic health record. If you see a primary care provider, you can also send messages to your care team and make appointments. If you have questions, please call your primary care clinic.  If you do not have a primary care provider, please call [phone] and they will assist you.        Care EveryWhere ID     This is your Care EveryWhere ID. This could be used by other organizations to access your [place] medical records  [id number]        Your Vitals Were     Pulse Temperature Height Pulse Oximetry BMI (Body Mass Index)       53 98  F (36.7  C) (Oral) 6' 1.82\" (1.875 m) 98% 25.03 kg/m2        Blood Pressure from Last 3 Encounters:   07/11/18 122/75   03/05/18 124/71   11/13/17 135/79    Weight from Last 3 Encounters:   07/11/18 194 lb (88 kg)   03/05/18 195 lb (88.5 kg)   11/13/17 200 lb (90.7 kg)              We Performed the Following     GASTROENTEROLOGY ADULT REF PROCEDURE ONLY Other; [facility] [phone]        Primary Care Provider Office Phone # Fax #    Branden Esquivel [phone]       [address] [facility] [address]        Equal Access to Services     [facility] : Hadii angie bourgeois hadalma rosa Marsh, waaxda luqadaha, qaybta kaalmada silvino, june anaya. So [facility] [phone].    ATENCIÓN: Si habla español, tiene a bajwa disposición servicios gratuitos de asistencia lingüística. Llame al [phone].    We comply with applicable federal civil rights laws and [place] laws. We " do not discriminate on the basis of race, color, national origin, age, disability, sex, sexual orientation, or gender identity.            Thank you!     Thank you for choosing [facility]  for your care. Our goal is always to provide you with excellent care. Hearing back from our patients is one way we can continue to improve our services. Please take a few minutes to complete the written survey that you may receive in the mail after your visit with us. Thank you!             Your Updated Medication List - Protect others around you: Learn how to safely use, store and throw away your medicines at www.disposemymeds.org.      Notice  As of 7/11/2018  2:26 PM    You have not been prescribed any medications.

## 2018-07-17 DIAGNOSIS — Z00.00 ROUTINE GENERAL MEDICAL EXAMINATION AT A HEALTH CARE FACILITY: ICD-10-CM

## 2018-07-17 LAB
CHOLEST SERPL-MCNC: 177 MG/DL
ERYTHROCYTE [DISTWIDTH] IN BLOOD BY AUTOMATED COUNT: 14.3 % (ref 10–15)
GLUCOSE SERPL-MCNC: 94 MG/DL (ref 70–99)
HCT VFR BLD AUTO: 45.3 % (ref 40–53)
HDLC SERPL-MCNC: 61 MG/DL
HGB BLD-MCNC: 14.9 G/DL (ref 13.3–17.7)
LDLC SERPL CALC-MCNC: 96 MG/DL
MCH RBC QN AUTO: 28.4 PG (ref 26.5–33)
MCHC RBC AUTO-ENTMCNC: 32.9 G/DL (ref 31.5–36.5)
MCV RBC AUTO: 86 FL (ref 78–100)
NONHDLC SERPL-MCNC: 116 MG/DL
PLATELET # BLD AUTO: 212 10E9/L (ref 150–450)
PSA SERPL-ACNC: 0.45 UG/L (ref 0–4)
RBC # BLD AUTO: 5.25 10E12/L (ref 4.4–5.9)
TRIGL SERPL-MCNC: 100 MG/DL
WBC # BLD AUTO: 6.6 10E9/L (ref 4–11)

## 2018-07-17 PROCEDURE — 80061 LIPID PANEL: CPT | Performed by: FAMILY MEDICINE

## 2018-07-17 PROCEDURE — 36415 COLL VENOUS BLD VENIPUNCTURE: CPT | Performed by: FAMILY MEDICINE

## 2018-07-17 PROCEDURE — 85027 COMPLETE CBC AUTOMATED: CPT | Performed by: FAMILY MEDICINE

## 2018-07-17 PROCEDURE — 82947 ASSAY GLUCOSE BLOOD QUANT: CPT | Performed by: FAMILY MEDICINE

## 2018-07-17 PROCEDURE — G0103 PSA SCREENING: HCPCS | Performed by: FAMILY MEDICINE

## 2018-07-17 NOTE — PROGRESS NOTES
Jacoby,  These lab results look wonderful and are all nice and normal. Keep up the good work!    Branden Esquivel MD

## 2018-09-21 ENCOUNTER — TRANSFERRED RECORDS (OUTPATIENT)
Dept: HEALTH INFORMATION MANAGEMENT | Facility: CLINIC | Age: 48
End: 2018-09-21

## 2018-10-08 ENCOUNTER — OFFICE VISIT (OUTPATIENT)
Dept: OPTOMETRY | Facility: CLINIC | Age: 48
End: 2018-10-08
Payer: COMMERCIAL

## 2018-10-08 DIAGNOSIS — H52.4 MYOPIA OF BOTH EYES WITH ASTIGMATISM AND PRESBYOPIA: Primary | ICD-10-CM

## 2018-10-08 DIAGNOSIS — H52.13 MYOPIA OF BOTH EYES WITH ASTIGMATISM AND PRESBYOPIA: Primary | ICD-10-CM

## 2018-10-08 DIAGNOSIS — H52.203 MYOPIA OF BOTH EYES WITH ASTIGMATISM AND PRESBYOPIA: Primary | ICD-10-CM

## 2018-10-08 DIAGNOSIS — Z46.0 CONTACT LENS/GLASSES FITTING: ICD-10-CM

## 2018-10-08 PROCEDURE — 92014 COMPRE OPH EXAM EST PT 1/>: CPT | Performed by: OPTOMETRIST

## 2018-10-08 PROCEDURE — 92310 CONTACT LENS FITTING OU: CPT | Mod: GA | Performed by: OPTOMETRIST

## 2018-10-08 ASSESSMENT — REFRACTION_WEARINGRX
OS_SPHERE: -8.50
SPECS_TYPE: SVL
OS_CYLINDER: +1.25
OD_SPHERE: -9.25
OD_CYLINDER: +3.00
OS_AXIS: 173
OD_AXIS: 180

## 2018-10-08 ASSESSMENT — REFRACTION_CURRENTRX
OS_CYLINDER: -1.25
OD_BASECURVE: 8.50
OS_BASECURVE: 8.50
OS_AXIS: 080
OS_SPHERE: -6.50
OD_AXIS: 090
OD_DIAMETER: 14.5
OD_CYLINDER: -2.25
OD_SPHERE: -6.50
OS_DIAMETER: 14.5

## 2018-10-08 ASSESSMENT — VISUAL ACUITY
OS_CC: 20/60
OD_CC: 20/40 -1
METHOD: SNELLEN - LINEAR
CORRECTION_TYPE: CONTACTS
OS_CC: 20/20
OD_CC: 20/25

## 2018-10-08 ASSESSMENT — CONF VISUAL FIELD
OS_NORMAL: 1
OD_NORMAL: 1

## 2018-10-08 ASSESSMENT — REFRACTION_MANIFEST
OD_AXIS: 004
OS_AXIS: 170
OS_SPHERE: -8.50
OD_SPHERE: -10.25
OS_ADD: +1.25
OD_ADD: +1.25
OS_CYLINDER: +1.50
OD_CYLINDER: +3.00

## 2018-10-08 ASSESSMENT — TONOMETRY
OS_IOP_MMHG: 19
IOP_METHOD: APPLANATION
OD_IOP_MMHG: 19

## 2018-10-08 ASSESSMENT — CUP TO DISC RATIO
OD_RATIO: 0.5
OS_RATIO: 0.5

## 2018-10-08 ASSESSMENT — EXTERNAL EXAM - LEFT EYE: OS_EXAM: NORMAL

## 2018-10-08 ASSESSMENT — EXTERNAL EXAM - RIGHT EYE: OD_EXAM: NORMAL

## 2018-10-08 ASSESSMENT — SLIT LAMP EXAM - LIDS
COMMENTS: NORMAL
COMMENTS: NORMAL

## 2018-10-08 NOTE — LETTER
10/8/2018         RE: Franklin Mejia  [address]  [place] [address]        Dear Colleague,    Thank you for referring your patient, Franklin Mejia, to the [facility]. Please see a copy of my visit note below.    Chief Complaint   Patient presents with     COMPREHENSIVE EYE EXAM     Accompanied by self  Previous contact lens wearer? Yes:   Comfort of contact lenses : yes  Satisfied with current lenses: Yes        Last Eye Exam: 1 year ago  Dilated Previously: Yes    What are you currently using to see?  glasses and contacts-contact lense rx  2018, glasses are 20 years old    Distance Vision Acuity: Satisfied with vision    Near Vision Acuity: Not satisfied, wear readers with contacts, +1.50    Eye Comfort: good  Do you use eye drops? : No  Occupation or Hobbies: warehouse      Leann Whitten, Optometric Tech     Medical, surgical and family histories reviewed and updated 10/8/2018.       OBJECTIVE: See Ophthalmology exam    ASSESSMENT:    ICD-10-CM    1. Myopia of both eyes with astigmatism and presbyopia H52.13     H52.203     H52.4    2. Contact lens/glasses fitting Z46.0       PLAN:     Patient Instructions   Franklin was advised of today's exam findings.  Fill glasses prescription  Allow 2 weeks to adapt to change in glasses  Advised of progressive adaptation and need for good fit and larger frame.    Do not sleep in contact lenses. Health risks discussed.  Do not swim in contact lenses . Health risks discussed.  Use new contact solution in case every day.  Replace contacts every 2 weeks.  Contact lenses prescription released to patient today  Copy of glasses Rx provided today.  Return in 1 year for eye exam, or sooner if needed.      Haris Campbell O.D.  [facility]  [address] [place] [address]. [place]  [address]    [phone]           Again, thank you for allowing me to participate in the care of your patient.         Sincerely,        Haris Campbell, OD

## 2018-10-08 NOTE — PROGRESS NOTES
Chief Complaint   Patient presents with     COMPREHENSIVE EYE EXAM     Accompanied by self  Previous contact lens wearer? Yes:   Comfort of contact lenses : yes  Satisfied with current lenses: Yes        Last Eye Exam: 1 year ago  Dilated Previously: Yes    What are you currently using to see?  glasses and contacts-contact lense rx  2018, glasses are 20 years old    Distance Vision Acuity: Satisfied with vision    Near Vision Acuity: Not satisfied, wear readers with contacts, +1.50    Eye Comfort: good  Do you use eye drops? : No  Occupation or Hobbies: [employer] Whitten, Optometric Tech     Medical, surgical and family histories reviewed and updated 10/8/2018.       OBJECTIVE: See Ophthalmology exam    ASSESSMENT:    ICD-10-CM    1. Myopia of both eyes with astigmatism and presbyopia H52.13     H52.203     H52.4    2. Contact lens/glasses fitting Z46.0       PLAN:     Patient Instructions   Franklin was advised of today's exam findings.  Fill glasses prescription  Allow 2 weeks to adapt to change in glasses  Advised of progressive adaptation and need for good fit and larger frame.    Do not sleep in contact lenses. Health risks discussed.  Do not swim in contact lenses . Health risks discussed.  Use new contact solution in case every day.  Replace contacts every 2 weeks.  Contact lenses prescription released to patient today  Copy of glasses Rx provided today.  Return in 1 year for eye exam, or sooner if needed.      Haris Campbell O.D.  [address] [place]  [address]    [phone]

## 2018-10-08 NOTE — MR AVS SNAPSHOT
After Visit Summary   10/8/2018    Franklin Mejia    MRN: [id number]           Patient Information     Date Of Birth          1970        Visit Information        Provider Department      10/8/2018 1:00 PM Haris Campbell, [facility]        Today's Diagnoses     Myopia of both eyes with astigmatism and presbyopia    -  1    Contact lens/glasses fitting          Care Instructions    Franklin was advised of today's exam findings.  Fill glasses prescription  Allow 2 weeks to adapt to change in glasses  Advised of progressive adaptation and need for good fit and larger frame.    Do not sleep in contact lenses. Health risks discussed.  Do not swim in contact lenses . Health risks discussed.  Use new contact solution in case every day.  Replace contacts every 2 weeks.  Contact lenses prescription released to patient today  Copy of glasses Rx provided today.  Return in 1 year for eye exam, or sooner if needed.      Haris Campbell O.D.  [facility]  [address] [place] [address]. [place]  [address]    [phone]            Follow-ups after your visit        Follow-up notes from your care team     Return in about 1 year (around 10/8/2019) for Comprehensive Eye Exam.      Who to contact     If you have questions or need follow up information about today's clinic visit or your schedule please contact [facility] directly at [phone].  Normal or non-critical lab and imaging results will be communicated to you by MyChart, letter or phone within 4 business days after the clinic has received the results. If you do not hear from us within 7 days, please contact the clinic through MyChart or phone. If you have a critical or abnormal lab result, we will notify you by phone as soon as possible.  Submit refill requests through [employer] or call your pharmacy and they will forward the refill request to us. Please allow 3 business days for your refill to be  completed.          Additional Information About Your Visit        [employer] Information     [employer] gives you secure access to your electronic health record. If you see a primary care provider, you can also send messages to your care team and make appointments. If you have questions, please call your primary care clinic.  If you do not have a primary care provider, please call [phone] and they will assist you.        Care EveryWhere ID     This is your Care EveryWhere ID. This could be used by other organizations to access your [place] medical records  [id number]         Blood Pressure from Last 3 Encounters:   07/11/18 122/75   03/05/18 124/71   11/13/17 135/79    Weight from Last 3 Encounters:   07/11/18 88 kg (194 lb)   03/05/18 88.5 kg (195 lb)   11/13/17 90.7 kg (200 lb)              Today, you had the following     No orders found for display       Primary Care Provider Office Phone # Fax #    Branden Esquivel [phone]       [address] [facility] [address]        Equal Access to Services     [facility]: Hadii aad ku hadasho Soomaali, waaxda luqadaha, qaybta kaalmada adeegyada, waxbola carrillo . So [facility] [phone].    ATENCIÓN: Si habla español, tiene a bajwa disposición servicios gratuitos de asistencia lingüística. Llame al [phone].    We comply with applicable federal civil rights laws and [place] laws. We do not discriminate on the basis of race, color, national origin, age, disability, sex, sexual orientation, or gender identity.            Thank you!     Thank you for choosing [facility]  for your care. Our goal is always to provide you with excellent care. Hearing back from our patients is one way we can continue to improve our services. Please take a few minutes to complete the written survey that you may receive in the mail after your visit with us. Thank you!             Your Updated Medication List - Protect  others around you: Learn how to safely use, store and throw away your medicines at www.disposemymeds.org.      Notice  As of 10/8/2018  1:56 PM    You have not been prescribed any medications.

## 2018-10-08 NOTE — PATIENT INSTRUCTIONS
Franklin was advised of today's exam findings.  Fill glasses prescription  Allow 2 weeks to adapt to change in glasses  Advised of progressive adaptation and need for good fit and larger frame.    Do not sleep in contact lenses. Health risks discussed.  Do not swim in contact lenses . Health risks discussed.  Use new contact solution in case every day.  Replace contacts every 2 weeks.  Contact lenses prescription released to patient today  Copy of glasses Rx provided today.  Return in 1 year for eye exam, or sooner if needed.      Haris Campbell O.D.  [address] [place]  [address]    [phone]

## 2018-11-06 ENCOUNTER — TRANSFERRED RECORDS (OUTPATIENT)
Dept: HEALTH INFORMATION MANAGEMENT | Facility: CLINIC | Age: 48
End: 2018-11-06

## 2018-11-19 NOTE — LETTER
3/30/2018         RE: Franklin Mejia  [address]  [place] [address]        Dear Colleague,    Thank you for referring your patient, Franklin Mejia, to the [facility]. Please see a copy of my visit note below.     Current Eye Medications:  None.       Subjective:  Starting on 3-28-18, his right upper eyelid was uncomfortable, and was bothered by his contact lens wear.  He uses hot packs yesterday about 6 times.  He woke this morning with right upper eyelid red, swollen, tender to touch, and he is able to feel a bump.       Objective:  See Ophthalmology Exam.       Assessment:  Acute chalazion right upper lid.      Plan:  Warm packs to right eye three times daily   Keflex: 500 mg: One pill three times daily for 10 days.  Encouraged to discuss smokeless tobacco cessation with PCP.  Return visit as needed.    Darrius Conti M.D.             Again, thank you for allowing me to participate in the care of your patient.        Sincerely,        Darrius Conti MD     W Plasty Text: The lesion was extirpated to the level of the fat with a #15 scalpel blade.  Given the location of the defect, shape of the defect and the proximity to free margins a W-plasty was deemed most appropriate for repair.  Using a sterile surgical marker, the appropriate transposition arms of the W-plasty were drawn incorporating the defect and placing the expected incisions within the relaxed skin tension lines where possible.    The area thus outlined was incised deep to adipose tissue with a #15 scalpel blade.  The skin margins were undermined to an appropriate distance in all directions utilizing iris scissors.  The opposing transposition arms were then transposed into place in opposite direction and anchored with interrupted buried subcutaneous sutures.

## 2018-12-28 ENCOUNTER — FCC EXTENDED DOCUMENTATION (OUTPATIENT)
Dept: PSYCHOLOGY | Facility: CLINIC | Age: 48
End: 2018-12-28

## 2018-12-28 NOTE — PROGRESS NOTES
Discharge Summary  Multiple Sessions    Client Name: Franklin Mejia MRN#: [id number] YOB: 1970      Intake / Discharge Date: 2-14-17 and 9-7-17      DSM5 Diagnoses: (Sustained by DSM5 Criteria Listed Above)  Diagnoses: Attention-Deficit/Hyperactivity Disorder 314.00 (F90.0) Predominantly inattentive presentation  300.02 (F41.1) Generalized Anxiety Disorder  Psychosocial & Contextual Factors: Some relational stress and work stress  WHODAS 2.0 (12 item)  This questionnaire asks about difficulties due to health conditions. Health conditions include disease or illnesses, other health problems that may be short or long lasting, injuries, mental health or emotional problems, and problems with alcohol or drugs.      Think back over the past 30 days and answer these questions, thinking about how much difficulty you had doing the following activities. For each question, please [place] only one response.      S1 Standing for long periods such as 30 minutes? None = 1   S2 Taking care of household responsibilities? None = 1   S3 Learning a new task, for example, learning how to get to a new place? None = 1   S4 How much of a problem do you have joining community activities (for example, festivals, Rastafarian or other activities) in the same way as anyone else can? None = 1   S5 How much have you been emotionally affected by your health problems? None = 1               In the past 30 days, how much difficulty did you have in:   S6 Concentrating on doing something for ten minutes? Moderate = 3   S7 Walking a long distance such as a kilometer (or equivalent)? None = 1   S8 Washing your whole body? None = 1   S9 Getting dressed? None = 1   S10 Dealing with people you do not know? None = 1   S11 Maintaining a friendship? None = 1   S12 Your day to day work? None = 1       H1 Overall, in the past 30 days, how many days were these difficulties present? Record number of days 0   H2 In the past 30  days, for how many days were you totally unable to carry out your usual activities or work because of any health condition? Record number of days 0   H3 In the past 30 days, not counting the days that you were totally unable, for how many days did you cut back or reduce your usual activities or work because of any health condition? Record number of days 0                  Presenting Concern:  Anxiety and relational stressors       Reason for Discharge:  Client is satisfied with progress      Disposition at Time of Last Encounter:   Comments:   Client made progress in all goal areas while in therapy      Risk Management:   Client denies a history of suicidal ideation, suicide attempts, self-injurious behavior, homicidal ideation, homicidal behavior and and other safety concerns  A safety and risk management plan has not been developed at this time, however client was given the after-hours number / 911 should there be a change in any of these risk factors.      Referred To:  Does not apply         Cherri Wooten,    12/28/2018

## 2019-03-22 ENCOUNTER — OFFICE VISIT (OUTPATIENT)
Dept: FAMILY MEDICINE | Facility: CLINIC | Age: 49
End: 2019-03-22
Payer: COMMERCIAL

## 2019-03-22 VITALS
BODY MASS INDEX: 26.94 KG/M2 | WEIGHT: 203.25 LBS | HEIGHT: 73 IN | TEMPERATURE: 97.7 F | SYSTOLIC BLOOD PRESSURE: 126 MMHG | HEART RATE: 66 BPM | DIASTOLIC BLOOD PRESSURE: 78 MMHG

## 2019-03-22 DIAGNOSIS — R53.83 FATIGUE, UNSPECIFIED TYPE: ICD-10-CM

## 2019-03-22 DIAGNOSIS — M26.609 TMJ (TEMPOROMANDIBULAR JOINT SYNDROME): ICD-10-CM

## 2019-03-22 DIAGNOSIS — K21.9 GASTROESOPHAGEAL REFLUX DISEASE, ESOPHAGITIS PRESENCE NOT SPECIFIED: Primary | ICD-10-CM

## 2019-03-22 LAB
ALBUMIN SERPL-MCNC: 4.2 G/DL (ref 3.4–5)
ALP SERPL-CCNC: 73 U/L (ref 40–150)
ALT SERPL W P-5'-P-CCNC: 44 U/L (ref 0–70)
ANION GAP SERPL CALCULATED.3IONS-SCNC: 7 MMOL/L (ref 3–14)
AST SERPL W P-5'-P-CCNC: 21 U/L (ref 0–45)
BASOPHILS # BLD AUTO: 0 10E9/L (ref 0–0.2)
BASOPHILS NFR BLD AUTO: 0.6 %
BILIRUB SERPL-MCNC: 0.7 MG/DL (ref 0.2–1.3)
BUN SERPL-MCNC: 15 MG/DL (ref 7–30)
CALCIUM SERPL-MCNC: 10.9 MG/DL (ref 8.5–10.1)
CHLORIDE SERPL-SCNC: 110 MMOL/L (ref 94–109)
CO2 SERPL-SCNC: 24 MMOL/L (ref 20–32)
CREAT SERPL-MCNC: 0.72 MG/DL (ref 0.66–1.25)
DIFFERENTIAL METHOD BLD: NORMAL
EOSINOPHIL # BLD AUTO: 0.3 10E9/L (ref 0–0.7)
EOSINOPHIL NFR BLD AUTO: 4.1 %
ERYTHROCYTE [DISTWIDTH] IN BLOOD BY AUTOMATED COUNT: 14.4 % (ref 10–15)
GFR SERPL CREATININE-BSD FRML MDRD: >90 ML/MIN/{1.73_M2}
GLUCOSE SERPL-MCNC: 94 MG/DL (ref 70–99)
HCT VFR BLD AUTO: 46 % (ref 40–53)
HGB BLD-MCNC: 15.1 G/DL (ref 13.3–17.7)
LYMPHOCYTES # BLD AUTO: 1.9 10E9/L (ref 0.8–5.3)
LYMPHOCYTES NFR BLD AUTO: 28.2 %
MCH RBC QN AUTO: 28.1 PG (ref 26.5–33)
MCHC RBC AUTO-ENTMCNC: 32.8 G/DL (ref 31.5–36.5)
MCV RBC AUTO: 86 FL (ref 78–100)
MONOCYTES # BLD AUTO: 0.5 10E9/L (ref 0–1.3)
MONOCYTES NFR BLD AUTO: 8.1 %
NEUTROPHILS # BLD AUTO: 3.9 10E9/L (ref 1.6–8.3)
NEUTROPHILS NFR BLD AUTO: 59 %
PLATELET # BLD AUTO: 235 10E9/L (ref 150–450)
POTASSIUM SERPL-SCNC: 4.6 MMOL/L (ref 3.4–5.3)
PROT SERPL-MCNC: 7.4 G/DL (ref 6.8–8.8)
RBC # BLD AUTO: 5.37 10E12/L (ref 4.4–5.9)
SODIUM SERPL-SCNC: 141 MMOL/L (ref 133–144)
T4 FREE SERPL-MCNC: 0.79 NG/DL (ref 0.76–1.46)
TSH SERPL DL<=0.005 MIU/L-ACNC: 4.75 MU/L (ref 0.4–4)
WBC # BLD AUTO: 6.7 10E9/L (ref 4–11)

## 2019-03-22 PROCEDURE — 84439 ASSAY OF FREE THYROXINE: CPT | Performed by: FAMILY MEDICINE

## 2019-03-22 PROCEDURE — 99213 OFFICE O/P EST LOW 20 MIN: CPT | Performed by: FAMILY MEDICINE

## 2019-03-22 PROCEDURE — 85025 COMPLETE CBC W/AUTO DIFF WBC: CPT | Performed by: FAMILY MEDICINE

## 2019-03-22 PROCEDURE — 84443 ASSAY THYROID STIM HORMONE: CPT | Performed by: FAMILY MEDICINE

## 2019-03-22 PROCEDURE — 80053 COMPREHEN METABOLIC PANEL: CPT | Performed by: FAMILY MEDICINE

## 2019-03-22 PROCEDURE — 82306 VITAMIN D 25 HYDROXY: CPT | Performed by: FAMILY MEDICINE

## 2019-03-22 PROCEDURE — 36415 COLL VENOUS BLD VENIPUNCTURE: CPT | Performed by: FAMILY MEDICINE

## 2019-03-22 PROCEDURE — 84403 ASSAY OF TOTAL TESTOSTERONE: CPT | Performed by: FAMILY MEDICINE

## 2019-03-22 ASSESSMENT — MIFFLIN-ST. JEOR: SCORE: 1838.82

## 2019-03-22 ASSESSMENT — PAIN SCALES - GENERAL: PAINLEVEL: NO PAIN (0)

## 2019-03-22 NOTE — PROGRESS NOTES
SUBJECTIVE:   Franklin Mejia is a 48 year old male who presents to clinic today for the following health issues:       Always tired  Heartburn    none    Problem list and histories reviewed & adjusted, as indicated.  Additional history: as documented         Reviewed and updated as needed this visit by clinical staff  Tobacco  Allergies  Meds  Med Hx  Surg Hx  Fam Hx  Soc Hx      Reviewed and updated as needed this visit by Provider         Gave up chewing tobacco    Clench teeth    Grind teeth    tmj    Wears mouthguard nightly    For 2 years now    Active job    Some acid taste/ burn    Not on anything now    Some days worse than others    5 hours of sleep nightly    Gets up real early    Lots of steps daily    Thousands    Lifting packages    No exercise outside of work    Napping more    No stopping breathing at night    No snoring    No sex drive    Tired all the time    Wt up    Golf a lot in summer    Knees ankles bothering    Patient would like to be 185 [address]    To go to tmj doctor    Bowels and bladder fine    Fairly healthy diet      Physical Exam   Constitutional: He is oriented to person, place, and time. He appears well-developed and well-nourished.   HENT:   Head: Normocephalic and atraumatic.   Eyes: Conjunctivae are normal.   Neck: Carotid bruit is not present.   Cardiovascular: Normal rate, regular rhythm, normal heart sounds and intact distal pulses.   Pulmonary/Chest: Effort normal and breath sounds normal. No respiratory distress.   Abdominal: Soft. There is no tenderness.   Musculoskeletal: He exhibits no edema.   Neurological: He is alert and oriented to person, place, and time. No cranial nerve deficit.   Psychiatric: He has a normal mood and affect. His speech is normal and behavior is normal.   some clicking on tmj area when moving jaw     ASSESSMENT / PLAN:  (K21.9) Gastroesophageal reflux disease, esophagitis presence not specified  (primary encounter diagnosis)  Comment:  trial of h2 blocker on regular basis  Plan: ranitidine (ZANTAC) 150 MG capsule        Follow up prn symptoms     (R53.83) Fatigue, unspecified type  Comment: prudent to do lab workup  Plan: TSH with free T4 reflex, Vitamin D Deficiency,         Comprehensive metabolic panel, CBC with         platelets differential, Testosterone total        Follow up prn symptoms.  Increase exercise.     (M26.609) TMJ (temporomandibular joint syndrome)  Comment: patient to see specialist for this   Plan: as above       I reviewed the patient's medications, allergies, medical history, family history, and social history.    Eben Vásquez MD

## 2019-03-23 LAB — DEPRECATED CALCIDIOL+CALCIFEROL SERPL-MC: 13 UG/L (ref 20–75)

## 2019-03-24 NOTE — RESULT ENCOUNTER NOTE
The vitamin D level is low.  This could be one factor in your fatigue.  Advise taking 2000 units of over the counter vitamin D daily.    One thyroid test ( TSH ) is a bit off, but the follow up test ( free T4 ) is normal, so no thyroid medication needed.    The calcium level is a little high.  This may or may not be significant.    See us in a few months so we can recheck some labs.    Of note, we are still waiting for the testosterone result.    Eben Vásquez MD

## 2019-03-26 LAB — TESTOST SERPL-MCNC: 239 NG/DL (ref 240–950)

## 2019-03-27 NOTE — RESULT ENCOUNTER NOTE
The testosterone level is right at the low border of the normal range.  Usually it needs to be less than 200 to consider prescription testosterone.    Eben Vásquez MD

## 2019-05-20 ENCOUNTER — OFFICE VISIT (OUTPATIENT)
Dept: FAMILY MEDICINE | Facility: CLINIC | Age: 49
End: 2019-05-20
Payer: COMMERCIAL

## 2019-05-20 VITALS
SYSTOLIC BLOOD PRESSURE: 125 MMHG | HEART RATE: 76 BPM | WEIGHT: 211 LBS | TEMPERATURE: 98.5 F | BODY MASS INDEX: 27.69 KG/M2 | DIASTOLIC BLOOD PRESSURE: 75 MMHG

## 2019-05-20 DIAGNOSIS — M26.609 TMJ (TEMPOROMANDIBULAR JOINT SYNDROME): Primary | ICD-10-CM

## 2019-05-20 DIAGNOSIS — E66.3 OVERWEIGHT (BMI 25.0-29.9): ICD-10-CM

## 2019-05-20 DIAGNOSIS — K21.9 GASTROESOPHAGEAL REFLUX DISEASE, ESOPHAGITIS PRESENCE NOT SPECIFIED: ICD-10-CM

## 2019-05-20 DIAGNOSIS — E55.9 VITAMIN D DEFICIENCY DISEASE: ICD-10-CM

## 2019-05-20 DIAGNOSIS — R20.2 TINGLING: ICD-10-CM

## 2019-05-20 PROCEDURE — 99213 OFFICE O/P EST LOW 20 MIN: CPT | Performed by: FAMILY MEDICINE

## 2019-05-20 ASSESSMENT — PAIN SCALES - GENERAL: PAINLEVEL: NO PAIN (0)

## 2019-05-20 NOTE — PATIENT INSTRUCTIONS
Stop meds, restart slowly one at a time to determine culprit med    Call and schedule with tmj clinic     Plan to recheck labs at you physical    Monitor vision and other symptoms

## 2019-06-04 ENCOUNTER — HOSPITAL ENCOUNTER (EMERGENCY)
Facility: CLINIC | Age: 49
Discharge: HOME | End: 2019-06-04
Attending: PHYSICIAN ASSISTANT | Admitting: PHYSICIAN ASSISTANT
Payer: COMMERCIAL

## 2019-06-04 VITALS
TEMPERATURE: 98 F | SYSTOLIC BLOOD PRESSURE: 124 MMHG | OXYGEN SATURATION: 99 % | WEIGHT: 202 LBS | DIASTOLIC BLOOD PRESSURE: 80 MMHG | HEART RATE: 80 BPM | BODY MASS INDEX: 26.51 KG/M2

## 2019-06-04 DIAGNOSIS — M25.50 POLYARTHRALGIA: ICD-10-CM

## 2019-06-04 DIAGNOSIS — R19.7 DIARRHEA: ICD-10-CM

## 2019-06-04 LAB
ALBUMIN SERPL-MCNC: 3.7 G/DL (ref 3.4–5)
ALP SERPL-CCNC: 73 U/L (ref 40–150)
ALT SERPL W P-5'-P-CCNC: 27 U/L (ref 0–70)
ANION GAP SERPL CALCULATED.3IONS-SCNC: 3 MMOL/L (ref 3–14)
AST SERPL W P-5'-P-CCNC: 11 U/L (ref 0–45)
BASOPHILS # BLD AUTO: 0 10E9/L (ref 0–0.2)
BASOPHILS NFR BLD AUTO: 0.7 %
BILIRUB SERPL-MCNC: 0.4 MG/DL (ref 0.2–1.3)
BUN SERPL-MCNC: 12 MG/DL (ref 7–30)
CALCIUM SERPL-MCNC: 10.5 MG/DL (ref 8.5–10.1)
CHLORIDE SERPL-SCNC: 112 MMOL/L (ref 94–109)
CO2 SERPL-SCNC: 27 MMOL/L (ref 20–32)
CREAT SERPL-MCNC: 0.82 MG/DL (ref 0.66–1.25)
DIFFERENTIAL METHOD BLD: NORMAL
EOSINOPHIL # BLD AUTO: 0.2 10E9/L (ref 0–0.7)
EOSINOPHIL NFR BLD AUTO: 4.2 %
ERYTHROCYTE [DISTWIDTH] IN BLOOD BY AUTOMATED COUNT: 13.5 % (ref 10–15)
GFR SERPL CREATININE-BSD FRML MDRD: >90 ML/MIN/{1.73_M2}
GLUCOSE SERPL-MCNC: 88 MG/DL (ref 70–99)
HCT VFR BLD AUTO: 43.2 % (ref 40–53)
HGB BLD-MCNC: 13.9 G/DL (ref 13.3–17.7)
IMM GRANULOCYTES # BLD: 0 10E9/L (ref 0–0.4)
IMM GRANULOCYTES NFR BLD: 0.2 %
LYMPHOCYTES # BLD AUTO: 1.2 10E9/L (ref 0.8–5.3)
LYMPHOCYTES NFR BLD AUTO: 22.2 %
MCH RBC QN AUTO: 27.9 PG (ref 26.5–33)
MCHC RBC AUTO-ENTMCNC: 32.2 G/DL (ref 31.5–36.5)
MCV RBC AUTO: 87 FL (ref 78–100)
MONOCYTES # BLD AUTO: 0.7 10E9/L (ref 0–1.3)
MONOCYTES NFR BLD AUTO: 12.3 %
NEUTROPHILS # BLD AUTO: 3.3 10E9/L (ref 1.6–8.3)
NEUTROPHILS NFR BLD AUTO: 60.4 %
NRBC # BLD AUTO: 0 10*3/UL
NRBC BLD AUTO-RTO: 0 /100
PLATELET # BLD AUTO: 193 10E9/L (ref 150–450)
POTASSIUM SERPL-SCNC: 4 MMOL/L (ref 3.4–5.3)
PROT SERPL-MCNC: 6.6 G/DL (ref 6.8–8.8)
RBC # BLD AUTO: 4.98 10E12/L (ref 4.4–5.9)
SODIUM SERPL-SCNC: 142 MMOL/L (ref 133–144)
WBC # BLD AUTO: 5.5 10E9/L (ref 4–11)

## 2019-06-04 PROCEDURE — 85025 COMPLETE CBC W/AUTO DIFF WBC: CPT | Performed by: PHYSICIAN ASSISTANT

## 2019-06-04 PROCEDURE — 99283 EMERGENCY DEPT VISIT LOW MDM: CPT | Performed by: PHYSICIAN ASSISTANT

## 2019-06-04 PROCEDURE — 80053 COMPREHEN METABOLIC PANEL: CPT | Performed by: PHYSICIAN ASSISTANT

## 2019-06-04 PROCEDURE — 99283 EMERGENCY DEPT VISIT LOW MDM: CPT | Mod: Z6 | Performed by: PHYSICIAN ASSISTANT

## 2019-06-04 ASSESSMENT — ENCOUNTER SYMPTOMS
CHILLS: 0
VOMITING: 0
ANAL BLEEDING: 0
NEUROLOGICAL NEGATIVE: 1
BLOOD IN STOOL: 0
DIARRHEA: 1
FEVER: 0
NAUSEA: 0
PSYCHIATRIC NEGATIVE: 1
FATIGUE: 0
DIAPHORESIS: 0
MUSCULOSKELETAL NEGATIVE: 1
HEMATOLOGIC/LYMPHATIC NEGATIVE: 1
CARDIOVASCULAR NEGATIVE: 1
RESPIRATORY NEGATIVE: 1
ABDOMINAL DISTENTION: 0
RECTAL PAIN: 0

## 2019-06-04 NOTE — DISCHARGE INSTRUCTIONS
Patient to continue increased fluids, rest, bland diet.     Patient has appointment for recheck with Dr. Akiko Chatman on 6/7/19 (Friday) at 10:40am.     Patient to return to the Emergency Room if fevers occur, joint swelling, redness, warmth, decreased range of motion, abdominal pain, signs/symptoms of dehydration occur.

## 2019-06-04 NOTE — ED AVS SNAPSHOT
[facility] Emergency Department  [address] [address]  Phone:  [phone]  Fax:  [phone]                                    Franklin Mejia   MRN: [id number]    Department:  [facility] Emergency Department   Date of Visit:  6/4/2019           After Visit Summary Signature Page    I have received my discharge instructions, and my questions have been answered. I have discussed any challenges I see with this plan with the nurse or doctor.    ..........................................................................................................................................  Patient/Patient Representative Signature      ..........................................................................................................................................  Patient Representative Print Name and Relationship to Patient    ..................................................               ................................................  Date                                   Time    ..........................................................................................................................................  Reviewed by Signature/Title    ...................................................              ..............................................  Date                                               Time          22EPIC Rev 08/18

## 2019-06-04 NOTE — ED NOTES
"Pt c/o diarrhea mult times a day since Sunday.  No abd pain or fevers.  Pt able to drink and eat \"but it goes right through me.\"  Last night developed joint pain.  Pain comes and goes.  No recent illness prior to diarrhea.  "

## 2019-06-04 NOTE — ED PROVIDER NOTES
History     Chief Complaint   Patient presents with     Diarrhea     Started Sunday     Joint Pain     wrists and legs     HPI  Franklin Mejia is a 48 year old male who has no significant past medical history presents to the Emergency Room with watery diarrhea that started 3 days ago and bilateral wrist, knee, and ankle pain that started last night and has been on and off since.  He has had several episodes of watery bowel movements throughout the past 3 days (patient states anywhere from 5-10 episodes; whenever he eats), but denies abdominal pain, bloody or black tarry stools, urinary symptoms, back pain, nausea or vomiting, fevers, chest pain or shortness of breath, recent travel, recent camping, no known bad foods (states the only different food he had was a brat on the grill on Saturday night that no one else ate), no new medications or exposures.  Patient denies any swelling to the joints, decreased range of motion of the joints, or redness to the joints.  No recent known tick bites.  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  Patient states he feels great except for the fact that the last night joint pain in the wrists and lower extremities have started.  Patient has been eating and drinking well and does not feel dehydrated or lightheaded with a headache.  Patient denies similar symptoms in the past.  Patient states he has been taking Imodium for the past couple of days with no improvement of symptoms.  States he has not taken anything for the joint pain because currently it is gone and pain seems to come and go.  Patient denies any recent illness, sore throat, runny nose, cough, congestion.    Allergies:  No Known Allergies    Problem List:    Patient Active Problem List    Diagnosis Date Noted     Overweight (BMI 25.0-29.9) 12/17/2015     Priority: Medium        Past Medical History:    No past medical history on file.    Past Surgical History:    Past Surgical  History:   Procedure Laterality Date     APPENDECTOMY         Family History:    Family History   Problem Relation Age of Onset     Lipids Mother      Hypertension Father          copd 81     Macular Degeneration Father      Diabetes Father      Diabetes Brother      Cerebrovascular Disease No family hx of      Thyroid Disease No family hx of      Glaucoma No family hx of      Cancer No family hx of        Social History:  Marital Status:   [2]  Social History     Tobacco Use     Smoking status: Former Smoker     Last attempt to quit: 2006     Years since quittin.7     Smokeless tobacco: Former User     Types: Chew     Quit date: 2018   Substance Use Topics     Alcohol use: Yes     Alcohol/week: 0.0 oz     Comment: occasional      Drug use: No        Medications:      ranitidine (ZANTAC) 150 MG capsule         Review of Systems   Constitutional: Negative for chills, diaphoresis, fatigue and fever.   HENT: Negative.    Respiratory: Negative.    Cardiovascular: Negative.    Gastrointestinal: Positive for diarrhea. Negative for abdominal distention, anal bleeding, blood in stool, nausea, rectal pain and vomiting.   Genitourinary: Negative.    Musculoskeletal: Negative.    Skin: Negative.    Neurological: Negative.    Hematological: Negative.    Psychiatric/Behavioral: Negative.    All other systems reviewed and are negative.      Physical Exam   BP: 128/88  Pulse: 80  Heart Rate: 83  Temp: 98  F (36.7  C)  Weight: 91.6 kg (202 lb)  SpO2: 97 %      Physical Exam   Constitutional: He is oriented to person, place, and time. He appears well-developed and well-nourished. No distress.   HENT:   Head: Normocephalic and atraumatic.   Right Ear: External ear normal.   Left Ear: External ear normal.   Nose: Nose normal.   Mouth/Throat: Oropharynx is clear and moist. No oropharyngeal exudate.   Eyes: Pupils are equal, round, and reactive to light. Conjunctivae and EOM are normal. No scleral icterus.    Neck: Normal range of motion.   Cardiovascular: Normal rate, regular rhythm and normal heart sounds.   Pulmonary/Chest: Effort normal and breath sounds normal.   Abdominal: Soft. Bowel sounds are normal. He exhibits no distension and no mass. There is no tenderness. There is no rebound and no guarding. No hernia.   Musculoskeletal: Normal range of motion. He exhibits no edema or tenderness.        Right wrist: Normal.        Left wrist: Normal.        Right knee: Normal.        Left knee: Normal.        Right ankle: Normal.        Left ankle: Normal.   Patient with full range of motion in upper and lower bilateral extremities and joints, no erythema, swelling, or tenderness with palpation over the upper lower bilateral extremities and joints.   Neurological: He is alert and oriented to person, place, and time. He has normal strength. No sensory deficit. Gait normal. GCS eye subscore is 4. GCS verbal subscore is 5. GCS motor subscore is 6.   Skin: Skin is warm. Capillary refill takes less than 2 seconds. No rash noted. He is not diaphoretic. No erythema.   Psychiatric: He has a normal mood and affect. His behavior is normal. Judgment and thought content normal.   Nursing note and vitals reviewed.      ED Course        Procedures              Critical Care time:  none  Patient offered Tylenol or ibuprofen in office today for joint pain, but patient declined.             Results for orders placed or performed during the hospital encounter of 06/04/19 (from the past 24 hour(s))   CBC with platelets differential   Result Value Ref Range    WBC 5.5 4.0 - 11.0 10e9/L    RBC Count 4.98 4.4 - 5.9 10e12/L    Hemoglobin 13.9 13.3 - 17.7 g/dL    Hematocrit 43.2 40.0 - 53.0 %    MCV 87 78 - 100 fl    MCH 27.9 26.5 - 33.0 pg    MCHC 32.2 31.5 - 36.5 g/dL    RDW 13.5 10.0 - 15.0 %    Platelet Count 193 150 - 450 10e9/L    Diff Method Automated Method     % Neutrophils 60.4 %    % Lymphocytes 22.2 %    % Monocytes 12.3 %    %  Eosinophils 4.2 %    % Basophils 0.7 %    % Immature Granulocytes 0.2 %    Nucleated RBCs 0 0 /100    Absolute Neutrophil 3.3 1.6 - 8.3 10e9/L    Absolute Lymphocytes 1.2 0.8 - 5.3 10e9/L    Absolute Monocytes 0.7 0.0 - 1.3 10e9/L    Absolute Eosinophils 0.2 0.0 - 0.7 10e9/L    Absolute Basophils 0.0 0.0 - 0.2 10e9/L    Abs Immature Granulocytes 0.0 0 - 0.4 10e9/L    Absolute Nucleated RBC 0.0    Comprehensive metabolic panel   Result Value Ref Range    Sodium 142 133 - 144 mmol/L    Potassium 4.0 3.4 - 5.3 mmol/L    Chloride 112 (H) 94 - 109 mmol/L    Carbon Dioxide 27 20 - 32 mmol/L    Anion Gap 3 3 - 14 mmol/L    Glucose 88 70 - 99 mg/dL    Urea Nitrogen 12 7 - 30 mg/dL    Creatinine 0.82 0.66 - 1.25 mg/dL    GFR Estimate >90 >60 mL/min/[1.73_m2]    GFR Estimate If Black >90 >60 mL/min/[1.73_m2]    Calcium 10.5 (H) 8.5 - 10.1 mg/dL    Bilirubin Total 0.4 0.2 - 1.3 mg/dL    Albumin 3.7 3.4 - 5.0 g/dL    Protein Total 6.6 (L) 6.8 - 8.8 g/dL    Alkaline Phosphatase 73 40 - 150 U/L    ALT 27 0 - 70 U/L    AST 11 0 - 45 U/L       Medications - No data to display    Assessments & Plan (with Medical Decision Making)     I have reviewed the nursing notes.    I have reviewed the findings, diagnosis, plan and need for follow up with the patient.  Franklin Mejia is a 48 year old male who has no significant past medical history presents to the Emergency Room with watery diarrhea that started 3 days ago and bilateral wrist, knee, and ankle pain that started last night and has been on and off since.  He has had several episodes of watery bowel movements throughout the past 3 days (patient states anywhere from 5-10 episodes; whenever he eats), but denies abdominal pain, bloody or black tarry stools, urinary symptoms, back pain, nausea or vomiting, fevers, chest pain or shortness of breath, recent travel, recent camping, no known bad foods (states the only different food he had was a brat on the grill on Saturday night  that no one else ate), no new medications or exposures.  Patient denies any swelling to the joints, decreased range of motion of the joints, or redness to the joints.  No recent known tick bites.  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  Patient states he feels great except for the fact that the last night joint pain in the wrists and lower extremities have started.  Patient has been eating and drinking well and does not feel dehydrated or lightheaded with a headache.  Patient denies similar symptoms in the past.  Patient states he has been taking Imodium for the past couple of days with no improvement of symptoms.  States he has not taken anything for the joint pain because currently it is gone and pain seems to come and go.  Patient denies any recent illness, sore throat, runny nose, cough, congestion.  CBC and CMP obtained in office today with CBC being within normal range.  On the CMP protein total 6.6, calcium slightly elevated at 10.5, and chloride slightly high at 112.  No concerns for acute abdomen or a septic arthritis due to no fevers, no erythema, decreased range of motion, or joint swelling in the joints.  Differential diagnoses include viral illness versus arthritis flareup versus Shigella which I have low suspicion for since he has not been traveling or had any recently camping.  Discussed with patient obtaining a Lyme's titer or further work-up with labs and stool cultures if symptoms persist in the next 3-4 days. Patient has appointment with Akiko Chatman on Friday 6/7/19 at 10:40am for recheck and further evaluation if diarrhea or joint pain persists.  Patient to return to the emergency department if signs or symptoms of dehydration occur, fevers occur, redness or swelling or decreased range of motion of the joints occur, abdominal pain, back pain, urinary symptoms occur, headache or dizziness occur, or chest pain/shortness of breath occur.  Patient given  discharge information and discharged in stable condition.       Medication List      There are no discharge medications for this visit.         Final diagnoses:   Diarrhea   Polyarthralgia - bilateral wrists, knees, and ankles that come and go.       6/4/2019   [facility] EMERGENCY DEPARTMENT     Malia Jenkins PA-C  06/04/19 1507

## 2019-07-12 ENCOUNTER — OFFICE VISIT (OUTPATIENT)
Dept: FAMILY MEDICINE | Facility: CLINIC | Age: 49
End: 2019-07-12
Payer: COMMERCIAL

## 2019-07-12 VITALS
TEMPERATURE: 97.9 F | OXYGEN SATURATION: 97 % | WEIGHT: 207 LBS | HEIGHT: 74 IN | DIASTOLIC BLOOD PRESSURE: 78 MMHG | SYSTOLIC BLOOD PRESSURE: 114 MMHG | BODY MASS INDEX: 26.56 KG/M2 | HEART RATE: 65 BPM

## 2019-07-12 DIAGNOSIS — Z00.00 ROUTINE GENERAL MEDICAL EXAMINATION AT A HEALTH CARE FACILITY: Primary | ICD-10-CM

## 2019-07-12 DIAGNOSIS — D22.9 MULTIPLE BENIGN NEVI: ICD-10-CM

## 2019-07-12 DIAGNOSIS — E55.9 VITAMIN D DEFICIENCY: ICD-10-CM

## 2019-07-12 DIAGNOSIS — R53.83 OTHER FATIGUE: ICD-10-CM

## 2019-07-12 DIAGNOSIS — K21.9 GASTROESOPHAGEAL REFLUX DISEASE, ESOPHAGITIS PRESENCE NOT SPECIFIED: ICD-10-CM

## 2019-07-12 DIAGNOSIS — E66.3 OVERWEIGHT (BMI 25.0-29.9): ICD-10-CM

## 2019-07-12 PROCEDURE — 99213 OFFICE O/P EST LOW 20 MIN: CPT | Mod: 25 | Performed by: FAMILY MEDICINE

## 2019-07-12 PROCEDURE — 99396 PREV VISIT EST AGE 40-64: CPT | Performed by: FAMILY MEDICINE

## 2019-07-12 ASSESSMENT — ENCOUNTER SYMPTOMS
HEMATURIA: 0
SORE THROAT: 1
WEAKNESS: 0
PARESTHESIAS: 0
DYSURIA: 0
NERVOUS/ANXIOUS: 0
SHORTNESS OF BREATH: 0
ABDOMINAL PAIN: 0
CONSTIPATION: 0
HEMATOCHEZIA: 0
FREQUENCY: 1
HEARTBURN: 1
FEVER: 0
NAUSEA: 0
DIZZINESS: 0
DIARRHEA: 1
CHILLS: 0
HEADACHES: 0
MYALGIAS: 1
PALPITATIONS: 0
JOINT SWELLING: 0
EYE PAIN: 0
COUGH: 0
ARTHRALGIAS: 1

## 2019-07-12 ASSESSMENT — MIFFLIN-ST. JEOR: SCORE: 1863.7

## 2019-07-12 ASSESSMENT — PAIN SCALES - GENERAL: PAINLEVEL: NO PAIN (0)

## 2019-07-12 NOTE — PROGRESS NOTES
SUBJECTIVE:   CC: Franklin Mejia is an 49 year old male who presents for a preventative health visit and follow-up on some baseline.     Healthy Habits:     Getting at least 3 servings of Calcium per day:  NO    Bi-annual eye exam:  Yes    Dental care twice a year:  Yes    Sleep apnea or symptoms of sleep apnea:  Daytime drowsiness    Diet:  Regular (no restrictions)    Frequency of exercise:  6-7 days/week    Duration of exercise:  Greater than 60 minutes    Taking medications regularly:  Yes    Medication side effects:  Other    PHQ-2 Total Score: 0    Additional concerns today:  Yes    He complains of some fatigue.  He has had this for a number of months.  His work schedule is such that he gets up at 3:30 every morning and works until the midafternoon.  He typically does not go to bed until around 10 or 10:30 at night.  He did have some lab work done back in March and was found to be low in vitamin D.  He took a supplement for a short time, but then just stopped taking it.  He does take ranitidine for GERD.    Today's PHQ-2 Score:   PHQ-2 (  [employer]) 2019   Q1: Little interest or pleasure in doing things 0   Q2: Feeling down, depressed or hopeless 0   PHQ-2 Score 0   Q1: Little interest or pleasure in doing things Not at all   Q2: Feeling down, depressed or hopeless Not at all   PHQ-2 Score 0       Abuse: Current or Past(Physical, Sexual or Emotional)- No  Do you feel safe in your environment? Yes    Social History     Tobacco Use     Smoking status: Former Smoker     Last attempt to quit: 2006     Years since quittin.8     Smokeless tobacco: Former User     Types: Chew     Quit date: 2018   Substance Use Topics     Alcohol use: Yes     Alcohol/week: 0.0 oz     Comment: occasional        Alcohol Use 2019   Prescreen: >3 drinks/day or >7 drinks/week? Yes   Prescreen: >3 drinks/day or >7 drinks/week? -   AUDIT SCORE  4     AUDIT - Alcohol Use Disorders Identification Test -  Reproduced from the World Health Organization Audit 2001 (Second Edition) 2019   1.  How often do you have a drink containing alcohol? 4 or more times a week   2.  How many drinks containing alcohol do you have on a typical day when you are drinking? 1 or 2   3.  How often do you have five or more drinks on one occasion? Never   4.  How often during the last year have you found that you were not able to stop drinking once you had started? Never   5.  How often during the last year have you failed to do what was normally expected of you because of drinking? Never   6.  How often during the last year have you needed a first drink in the morning to get yourself going after a heavy drinking session? Never   7.  How often during the last year have you had a feeling of guilt or remorse after drinking? Never   8.  How often during the last year have you been unable to remember what happened the night before because of your drinking? Never   9.  Have you or someone else been injured because of your drinking? No   10. Has a relative, friend, doctor or other health care worker been concerned about your drinking or suggested you cut down? No   TOTAL SCORE 4       Last PSA:   PSA   Date Value Ref Range Status   2018 0.45 0 - 4 ug/L Final     Comment:     Assay Method:  Chemiluminescence using Siemens [place] analyzer     Candace Hermosillo MA      Reviewed orders with patient. Reviewed health maintenance and updated orders accordingly - Yes  Patient Active Problem List   Diagnosis     Gastroesophageal reflux disease, esophagitis presence not specified     Overweight (BMI 25.0-29.9)     Multiple benign nevi     Vitamin D deficiency     Past Surgical History:   Procedure Laterality Date     APPENDECTOMY         Social History     Tobacco Use     Smoking status: Former Smoker     Last attempt to quit: 2006     Years since quittin.8     Smokeless tobacco: Former User     Types: Chew     Quit date: 2018  "  Substance Use Topics     Alcohol use: Yes     Alcohol/week: 0.0 oz     Comment: occasional      Family History   Problem Relation Age of Onset     Lipids Mother      Hypertension Father          copd 81     Macular Degeneration Father      Diabetes Father      Diabetes Brother      Cerebrovascular Disease No family hx of      Thyroid Disease No family hx of      Glaucoma No family hx of      Cancer No family hx of          Current Outpatient Medications   Medication Sig Dispense Refill     ranitidine (ZANTAC) 150 MG capsule Take 1 capsule (150 mg) by mouth 2 times daily 60 capsule 11     No Known Allergies    Reviewed and updated as needed this visit by clinical staff         Reviewed and updated as needed this visit by Provider            Review of Systems   Constitutional: Negative for chills and fever.   HENT: Positive for ear pain and sore throat. Negative for congestion and hearing loss.    Eyes: Positive for visual disturbance. Negative for pain.   Respiratory: Negative for cough and shortness of breath.    Cardiovascular: Negative for chest pain, palpitations and peripheral edema.   Gastrointestinal: Positive for diarrhea and heartburn. Negative for abdominal pain, constipation, hematochezia and nausea.   Genitourinary: Positive for frequency and urgency. Negative for discharge, dysuria, genital sores, hematuria and impotence.   Musculoskeletal: Positive for arthralgias and myalgias. Negative for joint swelling.   Skin: Negative for rash.   Neurological: Negative for dizziness, weakness, headaches and paresthesias.   Psychiatric/Behavioral: Negative for mood changes. The patient is not nervous/anxious.          OBJECTIVE:   /78 (BP Location: Right arm, Patient Position: Chair, Cuff Size: Adult Regular)   Pulse 65   Temp 97.9  F (36.6  C) (Oral)   Ht 1.88 m (6' 2\")   Wt 93.9 kg (207 lb)   SpO2 97%   BMI 26.58 kg/m      Physical Exam  GENERAL: healthy, alert and no distress  EYES: Eyes grossly " normal to inspection, PERRL and conjunctivae and sclerae normal  HENT: ear canals and TM's normal, nose and mouth without ulcers or lesions  NECK: no adenopathy, no asymmetry, masses, or scars and thyroid normal to palpation  RESP: lungs clear to auscultation - no rales, rhonchi or wheezes  CV: regular rate and rhythm, normal S1 S2, no S3 or S4, no murmur, click or rub, no peripheral edema and peripheral pulses strong  ABDOMEN: soft, nontender, no hepatosplenomegaly, no masses   MS: no gross musculoskeletal defects noted, no edema  SKIN: He has multiple nevi on his trunk.  I do not see anything that looks especially suspicious for skin cancer.  NEURO: Normal strength and tone, mentation intact and speech normal  PSYCH: mentation appears normal, affect normal/bright    Diagnostic Test Results:  Labs reviewed in Epic    ASSESSMENT/PLAN:       ICD-10-CM    1. Routine general medical examination at a health care facility Z00.00    2. Gastroesophageal reflux disease, esophagitis presence not specified K21.9    3. Multiple benign nevi D22.9    4. Overweight (BMI 25.0-29.9) E66.3    5. Vitamin D deficiency E55.9    6. Other fatigue R53.83      Blood pressure and other vital signs look good  We discussed the above items  He will continue with ranitidine for his GERD  Continued observation and surveillance for his multiple nevi  Continue diet and exercise treatment for weight loss  I encouraged him to go back on a vitamin D3 supplement of 2000 international units daily  I think his fatigue is primarily related to lack of sleep and we discussed some sleep hygiene measures and trying to adjust his schedule to have him get closer to 7 hours or so of sleep at night    Plan a tentative recheck in 1 year, or sooner prn    COUNSELING:   Reviewed preventive health counseling, as reflected in patient instructions       Regular exercise       Healthy diet/nutrition    Estimated body mass index is 26.51 kg/m  as calculated from the  "following:    Height as of 3/22/19: 1.859 m (6' 1.19\").    Weight as of 6/4/19: 91.6 kg (202 lb).     Weight management plan: Discussed healthy diet and exercise guidelines     reports that he quit smoking about 12 years ago. He quit smokeless tobacco use about 10 months ago. His smokeless tobacco use included chew.      Counseling Resources:  ATP IV Guidelines  Pooled Cohorts Equation Calculator  FRAX Risk Assessment  ICSI Preventive Guidelines  Dietary Guidelines for Americans, 2010  USDA's MyPlate  ASA Prophylaxis  Lung CA Screening    Branden Esquivel MD  [facility]  "

## 2019-09-28 ENCOUNTER — HEALTH MAINTENANCE LETTER (OUTPATIENT)
Age: 49
End: 2019-09-28

## 2019-10-16 ENCOUNTER — OFFICE VISIT (OUTPATIENT)
Dept: FAMILY MEDICINE | Facility: CLINIC | Age: 49
End: 2019-10-16
Payer: COMMERCIAL

## 2019-10-16 VITALS
TEMPERATURE: 98.4 F | BODY MASS INDEX: 27.72 KG/M2 | DIASTOLIC BLOOD PRESSURE: 60 MMHG | WEIGHT: 216 LBS | RESPIRATION RATE: 18 BRPM | OXYGEN SATURATION: 99 % | SYSTOLIC BLOOD PRESSURE: 140 MMHG | HEART RATE: 79 BPM | HEIGHT: 74 IN
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
## 2019-10-16 DIAGNOSIS — S20.369A TICK BITE OF CHEST WALL, INITIAL ENCOUNTER: Primary | ICD-10-CM

## 2019-10-16 PROCEDURE — 99213 OFFICE O/P EST LOW 20 MIN: CPT | Performed by: PHYSICIAN ASSISTANT

## 2019-10-16 ASSESSMENT — MIFFLIN-ST. JEOR: SCORE: 1904.52

## 2019-10-16 NOTE — PROGRESS NOTES
"Subjective     Franklin Mejia is a 49 year old male who presents to clinic today for the following health issues:    HPI   Patient noted a wood tick bite under his L arm x 2 days.  Removed the tick but has noticed that there is an area of redness at the site.       Review of Systems   ROS COMP: Constitutional, HEENT, cardiovascular, pulmonary, gi and gu systems are negative, except as otherwise noted.      Objective    BP (!) 140/60   Pulse 79   Temp 98.4  F (36.9  C) (Oral)   Resp 18   Ht 1.88 m (6' 2\")   Wt 98 kg (216 lb)   SpO2 99%   BMI 27.73 kg/m    Body mass index is 27.73 kg/m .  Physical Exam   GENERAL: healthy, alert and no distress  SKIN: 0.5 cm circumferential macular erythema with central depression with retained tick head/mouthparts.  These were removed witht he cutting edge of an 18g after ETOH prep to good effect and no sequelae.  Site care reviewed.         Assessment & Plan     1. Tick bite of chest wall, initial encounter    Keep site clean and dry.  Follow up if symptoms should persist, change or worsen.  Patient amenable to this follow up plan. t     BMI:   Estimated body mass index is 27.73 kg/m  as calculated from the following:    Height as of this encounter: 1.88 m (6' 2\").    Weight as of this encounter: 98 kg (216 lb).               No follow-ups on file.    Carol Ochoa PA-C  [facility]    "

## 2019-10-17 ENCOUNTER — OFFICE VISIT (OUTPATIENT)
Dept: OPTOMETRY | Facility: CLINIC | Age: 49
End: 2019-10-17
Payer: COMMERCIAL

## 2019-10-17 ENCOUNTER — TELEPHONE (OUTPATIENT)
Dept: OPTOMETRY | Facility: CLINIC | Age: 49
End: 2019-10-17

## 2019-10-17 DIAGNOSIS — Z46.0 CONTACT LENS/GLASSES FITTING: ICD-10-CM

## 2019-10-17 DIAGNOSIS — H52.4 PRESBYOPIA: ICD-10-CM

## 2019-10-17 DIAGNOSIS — H52.13 MYOPIA OF BOTH EYES: ICD-10-CM

## 2019-10-17 DIAGNOSIS — H52.223 REGULAR ASTIGMATISM OF BOTH EYES: ICD-10-CM

## 2019-10-17 DIAGNOSIS — Z01.00 ENCOUNTER FOR EXAMINATION OF EYES AND VISION WITHOUT ABNORMAL FINDINGS: Primary | ICD-10-CM

## 2019-10-17 PROCEDURE — 92310 CONTACT LENS FITTING OU: CPT | Mod: GA | Performed by: OPTOMETRIST

## 2019-10-17 PROCEDURE — 92015 DETERMINE REFRACTIVE STATE: CPT | Performed by: OPTOMETRIST

## 2019-10-17 PROCEDURE — 92014 COMPRE OPH EXAM EST PT 1/>: CPT | Performed by: OPTOMETRIST

## 2019-10-17 ASSESSMENT — REFRACTION_CURRENTRX
OS_AXIS: 080
OS_DIAMETER: 14.5
OS_CYLINDER: -1.25
OD_DIAMETER: 14.5
OD_SPHERE: -7.00
OS_CYLINDER: -1.75
OD_SPHERE: -6.50
OD_BASECURVE: 8.50
OD_CYLINDER: -2.25
OS_SPHERE: -6.50
OS_BASECURVE: 8.50
OD_AXIS: 090
OS_SPHERE: -5.75
OS_AXIS: 080
OS_BASECURVE: 8.50
OD_CYLINDER: -2.25
OD_DIAMETER: 14.5
OD_AXIS: 090
OS_DIAMETER: 14.5
OD_BASECURVE: 8.50

## 2019-10-17 ASSESSMENT — VISUAL ACUITY
OD_CC: 20/60
OS_CC: 20/40
OS_CC: 20/120
CORRECTION_TYPE: CONTACTS
OD_CC: 20/40
METHOD: SNELLEN - LINEAR

## 2019-10-17 ASSESSMENT — REFRACTION_MANIFEST
OS_ADD: +1.75
OD_CYLINDER: +3.00
OD_AXIS: 003
OD_SPHERE: -10.25
OS_CYLINDER: +1.50
OS_AXIS: 170
OD_ADD: +1.75
OS_SPHERE: -8.25

## 2019-10-17 ASSESSMENT — CONF VISUAL FIELD
OD_NORMAL: 1
OS_NORMAL: 1

## 2019-10-17 ASSESSMENT — SLIT LAMP EXAM - LIDS
COMMENTS: NORMAL
COMMENTS: NORMAL

## 2019-10-17 ASSESSMENT — CUP TO DISC RATIO
OD_RATIO: 0.5
OS_RATIO: 0.5

## 2019-10-17 ASSESSMENT — EXTERNAL EXAM - RIGHT EYE: OD_EXAM: NORMAL

## 2019-10-17 ASSESSMENT — REFRACTION_WEARINGRX
OD_SPHERE: -9.25
OS_SPHERE: -8.50
SPECS_TYPE: SVL
OS_AXIS: 177
OD_CYLINDER: +3.00
OS_CYLINDER: +1.50
OD_AXIS: 006

## 2019-10-17 ASSESSMENT — EXTERNAL EXAM - LEFT EYE: OS_EXAM: NORMAL

## 2019-10-17 ASSESSMENT — TONOMETRY
IOP_METHOD: APPLANATION
OD_IOP_MMHG: 20
OS_IOP_MMHG: 20

## 2019-10-17 NOTE — PATIENT INSTRUCTIONS
Franklin was advised of today's exam findings.  Fill glasses prescription  Allow 2 weeks to adapt to change in glasses  Wear glasses full time  Copy of glasses Rx provided today.    Will order updated contact lens prescription to trial.   When lenses arrive to our clinic, we will contact you and at that point you can pick the lenses up from the optical shop without an appointment.   Trial the lenses for at least 1 week. If adequate comfort/vision with contact lenses, we can finalize the prescription. If not, notify me and we will have to order in different trials.     Return in 1 year for eye exam, or sooner if needed.    The effects of the dilating drops last for 4- 6 hours.  You will be more sensitive to light and vision will be blurry up close.  Mydriatic sunglasses were given if needed.      Haris Campbell O.D.  [address]  [place]  [address]    [phone]

## 2019-10-17 NOTE — TELEPHONE ENCOUNTER
10/17/2019      Order trials. Patient can  the trials in the [facility].    Right [employer] 8.50 14.5 -7.00 -2.25 090 [employer]     Left [employer] 8.50 14.5 -5.75 -1.75 080 [employer]       [employer]    10/23/2019    Left lens came in. Right lens on back order.    [employer]    10/30/2019    Right lens in. Patient can  in Optical store.    [employer]    11/1/2019    Patient picked up trials in the [facility] Store.    [employer]

## 2019-10-17 NOTE — PROGRESS NOTES
Chief Complaint   Patient presents with     Annual Eye Exam     Accompanied by self  Previous contact lens wearer? Yes:   Comfort of contact lenses : Good  Satisfied with current lenses: No, vision has decreased        Last Eye Exam: 1 years ago  Dilated Previously: Yes    What are you currently using to see?  glasses and contacts-patient wears reader (+2.00) over contacts    Distance Vision Acuity: Noticed gradual change in both eyes    Near Vision Acuity: Not satisfied     Eye Comfort: good  Do you use eye drops? : No  Occupation or Hobbies: oneliawilfredo Whitten, Optometric Tech     Medical, surgical and family histories reviewed and updated 10/17/2019.       OBJECTIVE: See Ophthalmology exam    ASSESSMENT:    ICD-10-CM    1. Encounter for examination of eyes and vision without abnormal findings Z01.00    2. Myopia of both eyes H52.13    3. Regular astigmatism of both eyes H52.223    4. Presbyopia H52.4    5. Contact lens/glasses fitting Z46.0       PLAN:     Patient Instructions   Franklin was advised of today's exam findings.  Fill glasses prescription  Allow 2 weeks to adapt to change in glasses  Wear glasses full time  Copy of glasses Rx provided today.    Will order updated contact lens prescription to trial.   When lenses arrive to our clinic, we will contact you and at that point you can pick the lenses up from the optical shop without an appointment.   Trial the lenses for at least 1 week. If adequate comfort/vision with contact lenses, we can finalize the prescription. If not, notify me and we will have to order in different trials.     Return in 1 year for eye exam, or sooner if needed.    The effects of the dilating drops last for 4- 6 hours.  You will be more sensitive to light and vision will be blurry up close.  Mydriatic sunglasses were given if needed.      Haris Campbell O.D.  [facility]  [address] [place] [address]. [place]  [address]    [phone]

## 2019-10-17 NOTE — LETTER
10/17/2019         RE: Franklin Mejia  [address]  [place] [address]        Dear Colleague,    Thank you for referring your patient, Franklin Mejia, to the [facility]. Please see a copy of my visit note below.    Chief Complaint   Patient presents with     Annual Eye Exam     Accompanied by self  Previous contact lens wearer? Yes:   Comfort of contact lenses : Good  Satisfied with current lenses: No, vision has decreased        Last Eye Exam: 1 years ago  Dilated Previously: Yes    What are you currently using to see?  glasses and contacts-patient wears reader (+2.00) over contacts    Distance Vision Acuity: Noticed gradual change in both eyes    Near Vision Acuity: Not satisfied     Eye Comfort: good  Do you use eye drops? : No  Occupation or Hobbies: oneliaehwilfredo Whitten, Optometric Tech     Medical, surgical and family histories reviewed and updated 10/17/2019.       OBJECTIVE: See Ophthalmology exam    ASSESSMENT:    ICD-10-CM    1. Encounter for examination of eyes and vision without abnormal findings Z01.00    2. Myopia of both eyes H52.13    3. Regular astigmatism of both eyes H52.223    4. Presbyopia H52.4    5. Contact lens/glasses fitting Z46.0       PLAN:     Patient Instructions   Franklin was advised of today's exam findings.  Fill glasses prescription  Allow 2 weeks to adapt to change in glasses  Wear glasses full time  Copy of glasses Rx provided today.    Will order updated contact lens prescription to trial.   When lenses arrive to our clinic, we will contact you and at that point you can pick the lenses up from the optical shop without an appointment.   Trial the lenses for at least 1 week. If adequate comfort/vision with contact lenses, we can finalize the prescription. If not, notify me and we will have to order in different trials.     Return in 1 year for eye exam, or sooner if needed.    The effects of the dilating drops last for 4- 6  hours.  You will be more sensitive to light and vision will be blurry up close.  Mydriatic sunglasses were given if needed.      Haris Campbell O.D.  [address]  [place]  [address]    [phone]                         Again, thank you for allowing me to participate in the care of your patient.        Sincerely,        Haris Campbell, OD

## 2019-12-19 ENCOUNTER — TELEPHONE (OUTPATIENT)
Dept: OPTOMETRY | Facility: CLINIC | Age: 49
End: 2019-12-19

## 2019-12-19 NOTE — TELEPHONE ENCOUNTER
12/19/2019    Order trials for patient. Call patient for a dispense appointment when the lens come in.    B&L Larss Toric  8.5 14.5    right eye -6.50 -2.25 x 090    Left eye -6.00 -1.25 x 080    [employer]    12/30/2019    Left message for patient to call and set up dispense appointment.    [employer]    12/30/2019    Patient has dispense appointment on 12- at 3pm    [employer]

## 2019-12-30 ENCOUNTER — OFFICE VISIT (OUTPATIENT)
Dept: OPTOMETRY | Facility: CLINIC | Age: 49
End: 2019-12-30
Payer: COMMERCIAL

## 2019-12-30 DIAGNOSIS — Z46.0 CONTACT LENS/GLASSES FITTING: Primary | ICD-10-CM

## 2019-12-30 DIAGNOSIS — H52.13 MYOPIA OF BOTH EYES: ICD-10-CM

## 2019-12-30 DIAGNOSIS — H52.223 REGULAR ASTIGMATISM OF BOTH EYES: ICD-10-CM

## 2019-12-30 PROCEDURE — 99207 ZZC NO CHARGE LOS: CPT | Performed by: OPTOMETRIST

## 2019-12-30 ASSESSMENT — REFRACTION_CURRENTRX
OD_CYLINDER: -2.25
OD_BASECURVE: 8.50
OS_BASECURVE: 8.50
OS_CYLINDER: -1.25
OD_AXIS: 090
OS_DIAMETER: 14.5
OS_AXIS: 080
OD_SPHERE: -6.50
OD_DIAMETER: 14.5
OS_SPHERE: -6.00

## 2019-12-30 ASSESSMENT — EXTERNAL EXAM - RIGHT EYE: OD_EXAM: NORMAL

## 2019-12-30 ASSESSMENT — VISUAL ACUITY
CORRECTION_TYPE: CONTACTS
OD_CC: 20/50
METHOD: SNELLEN - LINEAR
OS_CC: 20/30

## 2019-12-30 ASSESSMENT — SLIT LAMP EXAM - LIDS
COMMENTS: NORMAL
COMMENTS: NORMAL

## 2019-12-30 ASSESSMENT — EXTERNAL EXAM - LEFT EYE: OS_EXAM: NORMAL

## 2019-12-30 NOTE — PATIENT INSTRUCTIONS
New trial of Soflens Toric contact lenses.   If adequate comfort/vision with contact lenses, we can finalize the prescription. If not, return for contact lens follow-up appointment.     Haris Campbell O.D.  [address] [place]  [address]    [phone]

## 2019-12-30 NOTE — PROGRESS NOTES
Chief Complaint   Patient presents with     Contact Lens Follow Up       Contact lenses dispensed    Leann Whitten, Optometric Tech       OBJECTIVE: See Ophthalmology exam     ASSESSMENT:    ICD-10-CM    1. Contact lens/glasses fitting Z46.0    2. Myopia of both eyes H52.13    3. Regular astigmatism of both eyes H52.223       PLAN:  Patient Instructions   New trial of Soflens Toric contact lenses.   If adequate comfort/vision with contact lenses, we can finalize the prescription. If not, return for contact lens follow-up appointment.     Haris Campbell O.D.  [address]  [address]    [phone]

## 2020-01-27 ENCOUNTER — TELEPHONE (OUTPATIENT)
Dept: OPTOMETRY | Facility: CLINIC | Age: 50
End: 2020-01-27

## 2020-01-27 NOTE — TELEPHONE ENCOUNTER
1/27/2020    Patient is happy with new contact lens trials. Please finalized the prescription.    Leann Whitten    Optometry Tech

## 2020-02-12 ENCOUNTER — NURSE TRIAGE (OUTPATIENT)
Dept: FAMILY MEDICINE | Facility: CLINIC | Age: 50
End: 2020-02-12

## 2020-02-12 NOTE — TELEPHONE ENCOUNTER
He has no known history of heart disease, and this sounds muscular.  If discomfort can be made better or worse by rubbing on the chest wall, then it would not be cardiac.  Based on his history below and the lack of significant cardiovascular disease risk factors, I would give him the verbal okay to participate in a CrossFit regimen.  If he were to have substernal chest discomfort brought on by exertion and relieved by rest, that would be consistent with angina, then that should be further evaluated.

## 2020-02-12 NOTE — TELEPHONE ENCOUNTER
"Called transferred to RN for \"chest pain\" symptoms.     However, patient reports heartburn symptoms. States has been chronic, ongoing for years. Has not been taking Zantac as prescribed.     He denies any SOB, radiating/new chest pain.     States he has some discomfort at times in left breast/muscle area, intermittent. Will go away with massage. Has been ongoing x 3 months. He feels that this is more muscle related. However, notes that wife has recent breast cancer diagnosis and is wondering if he should be concerned about something more serious as well.     When he mentioned above symptoms to new crossfit instructor, he was advised to be \"cleared\" verbally by PCP to participate in crossfit regimen.     Patient last seen 7/2019.     Routed to PCP to review and advise.     Cynthia Amanda, RN, BSN, PHN  [facility]: [place]            "

## 2020-07-31 ENCOUNTER — OFFICE VISIT (OUTPATIENT)
Dept: FAMILY MEDICINE | Facility: CLINIC | Age: 50
End: 2020-07-31
Payer: COMMERCIAL

## 2020-07-31 VITALS
HEIGHT: 74 IN | TEMPERATURE: 97.9 F | SYSTOLIC BLOOD PRESSURE: 120 MMHG | RESPIRATION RATE: 16 BRPM | HEART RATE: 89 BPM | BODY MASS INDEX: 27.13 KG/M2 | DIASTOLIC BLOOD PRESSURE: 62 MMHG | WEIGHT: 211.4 LBS | OXYGEN SATURATION: 98 %

## 2020-07-31 DIAGNOSIS — S39.012A BACK STRAIN, INITIAL ENCOUNTER: Primary | ICD-10-CM

## 2020-07-31 PROCEDURE — 99213 OFFICE O/P EST LOW 20 MIN: CPT | Performed by: NURSE PRACTITIONER

## 2020-07-31 ASSESSMENT — MIFFLIN-ST. JEOR: SCORE: 1878.65

## 2020-07-31 NOTE — PATIENT INSTRUCTIONS
Patient Education     Back Sprain or Strain    Injury to the muscles (strain) or ligaments (sprain) around the spine can be troubling. Injury may occur after a sudden forceful twisting or bending such as in a car accident, after a simple awkward movement, or after lifting something heavy with poor body positioning. In any case, muscle spasm is often present and adds to the pain.  Thankfully, most people feel better in 1 to 2 weeks. Most of the rest feel better in 1 to 2 months. Most people can remain active. Unless you had a forceful or traumatic physical injury such as a car accident or fall, X-rays may not be done for the first assessment of a back sprain or strain. If pain continues and doesn't respond to medical treatment, your healthcare provider may then do X-rays and other tests.  Home care  These guidelines will help you care for your injury at home:    When in bed, try to find a comfortable position. A firm mattress is best. Try lying flat on your back with pillows under your knees. You can also try lying on your side with your knees bent up toward your chest and a pillow between your knees.    Don't sit for long periods. Try not to take long car rides or take other trips that have you sitting for a long time. This puts more stress on the lower back than standing or walking.    During the first 24 to 72 hours after an injury or flare-up, put an ice pack on the painful area for 20 minutes. Then remove it for 20 minutes. Do this for 60 to 90 minutes, or several times a day. This will reduce swelling and pain. Always wrap the ice pack in a thin towel or plastic to protect your skin.    You can start with ice, then switch to heat. Heat from a hot shower, hot bath, or heating pad reduces pain and works well for muscle spasms. Put heat on the painful area for 20 minutes, then remove for 20 minutes. Do this for 60 to 90 minutes, or several times a day. Don't use a heating pad while sleeping. It can burn the  skin.    You can alternate the ice and heat. Talk with your healthcare provider to find out the best treatment or therapy for your back pain.    Therapeutic massage can help relax the back muscles without stretching them.    Be aware of safe lifting methods. Don't lift anything over 15 pounds until all of the pain is gone.  Medicines  Talk with your healthcare provider before using medicines, especially if you have other health problems or are taking other medicines.    You may use over-the-counter medicines such as acetaminophen, ibuprofen, or naproxen to control pain, unless another pain medicine was prescribed. Talk with your healthcare provider before taking any medicines if you have a chronic condition such as diabetes, liver or kidney disease, stomach ulcers, or digestive bleeding, or are taking blood-thinner medicines.    Be careful if you are given prescription medicines, opioids, or medicine for muscle spasm. They can cause drowsiness, and affect your coordination, reflexes, and judgment. Don't drive or operate heavy machinery when taking these types of medicines. Only take pain medicine as prescribed by your healthcare provider.  Follow-up care  Follow up with your healthcare provider, or as advised. You may need physical therapy or more tests if your symptoms get worse.  If you had X-rays, your healthcare provider may be checking for any broken bones, breaks, or fractures. Bruises and sprains can sometimes hurt as much as a fracture. These injuries can take time to heal fully. If your symptoms don t get better or they get worse, talk with your healthcare provider. You may need a repeat X-ray or other tests.  Call 911  Call 911 if any of the following occur:    Trouble breathing    Confused    Very drowsy or trouble awakening    Fainting or loss of consciousness    Rapid or very slow heart rate    Loss of bowel or bladder control  When to seek medical advice  Call your healthcare provider right away if any  of the following occur:    Pain gets worse or spreads to your arms or legs    Weakness or numbness in one or both arms or legs    Numbness in the groin or genital area  Date Last Reviewed: 6/1/2016 2000-2019 [employer]. [address] [place] [address]. All rights reserved. This information is not intended as a substitute for professional medical care. Always follow your healthcare professional's instructions.

## 2020-07-31 NOTE — PROGRESS NOTES
Subjective     Franklin Mejia is a 50 year old male who presents to clinic today for the following health issues:    HPI       Back Pain       Duration: 1 week         Specific cause: lifting, turning/bending    Description:   Location of pain: low back right  Character of pain: sharp, dull ache and stabbing  Pain radiation: around to right side   New numbness or weakness in legs, not attributed to pain:  no     Intensity: Currently 4/10, At its worst 10/10-takes breath away     History:   Pain interferes with job: currently on vacation   History of back problems: no prior back problems  Any previous MRI or X-rays: None  Sees a specialist for back pain:  No  Therapies tried without relief: cold    Alleviating factors:   Improved by: none      Precipitating factors:  Worsened by: Lifting, Bending and getting up from a sitting position        Accompanying Signs & Symptoms:  Risk of Fracture:  None  Risk of Cauda Equina:  None  Risk of Infection:  None  Risk of Cancer:  None  Risk of Ankylosing Spondylitis:  Onset at age <35, male, AND morning back stiffness. no     Patient Active Problem List   Diagnosis     Gastroesophageal reflux disease, esophagitis presence not specified     Overweight (BMI 25.0-29.9)     Multiple benign nevi     Vitamin D deficiency     Past Surgical History:   Procedure Laterality Date     APPENDECTOMY         Social History     Tobacco Use     Smoking status: Former Smoker     Last attempt to quit: 2006     Years since quittin.9     Smokeless tobacco: Former User     Types: Chew     Quit date: 2018   Substance Use Topics     Alcohol use: Yes     Alcohol/week: 0.0 standard drinks     Comment: occasional      Family History   Problem Relation Age of Onset     Lipids Mother      Hypertension Father          copd 81     Macular Degeneration Father      Diabetes Father      Diabetes Brother      Cerebrovascular Disease No family hx of      Thyroid Disease No family hx of  "     Glaucoma No family hx of      Cancer No family hx of            Reviewed and updated as needed this visit by Provider         Review of Systems   Constitutional, HEENT, cardiovascular, pulmonary, gi and gu systems are negative, except as otherwise noted.      Objective    /62 (BP Location: Right arm, Patient Position: Sitting, Cuff Size: Adult Regular)   Pulse 89   Temp 97.9  F (36.6  C) (Tympanic)   Resp 16   Ht 1.88 m (6' 2\")   Wt 95.9 kg (211 lb 6.4 oz)   SpO2 98%   BMI 27.14 kg/m    Body mass index is 27.14 kg/m .  Physical Exam   GENERAL: healthy, alert and no distress  EYES: Eyes grossly normal to inspection, PERRL and conjunctivae and sclerae normal  RESP: lungs clear to auscultation - no rales, rhonchi or wheezes  CV: regular rate and rhythm, normal S1 S2, no S3 or S4, no murmur, click or rub, no peripheral edema and peripheral pulses strong  SKIN: no suspicious lesions or rashes  NEURO: Normal strength and tone, mentation intact and speech normal  Comprehensive back pain exam:  Tenderness of right side para thoracic spine muscle tenderness., Range of motion not limited by pain, Lower extremity strength functional and equal on both sides and Straight leg raise negative bilaterally  PSYCH: mentation appears normal, affect normal/bright    Diagnostic Test Results:  none         Assessment & Plan       ICD-10-CM    1. Back strain, initial encounter  S39.012A tiZANidine (ZANAFLEX) 4 MG tablet        BMI:   Estimated body mass index is 27.14 kg/m  as calculated from the following:    Height as of this encounter: 1.88 m (6' 2\").    Weight as of this encounter: 95.9 kg (211 lb 6.4 oz).   Weight management plan: Patient was referred to their PCP to discuss a diet and exercise plan.        FUTURE APPOINTMENTS:       - Follow-up visit in 2-3 weeks for persistent symptoms, sooner PRN new or worsening symptoms.     Patient Instructions     Patient Education     Back Sprain or Strain    Injury to the " muscles (strain) or ligaments (sprain) around the spine can be troubling. Injury may occur after a sudden forceful twisting or bending such as in a car accident, after a simple awkward movement, or after lifting something heavy with poor body positioning. In any case, muscle spasm is often present and adds to the pain.  Thankfully, most people feel better in 1 to 2 weeks. Most of the rest feel better in 1 to 2 months. Most people can remain active. Unless you had a forceful or traumatic physical injury such as a car accident or fall, X-rays may not be done for the first assessment of a back sprain or strain. If pain continues and doesn't respond to medical treatment, your healthcare provider may then do X-rays and other tests.  Home care  These guidelines will help you care for your injury at home:    When in bed, try to find a comfortable position. A firm mattress is best. Try lying flat on your back with pillows under your knees. You can also try lying on your side with your knees bent up toward your chest and a pillow between your knees.    Don't sit for long periods. Try not to take long car rides or take other trips that have you sitting for a long time. This puts more stress on the lower back than standing or walking.    During the first 24 to 72 hours after an injury or flare-up, put an ice pack on the painful area for 20 minutes. Then remove it for 20 minutes. Do this for 60 to 90 minutes, or several times a day. This will reduce swelling and pain. Always wrap the ice pack in a thin towel or plastic to protect your skin.    You can start with ice, then switch to heat. Heat from a hot shower, hot bath, or heating pad reduces pain and works well for muscle spasms. Put heat on the painful area for 20 minutes, then remove for 20 minutes. Do this for 60 to 90 minutes, or several times a day. Don't use a heating pad while sleeping. It can burn the skin.    You can alternate the ice and heat. Talk with your  healthcare provider to find out the best treatment or therapy for your back pain.    Therapeutic massage can help relax the back muscles without stretching them.    Be aware of safe lifting methods. Don't lift anything over 15 pounds until all of the pain is gone.  Medicines  Talk with your healthcare provider before using medicines, especially if you have other health problems or are taking other medicines.    You may use over-the-counter medicines such as acetaminophen, ibuprofen, or naproxen to control pain, unless another pain medicine was prescribed. Talk with your healthcare provider before taking any medicines if you have a chronic condition such as diabetes, liver or kidney disease, stomach ulcers, or digestive bleeding, or are taking blood-thinner medicines.    Be careful if you are given prescription medicines, opioids, or medicine for muscle spasm. They can cause drowsiness, and affect your coordination, reflexes, and judgment. Don't drive or operate heavy machinery when taking these types of medicines. Only take pain medicine as prescribed by your healthcare provider.  Follow-up care  Follow up with your healthcare provider, or as advised. You may need physical therapy or more tests if your symptoms get worse.  If you had X-rays, your healthcare provider may be checking for any broken bones, breaks, or fractures. Bruises and sprains can sometimes hurt as much as a fracture. These injuries can take time to heal fully. If your symptoms don t get better or they get worse, talk with your healthcare provider. You may need a repeat X-ray or other tests.  Call 911  Call 911 if any of the following occur:    Trouble breathing    Confused    Very drowsy or trouble awakening    Fainting or loss of consciousness    Rapid or very slow heart rate    Loss of bowel or bladder control  When to seek medical advice  Call your healthcare provider right away if any of the following occur:    Pain gets worse or spreads to  your arms or legs    Weakness or numbness in one or both arms or legs    Numbness in the groin or genital area  Date Last Reviewed: 6/1/2016 2000-2019 [employer]. [address] [place] [address], [place] [address]. All rights reserved. This information is not intended as a substitute for professional medical care. Always follow your healthcare professional's instructions.               No follow-ups on file.    JAIME Naqvi [facility]

## 2020-10-14 NOTE — PROGRESS NOTES
Progress Note - Initial Session    Client Name:  Franklin Mejia Date: 2-14-17         Service Type: Individual      Session Start Time: 11am  Session End Time: 12pm      Session Length: 53 - 60      Session #: 1     Attendees: Client attended alone         Diagnostic Assessment in progress.  Unable to complete documentation at the conclusion of the first session due to lack of paperwork time later in the day.        Mental Status Assessment:  Appearance:   Appropriate   Eye Contact:   Good   Psychomotor Behavior: Normal   Attitude:   Cooperative   Orientation:   All  Speech   Rate / Production: Normal    Volume:  Normal   Mood:    Anxious  Elevated   Affect:    Worrisome   Thought Content:  Clear   Thought Form:  Coherent  Logical   Insight:    Good       Safety Issues and Plan for Safety and Risk Management:  Client denies current fears or concerns for personal safety.  Client denies current or recent suicidal ideation or behaviors.  Client denies current or recent homicidal ideation or behaviors.  Client denies current or recent self injurious behavior or ideation.  Client denies other safety concerns.  A safety and risk management plan has not been developed at this time, however client was given the after-hours number / 911 should there be a change in any of these risk factors.  Client reports there are firearms in the house. The firearms are secured in a locked space.      Diagnostic Criteria:  A. Excessive anxiety and worry about a number of events or activities (such as work or school performance).   B. The person finds it difficult to control the worry.  C. Select 3 or more symptoms (required for diagnosis). Only one item is required in children.   - Restlessness or feeling keyed up or on edge.    - Being easily fatigued.    - Difficulty concentrating or mind going blank.    - Irritability.    - Muscle tension.    - Sleep disturbance (difficulty falling or staying asleep, or restless  unsatisfying sleep).   D. The focus of the anxiety and worry is not confined to features of an Axis I disorder.  E. The anxiety, worry, or physical symptoms cause clinically significant distress or impairment in social, occupational, or other important areas of functioning.   F. The disturbance is not due to the direct physiological effects of a substance (e.g., a drug of abuse, a medication) or a general medical condition (e.g., hyperthyroidism) and does not occur exclusively during a Mood Disorder, a Psychotic Disorder, or a Pervasive Developmental Disorder.    - The aformentioned symptoms began - year(s) ago and occurs 5 days per week and is experienced as moderate.  (1) Inattention: 6 or more of the following symptoms have persisted for at least 6 months to a degree that is inconsistent with developmental level and that negatively impacts directly on social and academic/occupational activities:  - Often fails to give close attention to details or makes careless mistakes in schoolwork, at work, or during other activities  - Often has difficulty sustaining attention in tasks or play activities  - Often does not follow through on instructions and fails to finish schoolwork, chores, or duties in the workplace  - Often has difficulty organizing tasks and activities  - Often avoids, dislikes, or is reluctant to engage in tasks that require sustained mental effort  - Often loses things necessary for tasks or activities  - Is often easily distractedby extraneous stimuli  - Is often forgetful in daily activities        DSM5 Diagnoses: (Sustained by DSM5 Criteria Listed Above)  Diagnoses: Attention-Deficit/Hyperactivity Disorder  314.00 (F90.0) Predominantly inattentive presentation  300.02 (F41.1) Generalized Anxiety Disorder  Psychosocial & Contextual Factors: Some relational stress and work stress  WHODAS 2.0 (12 item)            This questionnaire asks about difficulties due to health conditions. Health conditions   include  disease or illnesses, other health problems that may be short or long lasting,  injuries, mental health or emotional problems, and problems with alcohol or drugs.                     Think back over the past 30 days and answer these questions, thinking about how much  difficulty you had doing the following activities. For each question, please [place] only  one response.    S1 Standing for long periods such as 30 minutes? None =         1   S2 Taking care of household responsibilities? None =         1   S3 Learning a new task, for example, learning how to get to a new place? None =         1   S4 How much of a problem do you have joining community activities (for example, festivals, Evangelical or other activities) in the same way as anyone else can? None =         1   S5 How much have you been emotionally affected by your health problems? None =         1     In the past 30 days, how much difficulty did you have in:   S6 Concentrating on doing something for ten minutes? Moderate =   3   S7 Walking a long distance such as a kilometer (or equivalent)? None =         1   S8 Washing your whole body? None =         1   S9 Getting dressed? None =         1   S10 Dealing with people you do not know? None =         1   S11 Maintaining a friendship? None =         1   S12 Your day to day work? None =         1     H1 Overall, in the past 30 days, how many days were these difficulties present? Record number of days 0   H2 In the past 30 days, for how many days were you totally unable to carry out your usual activities or work because of any health condition? Record number of days  0   H3 In the past 30 days, not counting the days that you were totally unable, for how many days did you cut back or reduce your usual activities or work because of any health condition? Record number of days 0       Collateral Reports Completed:  Routed note to PCP      PLAN: (Homework, other):  Client stated that he may follow up for  ongoing services with [facility].  Client has a follow up appointment in two weeks.        Cherri Wooten, TH       14-Oct-2020 20:27

## 2020-10-15 ENCOUNTER — HOSPITAL ENCOUNTER (EMERGENCY)
Facility: CLINIC | Age: 50
Discharge: HOME | End: 2020-10-15
Attending: NURSE PRACTITIONER | Admitting: NURSE PRACTITIONER
Payer: COMMERCIAL

## 2020-10-15 ENCOUNTER — APPOINTMENT (OUTPATIENT)
Dept: CT IMAGING | Facility: CLINIC | Age: 50
End: 2020-10-15
Attending: NURSE PRACTITIONER
Payer: COMMERCIAL

## 2020-10-15 VITALS
BODY MASS INDEX: 25.94 KG/M2 | WEIGHT: 202 LBS | DIASTOLIC BLOOD PRESSURE: 82 MMHG | SYSTOLIC BLOOD PRESSURE: 150 MMHG | HEART RATE: 80 BPM | OXYGEN SATURATION: 99 % | RESPIRATION RATE: 16 BRPM | TEMPERATURE: 98 F

## 2020-10-15 DIAGNOSIS — N20.1 URETERAL STONE: ICD-10-CM

## 2020-10-15 PROBLEM — N20.0 NEPHROLITHIASIS: Status: ACTIVE | Noted: 2020-01-01

## 2020-10-15 LAB
ALBUMIN SERPL-MCNC: 4.2 G/DL (ref 3.4–5)
ALBUMIN UR-MCNC: 30 MG/DL
ALP SERPL-CCNC: 74 U/L (ref 40–150)
ALT SERPL W P-5'-P-CCNC: 38 U/L (ref 0–70)
ANION GAP SERPL CALCULATED.3IONS-SCNC: 2 MMOL/L (ref 3–14)
APPEARANCE UR: ABNORMAL
AST SERPL W P-5'-P-CCNC: 23 U/L (ref 0–45)
BASOPHILS # BLD AUTO: 0.1 10E9/L (ref 0–0.2)
BASOPHILS NFR BLD AUTO: 0.8 %
BILIRUB SERPL-MCNC: 0.6 MG/DL (ref 0.2–1.3)
BILIRUB UR QL STRIP: NEGATIVE
BUN SERPL-MCNC: 16 MG/DL (ref 7–30)
CALCIUM SERPL-MCNC: 10.8 MG/DL (ref 8.5–10.1)
CHLORIDE SERPL-SCNC: 110 MMOL/L (ref 94–109)
CO2 SERPL-SCNC: 29 MMOL/L (ref 20–32)
COLOR UR AUTO: YELLOW
CREAT SERPL-MCNC: 1 MG/DL (ref 0.66–1.25)
DIFFERENTIAL METHOD BLD: NORMAL
EOSINOPHIL # BLD AUTO: 0.2 10E9/L (ref 0–0.7)
EOSINOPHIL NFR BLD AUTO: 1.9 %
ERYTHROCYTE [DISTWIDTH] IN BLOOD BY AUTOMATED COUNT: 13.9 % (ref 10–15)
GFR SERPL CREATININE-BSD FRML MDRD: 86 ML/MIN/{1.73_M2}
GLUCOSE SERPL-MCNC: 110 MG/DL (ref 70–99)
GLUCOSE UR STRIP-MCNC: NEGATIVE MG/DL
HCT VFR BLD AUTO: 46 % (ref 40–53)
HGB BLD-MCNC: 14.7 G/DL (ref 13.3–17.7)
HGB UR QL STRIP: ABNORMAL
IMM GRANULOCYTES # BLD: 0 10E9/L (ref 0–0.4)
IMM GRANULOCYTES NFR BLD: 0.4 %
KETONES UR STRIP-MCNC: NEGATIVE MG/DL
LEUKOCYTE ESTERASE UR QL STRIP: NEGATIVE
LIPASE SERPL-CCNC: 71 U/L (ref 73–393)
LYMPHOCYTES # BLD AUTO: 1.4 10E9/L (ref 0.8–5.3)
LYMPHOCYTES NFR BLD AUTO: 18.3 %
MCH RBC QN AUTO: 28.1 PG (ref 26.5–33)
MCHC RBC AUTO-ENTMCNC: 32 G/DL (ref 31.5–36.5)
MCV RBC AUTO: 88 FL (ref 78–100)
MONOCYTES # BLD AUTO: 0.4 10E9/L (ref 0–1.3)
MONOCYTES NFR BLD AUTO: 4.7 %
NEUTROPHILS # BLD AUTO: 5.8 10E9/L (ref 1.6–8.3)
NEUTROPHILS NFR BLD AUTO: 73.9 %
NITRATE UR QL: NEGATIVE
NRBC # BLD AUTO: 0 10*3/UL
NRBC BLD AUTO-RTO: 0 /100
PH UR STRIP: 6 PH (ref 5–7)
PLATELET # BLD AUTO: 215 10E9/L (ref 150–450)
POTASSIUM SERPL-SCNC: 4.1 MMOL/L (ref 3.4–5.3)
PROT SERPL-MCNC: 7.3 G/DL (ref 6.8–8.8)
RBC # BLD AUTO: 5.24 10E12/L (ref 4.4–5.9)
RBC #/AREA URNS AUTO: >182 /HPF (ref 0–2)
SODIUM SERPL-SCNC: 141 MMOL/L (ref 133–144)
SOURCE: ABNORMAL
SP GR UR STRIP: 1.02 (ref 1–1.03)
SQUAMOUS #/AREA URNS AUTO: 1 /HPF (ref 0–1)
UROBILINOGEN UR STRIP-MCNC: 0 MG/DL (ref 0–2)
WBC # BLD AUTO: 7.9 10E9/L (ref 4–11)
WBC #/AREA URNS AUTO: 5 /HPF (ref 0–5)
YEAST #/AREA URNS HPF: ABNORMAL /HPF

## 2020-10-15 PROCEDURE — 99284 EMERGENCY DEPT VISIT MOD MDM: CPT | Performed by: NURSE PRACTITIONER

## 2020-10-15 PROCEDURE — 99285 EMERGENCY DEPT VISIT HI MDM: CPT | Mod: 25 | Performed by: NURSE PRACTITIONER

## 2020-10-15 PROCEDURE — 258N000003 HC RX IP 258 OP 636: Performed by: NURSE PRACTITIONER

## 2020-10-15 PROCEDURE — 96374 THER/PROPH/DIAG INJ IV PUSH: CPT | Mod: 59 | Performed by: NURSE PRACTITIONER

## 2020-10-15 PROCEDURE — 250N000011 HC RX IP 250 OP 636: Performed by: NURSE PRACTITIONER

## 2020-10-15 PROCEDURE — 83690 ASSAY OF LIPASE: CPT | Performed by: NURSE PRACTITIONER

## 2020-10-15 PROCEDURE — 250N000009 HC RX 250: Performed by: NURSE PRACTITIONER

## 2020-10-15 PROCEDURE — 80053 COMPREHEN METABOLIC PANEL: CPT | Performed by: NURSE PRACTITIONER

## 2020-10-15 PROCEDURE — 96375 TX/PRO/DX INJ NEW DRUG ADDON: CPT | Performed by: NURSE PRACTITIONER

## 2020-10-15 PROCEDURE — 85025 COMPLETE CBC W/AUTO DIFF WBC: CPT | Performed by: NURSE PRACTITIONER

## 2020-10-15 PROCEDURE — 81001 URINALYSIS AUTO W/SCOPE: CPT | Performed by: NURSE PRACTITIONER

## 2020-10-15 PROCEDURE — 96361 HYDRATE IV INFUSION ADD-ON: CPT | Performed by: NURSE PRACTITIONER

## 2020-10-15 PROCEDURE — 74177 CT ABD & PELVIS W/CONTRAST: CPT

## 2020-10-15 RX ORDER — IOPAMIDOL 755 MG/ML
98 INJECTION, SOLUTION INTRAVASCULAR ONCE
Status: COMPLETED | OUTPATIENT
Start: 2020-10-15 | End: 2020-10-15

## 2020-10-15 RX ORDER — ONDANSETRON 2 MG/ML
4 INJECTION INTRAMUSCULAR; INTRAVENOUS EVERY 30 MIN PRN
Status: DISCONTINUED | OUTPATIENT
Start: 2020-10-15 | End: 2020-10-15 | Stop reason: HOSPADM

## 2020-10-15 RX ORDER — SODIUM CHLORIDE 9 MG/ML
INJECTION, SOLUTION INTRAVENOUS CONTINUOUS
Status: DISCONTINUED | OUTPATIENT
Start: 2020-10-15 | End: 2020-10-15 | Stop reason: HOSPADM

## 2020-10-15 RX ORDER — ONDANSETRON 4 MG/1
4 TABLET, ORALLY DISINTEGRATING ORAL EVERY 8 HOURS PRN
Qty: 10 TABLET | Refills: 0 | Status: SHIPPED | OUTPATIENT
Start: 2020-10-15 | End: 2020-10-18

## 2020-10-15 RX ORDER — KETOROLAC TROMETHAMINE 30 MG/ML
30 INJECTION, SOLUTION INTRAMUSCULAR; INTRAVENOUS ONCE
Status: COMPLETED | OUTPATIENT
Start: 2020-10-15 | End: 2020-10-15

## 2020-10-15 RX ORDER — OXYCODONE HYDROCHLORIDE 5 MG/1
5 TABLET ORAL EVERY 6 HOURS PRN
Qty: 12 TABLET | Refills: 0 | Status: SHIPPED | OUTPATIENT
Start: 2020-10-15 | End: 2021-02-26

## 2020-10-15 RX ORDER — TAMSULOSIN HYDROCHLORIDE 0.4 MG/1
0.4 CAPSULE ORAL DAILY
Qty: 10 CAPSULE | Refills: 0 | Status: SHIPPED | OUTPATIENT
Start: 2020-10-15 | End: 2020-10-25

## 2020-10-15 RX ADMIN — SODIUM CHLORIDE 65 ML: 9 INJECTION, SOLUTION INTRAVENOUS at 10:17

## 2020-10-15 RX ADMIN — SODIUM CHLORIDE 1000 ML: 9 INJECTION, SOLUTION INTRAVENOUS at 10:33

## 2020-10-15 RX ADMIN — ONDANSETRON 4 MG: 2 INJECTION INTRAMUSCULAR; INTRAVENOUS at 10:33

## 2020-10-15 RX ADMIN — IOPAMIDOL 98 ML: 755 INJECTION, SOLUTION INTRAVENOUS at 10:17

## 2020-10-15 RX ADMIN — KETOROLAC TROMETHAMINE 30 MG: 30 INJECTION, SOLUTION INTRAMUSCULAR at 10:34

## 2020-10-15 ASSESSMENT — ENCOUNTER SYMPTOMS
NAUSEA: 1
BLOOD IN STOOL: 0
COUGH: 0
DYSURIA: 0
HEMATURIA: 0
CHILLS: 0
VOMITING: 1
ABDOMINAL PAIN: 1
CONSTIPATION: 0
DIARRHEA: 1
NEUROLOGICAL NEGATIVE: 1
FREQUENCY: 1
FEVER: 0
FLANK PAIN: 1
SHORTNESS OF BREATH: 0

## 2020-10-15 NOTE — ED NOTES
Pt ambulated out of department - states pain is improved - RX sent to pharmacy - patient ambulated to pharmacy

## 2020-10-15 NOTE — DISCHARGE INSTRUCTIONS
Drink plenty of fluids.Rest.  Flomax 0.4mg daily.  Zofran 4 mg every 8 hours as needed for nausea/vomiting.  Tylenol (Acetaminophen) 650 mg every 4-6 hours as needed for mild/moderate pain.  Ibuprofen 400-600 mg every 6-8 hours as needed for mild/moderate pain. Take with food. Stop if it is causing nausea or abdominal pain.   Add Oxycodone 5 mg every 6 hours if needed for moderate/severe pain. Do not use alcohol, operate machinery, drive, or climb on ladders for 8 hours after taking Oxycodone.     If not improving in 5-7 days, call Urology to make an appointment. (referral sent). [phone].  Return to the emergency department for fever, not tolerating the pain, vomiting or unable to keep fluids down, or worse in any way.

## 2020-10-15 NOTE — ED PROVIDER NOTES
History     Chief Complaint   Patient presents with     Abdominal Pain     RLQ abd pain, pain off and on since Tuesday. this am pt had diarrhea, no n/v.  Does not have appendix     HPI  Franklin Mejia is a 50 year old male who presents to the emergency department for evaluation right lower quadrant abdominal pain and right flank pain.  Symptoms started on Tuesday morning (2 days ago) while he was at work.  Accompanied by several episodes of diarrhea.  Symptoms resolved later in the day on Tuesday.  No pain yesterday.  Pain started again while he was at work this morning with right lower quadrant and right flank pain.  Accompanied by nausea and vomiting and diarrhea.  Denies fever chills.  Patient reports history of kidney stone in the past but was never evaluated for it.    Allergies:  Allergies   Allergen Reactions     Penicillins        Problem List:    Patient Active Problem List    Diagnosis Date Noted     Multiple benign nevi 2019     Priority: Medium     Vitamin D deficiency 2019     Priority: Medium     Overweight (BMI 25.0-29.9) 2015     Priority: Medium     Gastroesophageal reflux disease, esophagitis presence not specified 2013     Priority: Medium     IMO Regulatory Load OCT 2020          Past Medical History:    No past medical history on file.    Past Surgical History:    Past Surgical History:   Procedure Laterality Date     APPENDECTOMY         Family History:    Family History   Problem Relation Age of Onset     Lipids Mother      Hypertension Father          copd 81     Macular Degeneration Father      Diabetes Father      Diabetes Brother      Cerebrovascular Disease No family hx of      Thyroid Disease No family hx of      Glaucoma No family hx of      Cancer No family hx of        Social History:  Marital Status:   [2]  Social History     Tobacco Use     Smoking status: Former Smoker     Quit date: 2006     Years since quittin.1      Smokeless tobacco: Former User     Types: Chew     Quit date: 9/1/2018   Substance Use Topics     Alcohol use: Yes     Alcohol/week: 0.0 standard drinks     Comment: occasional      Drug use: No        Medications:         ondansetron (ZOFRAN ODT) 4 MG ODT tab       oxyCODONE (ROXICODONE) 5 MG tablet       tamsulosin (FLOMAX) 0.4 MG capsule       tiZANidine (ZANAFLEX) 4 MG tablet          Review of Systems   Constitutional: Negative for chills and fever.   HENT: Negative for congestion.    Respiratory: Negative for cough and shortness of breath.    Cardiovascular: Negative for chest pain.   Gastrointestinal: Positive for abdominal pain, diarrhea, nausea and vomiting. Negative for blood in stool and constipation.   Genitourinary: Positive for flank pain and frequency. Negative for dysuria, hematuria and urgency.   Skin: Negative.    Neurological: Negative.    All other systems reviewed and are negative.      Physical Exam   BP: (!) 159/91  Pulse: 72  Temp: 98  F (36.7  C)  Resp: 16  Weight: 91.6 kg (202 lb)  SpO2: 99 %      Physical Exam  Constitutional:       Appearance: He is well-developed.   HENT:      Head: Normocephalic and atraumatic.   Cardiovascular:      Rate and Rhythm: Normal rate and regular rhythm.      Heart sounds: Normal heart sounds.   Pulmonary:      Effort: Pulmonary effort is normal.      Breath sounds: Normal breath sounds.   Abdominal:      General: There is no distension.      Palpations: Abdomen is soft. There is no hepatomegaly or splenomegaly.      Tenderness: There is abdominal tenderness in the right lower quadrant. There is no right CVA tenderness or left CVA tenderness.   Skin:     General: Skin is warm and dry.   Neurological:      General: No focal deficit present.      Mental Status: He is alert and oriented to person, place, and time.         ED Course        Procedures             Results for orders placed or performed during the hospital encounter of 10/15/20 (from the past 24  hour(s))   UA reflex to Microscopic and Culture    Specimen: Midstream Urine   Result Value Ref Range    Color Urine Yellow     Appearance Urine Slightly Cloudy     Glucose Urine Negative NEG^Negative mg/dL    Bilirubin Urine Negative NEG^Negative    Ketones Urine Negative NEG^Negative mg/dL    Specific Gravity Urine 1.019 1.003 - 1.035    Blood Urine Large (A) NEG^Negative    pH Urine 6.0 5.0 - 7.0 pH    Protein Albumin Urine 30 (A) NEG^Negative mg/dL    Urobilinogen mg/dL 0.0 0.0 - 2.0 mg/dL    Nitrite Urine Negative NEG^Negative    Leukocyte Esterase Urine Negative NEG^Negative    Source Midstream Urine     RBC Urine >182 (H) 0 - 2 /HPF    WBC Urine 5 0 - 5 /HPF    Yeast Urine Few (A) NEG^Negative /HPF    Squamous Epithelial /HPF Urine 1 0 - 1 /HPF   Comprehensive metabolic panel   Result Value Ref Range    Sodium 141 133 - 144 mmol/L    Potassium 4.1 3.4 - 5.3 mmol/L    Chloride 110 (H) 94 - 109 mmol/L    Carbon Dioxide 29 20 - 32 mmol/L    Anion Gap 2 (L) 3 - 14 mmol/L    Glucose 110 (H) 70 - 99 mg/dL    Urea Nitrogen 16 7 - 30 mg/dL    Creatinine 1.00 0.66 - 1.25 mg/dL    GFR Estimate 86 >60 mL/min/[1.73_m2]    GFR Estimate If Black >90 >60 mL/min/[1.73_m2]    Calcium 10.8 (H) 8.5 - 10.1 mg/dL    Bilirubin Total 0.6 0.2 - 1.3 mg/dL    Albumin 4.2 3.4 - 5.0 g/dL    Protein Total 7.3 6.8 - 8.8 g/dL    Alkaline Phosphatase 74 40 - 150 U/L    ALT 38 0 - 70 U/L    AST 23 0 - 45 U/L   Lipase   Result Value Ref Range    Lipase 71 (L) 73 - 393 U/L   CBC with platelets differential   Result Value Ref Range    WBC 7.9 4.0 - 11.0 10e9/L    RBC Count 5.24 4.4 - 5.9 10e12/L    Hemoglobin 14.7 13.3 - 17.7 g/dL    Hematocrit 46.0 40.0 - 53.0 %    MCV 88 78 - 100 fl    MCH 28.1 26.5 - 33.0 pg    MCHC 32.0 31.5 - 36.5 g/dL    RDW 13.9 10.0 - 15.0 %    Platelet Count 215 150 - 450 10e9/L    Diff Method Automated Method     % Neutrophils 73.9 %    % Lymphocytes 18.3 %    % Monocytes 4.7 %    % Eosinophils 1.9 %    % Basophils  0.8 %    % Immature Granulocytes 0.4 %    Nucleated RBCs 0 0 /100    Absolute Neutrophil 5.8 1.6 - 8.3 10e9/L    Absolute Lymphocytes 1.4 0.8 - 5.3 10e9/L    Absolute Monocytes 0.4 0.0 - 1.3 10e9/L    Absolute Eosinophils 0.2 0.0 - 0.7 10e9/L    Absolute Basophils 0.1 0.0 - 0.2 10e9/L    Abs Immature Granulocytes 0.0 0 - 0.4 10e9/L    Absolute Nucleated RBC 0.0    CT Abdomen Pelvis w Contrast    Narrative    CT ABDOMEN AND PELVIS WITH CONTRAST 10/15/2020 10:24 AM    CLINICAL HISTORY: Right lower quadrant and right flank pain. Diarrhea.    TECHNIQUE: CT scan of the abdomen and pelvis was performed following  injection of IV contrast. Multiplanar reformats were obtained. Dose  reduction techniques were used.    CONTRAST: 98 mL Isovue-370    COMPARISON: None.    FINDINGS:   LOWER CHEST: The visualized lung bases are clear. Small hiatal hernia.    HEPATOBILIARY: Numerous gallstones are noted within the gallbladder.    PANCREAS: Normal.    SPLEEN: Normal.    ADRENAL GLANDS: Normal.    KIDNEYS/BLADDER: There is an obstructing 0.4 cm stone in the mid right  ureter, causing mild right hydronephrosis and perinephric stranding.  There is also mildly delayed enhancement of the right kidney. There  are two nonobstructing stones in the lower pole of the right kidney,  measuring 0.2 cm each. There are two nonobstructing stones in the left  kidney, with the largest in the lower pole measuring 0.3 cm. No  ureteral calculi or hydronephrosis on the left.    BOWEL: No bowel obstruction. Mild diffuse bowel wall thickening in the  transverse, descending, and sigmoid colon could be related to lack of  distention, although mild colitis could also have this appearance. The  appendix is not visualized, and is surgically absent by history.    PELVIC ORGANS: Unremarkable.    LYMPH NODES: No enlarged lymph nodes are identified in the abdomen or  pelvis.    VASCULATURE: Unremarkable.    ADDITIONAL FINDINGS: None.    MUSCULOSKELETAL:  Unremarkable.      Impression    IMPRESSION:   1.  Obstructing 0.4 cm stone in the mid right ureter causes mild right  hydronephrosis and mildly delayed enhancement of the right kidney.  2.  Few nonobstructing stones in both kidneys, with the largest on the  left measuring 0.3 cm.  3.  Mild diffuse bowel wall thickening in the transverse, descending,  and sigmoid colon could be related to lack of distention, although a  mild colitis could also have this appearance.  4.  Cholelithiasis.       Medications   ondansetron (ZOFRAN) injection 4 mg (4 mg Intravenous Given 10/15/20 1033)   0.9% sodium chloride BOLUS (0 mLs Intravenous Stopped 10/15/20 1107)     Followed by   sodium chloride 0.9% infusion (has no administration in time range)   iopamidol (ISOVUE-370) solution 98 mL (98 mLs Intravenous Given 10/15/20 1017)   sodium chloride 0.9 % bag 500mL for CT scan flush use (65 mLs Intravenous Given 10/15/20 1017)   ketorolac (TORADOL) injection 30 mg (30 mg Intravenous Given 10/15/20 1034)       Assessments & Plan (with Medical Decision Making)     50-year-old male with 2 days of right flank and right lower quadrant abdominal pain.  Pain has been intermittent.  No fevers.  Accompanied by nausea, vomiting, and diarrhea.  Reports history of kidney stone in the past.  On exam patient is alert and oriented.  Mild distress.  Tenderness to the right lower quadrant, but otherwise the abdomen is soft.  No evidence of peritonitis.  No right CVA tenderness.  Normotensive.  Afebrile.  No tachycardia.  UA reveals large blood, 30 protein, and greater than 182 RBC.  No evidence for infection.  CBC is normal.  Lipase is 71.  Kidney function labs are normal.  LFTs are normal.  Electrolytes are essentially normal.  Calcium mildly elevated at 10.8.  CT of the abdomen/pelvis reveals a 0.4 cm obstructing stone to the right mid ureter with mild right hydronephrosis.  There are few nonobstructing stones in both kidneys.  Mild diffuse bowel  wall thickening in the transverse, descending, and sigmoid colon which could indicate mild colitis.  Please see radiologist interpretation as noted above.  I discussed the lab and imaging findings with patient.  His symptoms and exam are consistent with renal colic due to ureteral stone.  Patient was given IV normal saline 1 L bolus, IV Zofran, and IV Toradol with improvement in his pain.  Patient appears stable for discharge.  Plan as follows:  Drink plenty of fluids.Rest.  Flomax 0.4mg daily.  Zofran 4 mg every 8 hours as needed for nausea/vomiting.  Tylenol (Acetaminophen) 650 mg every 4-6 hours as needed for mild/moderate pain.  Ibuprofen 400-600 mg every 6-8 hours as needed for mild/moderate pain. Take with food. Stop if it is causing nausea or abdominal pain.   Add Oxycodone 5 mg every 6 hours if needed for moderate/severe pain. Do not use alcohol, operate machinery, drive, or climb on ladders for 8 hours after taking Oxycodone.     If not improving in 5-7 days, call Urology to make an appointment. (referral sent). [phone].  Return to the emergency department for fever, not tolerating the pain, vomiting or unable to keep fluids down, or worse in any way.    I have reviewed the nursing notes.    I have reviewed the findings, diagnosis, plan and need for follow up with the patient.      New Prescriptions    ONDANSETRON (ZOFRAN ODT) 4 MG ODT TAB    Take 1 tablet (4 mg) by mouth every 8 hours as needed for nausea    OXYCODONE (ROXICODONE) 5 MG TABLET    Take 1 tablet (5 mg) by mouth every 6 hours as needed for moderate to severe pain    TAMSULOSIN (FLOMAX) 0.4 MG CAPSULE    Take 1 capsule (0.4 mg) by mouth daily for 10 doses       Final diagnoses:   Ureteral stone       10/15/2020   [facility] EMERGENCY DEPT     Soha, JAIME Wilkerson CNP  10/15/20 1114

## 2020-10-15 NOTE — ED AVS SNAPSHOT
[facility] Emergency Dept  [address] [address]  Phone: [phone]  Fax: [phone]                                    Farnklin Mejia   MRN: [id number]    Department: [facility] Emergency Dept   Date of Visit: 10/15/2020           After Visit Summary Signature Page    I have received my discharge instructions, and my questions have been answered. I have discussed any challenges I see with this plan with the nurse or doctor.    ..........................................................................................................................................  Patient/Patient Representative Signature      ..........................................................................................................................................  Patient Representative Print Name and Relationship to Patient    ..................................................               ................................................  Date                                   Time    ..........................................................................................................................................  Reviewed by Signature/Title    ...................................................              ..............................................  Date                                               Time          22EPIC Rev 08/18

## 2021-01-10 ENCOUNTER — HEALTH MAINTENANCE LETTER (OUTPATIENT)
Age: 51
End: 2021-01-10

## 2021-02-26 ENCOUNTER — OFFICE VISIT (OUTPATIENT)
Dept: FAMILY MEDICINE | Facility: CLINIC | Age: 51
End: 2021-02-26
Payer: COMMERCIAL

## 2021-02-26 ENCOUNTER — ANCILLARY PROCEDURE (OUTPATIENT)
Dept: GENERAL RADIOLOGY | Facility: CLINIC | Age: 51
End: 2021-02-26
Attending: FAMILY MEDICINE
Payer: COMMERCIAL

## 2021-02-26 VITALS
HEIGHT: 74 IN | DIASTOLIC BLOOD PRESSURE: 84 MMHG | SYSTOLIC BLOOD PRESSURE: 131 MMHG | HEART RATE: 98 BPM | TEMPERATURE: 97.6 F | BODY MASS INDEX: 26.18 KG/M2 | WEIGHT: 204 LBS

## 2021-02-26 DIAGNOSIS — R68.84 JAW PAIN: ICD-10-CM

## 2021-02-26 DIAGNOSIS — M25.562 LEFT KNEE PAIN, UNSPECIFIED CHRONICITY: Primary | ICD-10-CM

## 2021-02-26 PROCEDURE — 99213 OFFICE O/P EST LOW 20 MIN: CPT | Performed by: FAMILY MEDICINE

## 2021-02-26 PROCEDURE — 73560 X-RAY EXAM OF KNEE 1 OR 2: CPT | Mod: 26 | Performed by: RADIOLOGY

## 2021-02-26 ASSESSMENT — MIFFLIN-ST. JEOR: SCORE: 1845.09

## 2021-02-26 NOTE — PATIENT INSTRUCTIONS
Over the counter meds as needed    Schedule consult with orthopedics     Call with problems/ questions

## 2021-02-26 NOTE — PROGRESS NOTES
"         Benji Reid is a 50 year old who presents for the following health issues     HPI       Left knee pain problems since the weekend after Thanksgiving      Review of Systems     Chopping wood, tree hit patient in jaw, lifted off ground, chainsaw fell on him    Twisted leg     Not bad pain initially    Got infection in mouth    When kneeling, feel water under knee    Sore in upper calf     No history of knee problems in past    Does not feel unstable          Objective    /84 (BP Location: Left arm, Patient Position: Chair, Cuff Size: Adult Regular)   Pulse 98   Temp 97.6  F (36.4  C) (Oral)   Ht 1.88 m (6' 2\")   Wt 92.5 kg (204 lb)   BMI 26.19 kg/m    Body mass index is 26.19 kg/m .  Physical Exam    Full physical not done     Mentation and affect are fine    No tremor of speech or extremity    Knee appears normal, no assymetry    No swelling/ discoloration/ tenderness    On drawer testing the left knee does seem to have more laxity than right     Rotational testing okay    Strength okay    Range of motion both knees fine    Xray done, normal to my reading             ASSESSMENT / PLAN:  (M27.925) Left knee pain, unspecified chronicity  (primary encounter diagnosis)  Comment: discussed in detail with patient.  Given continued symptoms since this happened aroung thanksgiving and the laxity seen in exam, suspicious for ACL type injury.  Prudent to see orthopedics.  Patient will call if he does not hear from schedulers. Can use occasional over the counter meds.  Use knee sleeve when active.    Plan: XR Knee Standing Left 2 Views, Orthopedic &         Spine  Referral             (O47.90) Jaw pain  Comment: patient seeing dentist for these issues   Plan: as above       I reviewed the patient's medications, allergies, medical history, family history, and social history.    Eben Vásquez MD              "

## 2021-03-01 ENCOUNTER — OFFICE VISIT (OUTPATIENT)
Dept: OPTOMETRY | Facility: CLINIC | Age: 51
End: 2021-03-01
Payer: COMMERCIAL

## 2021-03-01 DIAGNOSIS — Z01.01 ENCOUNTER FOR EXAMINATION OF EYES AND VISION WITH ABNORMAL FINDINGS: Primary | ICD-10-CM

## 2021-03-01 DIAGNOSIS — H43.813 PVD (POSTERIOR VITREOUS DETACHMENT), BILATERAL: ICD-10-CM

## 2021-03-01 DIAGNOSIS — H52.13 HIGH MYOPIA, BOTH EYES: ICD-10-CM

## 2021-03-01 DIAGNOSIS — H52.4 PRESBYOPIA: ICD-10-CM

## 2021-03-01 DIAGNOSIS — H52.223 REGULAR ASTIGMATISM OF BOTH EYES: ICD-10-CM

## 2021-03-01 PROCEDURE — 92014 COMPRE OPH EXAM EST PT 1/>: CPT | Performed by: OPTOMETRIST

## 2021-03-01 ASSESSMENT — REFRACTION_CURRENTRX
OD_SPHERE: -6.00
OD_AXIS: 090
OD_DIAMETER: 14.5
OD_CYLINDER: -2.25
OS_CYLINDER: -1.25
OS_SPHERE: -6.00
OD_CYLINDER: -2.25
OS_BASECURVE: 8.50
OS_BASECURVE: 8.50
OS_DIAMETER: 14.5
OD_AXIS: 090
OS_AXIS: 080
OD_SPHERE: -6.50
OS_AXIS: 080
OD_BASECURVE: 8.50
OS_SPHERE: -6.00
OD_DIAMETER: 14.5
OS_DIAMETER: 14.5
OD_BASECURVE: 8.50
OS_CYLINDER: -1.25

## 2021-03-01 ASSESSMENT — REFRACTION_MANIFEST
OD_CYLINDER: +3.00
OS_SPHERE: -8.25
OD_AXIS: 005
OS_ADD: +2.25
OS_CYLINDER: +1.75
OS_AXIS: 172
OD_SPHERE: -9.75
OD_ADD: +2.25

## 2021-03-01 ASSESSMENT — TONOMETRY
IOP_METHOD: APPLANATION
OD_IOP_MMHG: 16
OS_IOP_MMHG: 16

## 2021-03-01 ASSESSMENT — CUP TO DISC RATIO
OS_RATIO: 0.5
OD_RATIO: 0.5

## 2021-03-01 ASSESSMENT — VISUAL ACUITY
CORRECTION_TYPE: CONTACTS
METHOD: SNELLEN - LINEAR
OS_CC: J16
OS_CC: 20/20
OD_CC: 20/30
OD_CC+: -3
OS_CC+: -2
OD_CC: J16

## 2021-03-01 ASSESSMENT — EXTERNAL EXAM - RIGHT EYE: OD_EXAM: NORMAL

## 2021-03-01 ASSESSMENT — SLIT LAMP EXAM - LIDS
COMMENTS: NORMAL
COMMENTS: NORMAL

## 2021-03-01 ASSESSMENT — EXTERNAL EXAM - LEFT EYE: OS_EXAM: NORMAL

## 2021-03-01 ASSESSMENT — CONF VISUAL FIELD
METHOD: COUNTING FINGERS
OD_NORMAL: 1
OS_NORMAL: 1

## 2021-03-01 NOTE — PROGRESS NOTES
"Chief Complaint   Patient presents with     Annual Eye Exam     Accompanied by self     Last Eye Exam: 10/17/19  Dilated Previously: Yes     What are you currently using to see?  contacts     Distance Vision Acuity: Satisfied with vision     Near Vision Acuity: Not satisfied, uses reading glasses over contacts, needs more light to read     Eye Comfort: good  Do you use eye drops? : No  Occupation or Hobbies:      JOANNA Roper 3/1/2021 3:22 PM    Medical, surgical and family histories reviewed and updated 3/1/2021.        OBJECTIVE: See Ophthalmology exam     ASSESSMENT:    ICD-10-CM    1. Encounter for examination of eyes and vision with abnormal findings  Z01.01    2. PVD (posterior vitreous detachment), bilateral  H43.813    3. High myopia, both eyes  H52.13    4. Regular astigmatism of both eyes  H52.223    5. Presbyopia  H52.4       PLAN:     Franklin Mejia aware  eye exam results will be sent to [facility]  Patient Instructions   You have a PVD- posterior vitreous detachment which is due to the gel of the eye shrinking and clumping together.  This can sometimes cause holes or tears in the retina.  The signs of a retinal detachment are flashes of light or a \"curtain veil\" coming over your vision. If you notice any of these changes return to clinic for re-evaluation.     Franklin was advised of today's exam findings.  Copy of glasses Rx provided today. Discussed option of progressive lenses. Allow 2 weeks to adapt to the new lenses.     We will order in some trial contact lenses for you to try. Once the lenses have arrived to our clinic, we will call to notify you and you can pick them up without an appointment.   If adequate comfort/vision with contact lenses, we can update the prescription.     Return in 1 year for eye exam, or sooner if needed.    The effects of the dilating drops last for 4- 6 hours.  You will be more sensitive to light and vision will be blurry up " close.  Mydriatic sunglasses were given if needed.      Haris Campbell O.D.  [address]  [place]  [address]    [phone]

## 2021-03-01 NOTE — LETTER
"    3/1/2021         RE: Franklin Mejia  [address]  [place] [address]        Dear Colleague,    Thank you for referring your patient, Franklin Mejia, to the [facility]. Please see a copy of my visit note below.    Chief Complaint   Patient presents with     Annual Eye Exam     Accompanied by self     Last Eye Exam: 10/17/19  Dilated Previously: Yes     What are you currently using to see?  contacts     Distance Vision Acuity: Satisfied with vision     Near Vision Acuity: Not satisfied, uses reading glasses over contacts, needs more light to read     Eye Comfort: good  Do you use eye drops? : No  Occupation or Hobbies:      JOANNA Roper 3/1/2021 3:22 PM    Medical, surgical and family histories reviewed and updated 3/1/2021.        OBJECTIVE: See Ophthalmology exam     ASSESSMENT:    ICD-10-CM    1. Encounter for examination of eyes and vision with abnormal findings  Z01.01    2. PVD (posterior vitreous detachment), bilateral  H43.813    3. High myopia, both eyes  H52.13    4. Regular astigmatism of both eyes  H52.223    5. Presbyopia  H52.4       PLAN:     Tenzinjustinejoseluis CHARLES Tranemeka aware  eye exam results will be sent to Branden Esquivel  Patient Instructions   You have a PVD- posterior vitreous detachment which is due to the gel of the eye shrinking and clumping together.  This can sometimes cause holes or tears in the retina.  The signs of a retinal detachment are flashes of light or a \"curtain veil\" coming over your vision. If you notice any of these changes return to clinic for re-evaluation.     Tenzinsage was advised of today's exam findings.  Copy of glasses Rx provided today. Discussed option of progressive lenses. Allow 2 weeks to adapt to the new lenses.     We will order in some trial contact lenses for you to try. Once the lenses have arrived to our clinic, we will call to notify you and you can pick them up without an appointment. "   If adequate comfort/vision with contact lenses, we can update the prescription.     Return in 1 year for eye exam, or sooner if needed.    The effects of the dilating drops last for 4- 6 hours.  You will be more sensitive to light and vision will be blurry up close.  Mydriatic sunglasses were given if needed.      Haris Campbell O.D.  [address] [place]  [address]    [phone]             Again, thank you for allowing me to participate in the care of your patient.        Sincerely,        Haris Campbell, KIARA

## 2021-03-01 NOTE — PATIENT INSTRUCTIONS
"You have a PVD- posterior vitreous detachment which is due to the gel of the eye shrinking and clumping together.  This can sometimes cause holes or tears in the retina.  The signs of a retinal detachment are flashes of light or a \"curtain veil\" coming over your vision. If you notice any of these changes return to clinic for re-evaluation.     Franklin was advised of today's exam findings.  Copy of glasses Rx provided today. Discussed option of progressive lenses. Allow 2 weeks to adapt to the new lenses.     We will order in some trial contact lenses for you to try. Once the lenses have arrived to our clinic, we will call to notify you and you can pick them up without an appointment.   If adequate comfort/vision with contact lenses, we can update the prescription.     Return in 1 year for eye exam, or sooner if needed.    The effects of the dilating drops last for 4- 6 hours.  You will be more sensitive to light and vision will be blurry up close.  Mydriatic sunglasses were given if needed.      Haris Campbell O.D.  [address] [place]  [address]    [phone]    "

## 2021-03-18 NOTE — PROGRESS NOTES
SUBJECTIVE:   Franklin Mejia is a 50 year old male who is seen in consultation at the request of Dr. Vásquez for evaluation of left knee pain.  Duration: 4+ months  Twisted the knee with an incident chopping wood.  He saw dr. Vásquez because he was concerned about swelling, but not a lot of pain.    Present symptoms: occasional feels some fullness when kneels.  Occasional pain with certain activities.   No catching, locking or giving-way  When pain occurs, it is deep, some pain kneeling occasionally.  Overall the symptoms are minor.     Treatments tried to this point: none. Problems not bad enough in knee.    Previous knee issues: none    Past Medical History:   Past Medical History:   Diagnosis Date     Nephrolithiasis 10/2020     Past Surgical History:   Past Surgical History:   Procedure Laterality Date     APPENDECTOMY       Family History:   Family History   Problem Relation Age of Onset     Lipids Mother      Hypertension Father          copd 81     Macular Degeneration Father      Diabetes Father      Diabetes Brother      Cerebrovascular Disease No family hx of      Thyroid Disease No family hx of      Glaucoma No family hx of      Cancer No family hx of      Social History:   Social History     Tobacco Use     Smoking status: Former Smoker     Quit date: 2006     Years since quittin.5     Smokeless tobacco: Former User     Types: Chew     Quit date: 2018   Substance Use Topics     Alcohol use: Yes     Alcohol/week: 0.0 standard drinks     Comment: occasional        Review of Systems:  Constitutional:  NEGATIVE for fever, chills, change in weight  Integumentary/Skin:  NEGATIVE for worrisome rashes, moles or lesions  Eyes:  NEGATIVE for vision changes or irritation  ENT/Mouth:  NEGATIVE for ear, mouth and throat problems  Resp:  NEGATIVE for significant cough or SOB  Breast:  NEGATIVE for masses, tenderness or discharge  CV:  NEGATIVE for chest pain, palpitations or peripheral  "edema  GI:  NEGATIVE for nausea, abdominal pain, heartburn, or change in bowel habits  :  Negative   Musculoskeletal:  See HPI above  Neuro:  NEGATIVE for weakness, dizziness or paresthesias  Endocrine:  NEGATIVE for temperature intolerance, skin/hair changes  Heme/allergy/immune:  NEGATIVE for bleeding problems  Psychiatric:  NEGATIVE for changes in mood or affect      OBJECTIVE:  Physical Exam:  /81 (BP Location: Left arm, Patient Position: Sitting, Cuff Size: Adult Regular)   Pulse 83   Ht 1.88 m (6' 2\")   Wt 92.5 kg (204 lb)   SpO2 99%   BMI 26.19 kg/m    General Appearance: healthy, alert and no distress   Skin: no suspicious lesions or rashes  Neuro: Normal strength and tone, mentation intact and speech normal  Vascular: good pulses, and cappillary refill   Lymph: no lymphadenopathy   Psych:  mentation appears normal and affect normal/bright  Resp: no increased work of breathing     Left Knee Exam:  Gait: walks with normal gait  Alignment: normal   Squat: 100 % painfree.    Patellofemoral joint: mild crepitations in the patellofemoral joint.  Effusion: minimal  ROM: full  Tender: medial facet of the patella  Masses: none  Ligaments:   Lachman's: stable   Anterior/Posterior drawer: stable,   Varus/Valgus stress: stable to varus and valgus stress  McMurrays: no pain but significant click    X-rays:  Obtained 2/26 left knee, reviewed in the office with the patient today:  normal    MRI:    none     ASSESSMENT:   Encounter Diagnoses   Name Primary?     Chronic pain of left knee Yes     Left knee pain, unspecified chronicity       Fullness he describes could be intermittent bursitis  Maybe some mild osteoarthritis   Otherwise knee exam is pretty good.     PLAN:   Use knee pad with kneeling  Keep legs strong  nsaids as needed     Return to clinic: as needed     VLAD Morrison MD  Dept. Orthopedic Surgery  [facility]   "

## 2021-03-24 ENCOUNTER — OFFICE VISIT (OUTPATIENT)
Dept: ORTHOPEDICS | Facility: CLINIC | Age: 51
End: 2021-03-24
Attending: FAMILY MEDICINE
Payer: COMMERCIAL

## 2021-03-24 VITALS
WEIGHT: 204 LBS | HEIGHT: 74 IN | BODY MASS INDEX: 26.18 KG/M2 | OXYGEN SATURATION: 99 % | HEART RATE: 83 BPM | SYSTOLIC BLOOD PRESSURE: 133 MMHG | DIASTOLIC BLOOD PRESSURE: 81 MMHG

## 2021-03-24 DIAGNOSIS — M25.562 CHRONIC PAIN OF LEFT KNEE: Primary | ICD-10-CM

## 2021-03-24 DIAGNOSIS — G89.29 CHRONIC PAIN OF LEFT KNEE: Primary | ICD-10-CM

## 2021-03-24 DIAGNOSIS — M25.562 LEFT KNEE PAIN, UNSPECIFIED CHRONICITY: ICD-10-CM

## 2021-03-24 PROCEDURE — 99243 OFF/OP CNSLTJ NEW/EST LOW 30: CPT | Performed by: ORTHOPAEDIC SURGERY

## 2021-03-24 ASSESSMENT — MIFFLIN-ST. JEOR: SCORE: 1845.09

## 2021-03-24 ASSESSMENT — PAIN SCALES - GENERAL: PAINLEVEL: NO PAIN (1)

## 2021-03-24 NOTE — LETTER
3/24/2021         RE: Franklin Mejia  [address]  [place] [address]        Dear Colleague,    Thank you for referring your patient, Franklin Mejia, to the [facility]. Please see a copy of my visit note below.    SUBJECTIVE:   Franklin Mejia is a 50 year old male who is seen in consultation at the request of Dr. Vásquez for evaluation of left knee pain.  Duration: 4+ months  Twisted the knee with an incident chopping wood.  He saw dr. Vásquez because he was concerned about swelling, but not a lot of pain.    Present symptoms: occasional feels some fullness when kneels.  Occasional pain with certain activities.   No catching, locking or giving-way  When pain occurs, it is deep, some pain kneeling occasionally.  Overall the symptoms are minor.     Treatments tried to this point: none. Problems not bad enough in knee.    Previous knee issues: none    Past Medical History:   Past Medical History:   Diagnosis Date     Nephrolithiasis 10/2020     Past Surgical History:   Past Surgical History:   Procedure Laterality Date     APPENDECTOMY       Family History:   Family History   Problem Relation Age of Onset     Lipids Mother      Hypertension Father          copd 81     Macular Degeneration Father      Diabetes Father      Diabetes Brother      Cerebrovascular Disease No family hx of      Thyroid Disease No family hx of      Glaucoma No family hx of      Cancer No family hx of      Social History:   Social History     Tobacco Use     Smoking status: Former Smoker     Quit date: 2006     Years since quittin.5     Smokeless tobacco: Former User     Types: Chew     Quit date: 2018   Substance Use Topics     Alcohol use: Yes     Alcohol/week: 0.0 standard drinks     Comment: occasional        Review of Systems:  Constitutional:  NEGATIVE for fever, chills, change in weight  Integumentary/Skin:  NEGATIVE for worrisome rashes, moles or lesions  Eyes:   "NEGATIVE for vision changes or irritation  ENT/Mouth:  NEGATIVE for ear, mouth and throat problems  Resp:  NEGATIVE for significant cough or SOB  Breast:  NEGATIVE for masses, tenderness or discharge  CV:  NEGATIVE for chest pain, palpitations or peripheral edema  GI:  NEGATIVE for nausea, abdominal pain, heartburn, or change in bowel habits  :  Negative   Musculoskeletal:  See HPI above  Neuro:  NEGATIVE for weakness, dizziness or paresthesias  Endocrine:  NEGATIVE for temperature intolerance, skin/hair changes  Heme/allergy/immune:  NEGATIVE for bleeding problems  Psychiatric:  NEGATIVE for changes in mood or affect      OBJECTIVE:  Physical Exam:  /81 (BP Location: Left arm, Patient Position: Sitting, Cuff Size: Adult Regular)   Pulse 83   Ht 1.88 m (6' 2\")   Wt 92.5 kg (204 lb)   SpO2 99%   BMI 26.19 kg/m    General Appearance: healthy, alert and no distress   Skin: no suspicious lesions or rashes  Neuro: Normal strength and tone, mentation intact and speech normal  Vascular: good pulses, and cappillary refill   Lymph: no lymphadenopathy   Psych:  mentation appears normal and affect normal/bright  Resp: no increased work of breathing     Left Knee Exam:  Gait: walks with normal gait  Alignment: normal   Squat: 100 % painfree.    Patellofemoral joint: mild crepitations in the patellofemoral joint.  Effusion: minimal  ROM: full  Tender: medial facet of the patella  Masses: none  Ligaments:   Lachman's: stable   Anterior/Posterior drawer: stable,   Varus/Valgus stress: stable to varus and valgus stress  McMurrays: no pain but significant click    X-rays:  Obtained 2/26 left knee, reviewed in the office with the patient today:  normal    MRI:    none     ASSESSMENT:   Encounter Diagnoses   Name Primary?     Chronic pain of left knee Yes     Left knee pain, unspecified chronicity       Fullness he describes could be intermittent bursitis  Maybe some mild osteoarthritis   Otherwise knee exam is pretty " good.     PLAN:   Use knee pad with kneeling  Keep legs strong  nsaids as needed     Return to clinic: as needed     VLAD Morrison MD  Dept. Orthopedic Surgery  [facility]       Again, thank you for allowing me to participate in the care of your patient.        Sincerely,        Clayton Morrison MD

## 2021-09-02 ENCOUNTER — MYC MEDICAL ADVICE (OUTPATIENT)
Dept: FAMILY MEDICINE | Facility: CLINIC | Age: 51
End: 2021-09-02

## 2021-09-02 DIAGNOSIS — Z00.00 ROUTINE HISTORY AND PHYSICAL EXAMINATION OF ADULT: Primary | ICD-10-CM

## 2021-09-03 NOTE — TELEPHONE ENCOUNTER
Pt has appt on 9/9/21  Please review and add desired labs.  Pt has had elevated   Calcium  TSH  Low testosterone and Vit D in 2019

## 2021-09-09 ENCOUNTER — LAB (OUTPATIENT)
Dept: LAB | Facility: CLINIC | Age: 51
End: 2021-09-09
Payer: COMMERCIAL

## 2021-09-09 ENCOUNTER — OFFICE VISIT (OUTPATIENT)
Dept: FAMILY MEDICINE | Facility: CLINIC | Age: 51
End: 2021-09-09
Payer: COMMERCIAL

## 2021-09-09 VITALS
HEART RATE: 77 BPM | TEMPERATURE: 98 F | HEIGHT: 74 IN | SYSTOLIC BLOOD PRESSURE: 126 MMHG | BODY MASS INDEX: 27.21 KG/M2 | WEIGHT: 212 LBS | OXYGEN SATURATION: 98 % | DIASTOLIC BLOOD PRESSURE: 91 MMHG

## 2021-09-09 DIAGNOSIS — Z00.00 ROUTINE HISTORY AND PHYSICAL EXAMINATION OF ADULT: ICD-10-CM

## 2021-09-09 DIAGNOSIS — Z00.00 ROUTINE GENERAL MEDICAL EXAMINATION AT A HEALTH CARE FACILITY: Primary | ICD-10-CM

## 2021-09-09 DIAGNOSIS — M26.609 TMJ (TEMPOROMANDIBULAR JOINT SYNDROME): ICD-10-CM

## 2021-09-09 DIAGNOSIS — D22.9 MULTIPLE BENIGN NEVI: ICD-10-CM

## 2021-09-09 DIAGNOSIS — E66.3 OVERWEIGHT (BMI 25.0-29.9): ICD-10-CM

## 2021-09-09 LAB
ANION GAP SERPL CALCULATED.3IONS-SCNC: 10 MMOL/L (ref 5–18)
BUN SERPL-MCNC: 8 MG/DL (ref 8–22)
CALCIUM SERPL-MCNC: 11 MG/DL (ref 8.5–10.5)
CHLORIDE BLD-SCNC: 108 MMOL/L (ref 98–107)
CHOLEST SERPL-MCNC: 188 MG/DL
CO2 SERPL-SCNC: 25 MMOL/L (ref 22–31)
CREAT SERPL-MCNC: 0.81 MG/DL (ref 0.7–1.3)
ERYTHROCYTE [DISTWIDTH] IN BLOOD BY AUTOMATED COUNT: 13.8 % (ref 10–15)
GFR SERPL CREATININE-BSD FRML MDRD: >90 ML/MIN/1.73M2
GLUCOSE BLD-MCNC: 91 MG/DL (ref 70–125)
HCT VFR BLD AUTO: 45.1 % (ref 40–53)
HDLC SERPL-MCNC: 63 MG/DL
HGB BLD-MCNC: 14.4 G/DL (ref 13.3–17.7)
LDLC SERPL CALC-MCNC: 101 MG/DL
MCH RBC QN AUTO: 27.4 PG (ref 26.5–33)
MCHC RBC AUTO-ENTMCNC: 31.9 G/DL (ref 31.5–36.5)
MCV RBC AUTO: 86 FL (ref 78–100)
PLATELET # BLD AUTO: 192 10E3/UL (ref 150–450)
POTASSIUM BLD-SCNC: 4.2 MMOL/L (ref 3.5–5)
PSA SERPL-MCNC: 0.5 UG/L (ref 0–3.5)
RBC # BLD AUTO: 5.25 10E6/UL (ref 4.4–5.9)
SODIUM SERPL-SCNC: 143 MMOL/L (ref 136–145)
TRIGL SERPL-MCNC: 120 MG/DL
WBC # BLD AUTO: 8.1 10E3/UL (ref 4–11)

## 2021-09-09 PROCEDURE — 85027 COMPLETE CBC AUTOMATED: CPT

## 2021-09-09 PROCEDURE — 36415 COLL VENOUS BLD VENIPUNCTURE: CPT

## 2021-09-09 PROCEDURE — 80061 LIPID PANEL: CPT

## 2021-09-09 PROCEDURE — G0103 PSA SCREENING: HCPCS

## 2021-09-09 PROCEDURE — 99396 PREV VISIT EST AGE 40-64: CPT | Performed by: FAMILY MEDICINE

## 2021-09-09 PROCEDURE — 86803 HEPATITIS C AB TEST: CPT

## 2021-09-09 PROCEDURE — 80048 BASIC METABOLIC PNL TOTAL CA: CPT

## 2021-09-09 ASSESSMENT — ENCOUNTER SYMPTOMS
PARESTHESIAS: 0
FREQUENCY: 0
HEMATURIA: 0
SHORTNESS OF BREATH: 0
EYE PAIN: 0
COUGH: 0
CONSTIPATION: 0
WEAKNESS: 0
ARTHRALGIAS: 0
PALPITATIONS: 0
CHILLS: 0
HEARTBURN: 0
NERVOUS/ANXIOUS: 0
NAUSEA: 0
DIARRHEA: 0
DIZZINESS: 0
SORE THROAT: 0
DYSURIA: 0
MYALGIAS: 0
FEVER: 0
HEADACHES: 0
JOINT SWELLING: 0
ABDOMINAL PAIN: 0
HEMATOCHEZIA: 0

## 2021-09-09 ASSESSMENT — MIFFLIN-ST. JEOR: SCORE: 1876.38

## 2021-09-09 NOTE — PROGRESS NOTES
SUBJECTIVE:   CC: Franklin Mejia is an 51 year old male who presents for a preventative health visit.       Patient has been advised of split billing requirements and indicates understanding: Yes  Healthy Habits:     Getting at least 3 servings of Calcium per day:  Yes    Bi-annual eye exam:  Yes    Dental care twice a year:  Yes    Sleep apnea or symptoms of sleep apnea:  Daytime drowsiness    Diet:  Regular (no restrictions)    Frequency of exercise:  2-3 days/week    Duration of exercise:  Less than 15 minutes    Taking medications regularly:  Yes    Medication side effects:  Other    PHQ-2 Total Score: 0    Additional concerns today:  No    He has some ongoing issues with TMJ syndrome including some clicking and grinding of his jaw and some headaches.  He was referred previously by his dentist and another provider for further evaluation and treatment of his TMJ issues and he has yet to pursue that.      Today's PHQ-2 Score:   PHQ-2 ( 1999 [employer]) 9/9/2021   Q1: Little interest or pleasure in doing things 0   Q2: Feeling down, depressed or hopeless 0   PHQ-2 Score 0   Q1: Little interest or pleasure in doing things Not at all   Q2: Feeling down, depressed or hopeless Not at all   PHQ-2 Score 0       Abuse: Current or Past(Physical, Sexual or Emotional)- No  Do you feel safe in your environment? Yes        Social History     Tobacco Use     Smoking status: Former Smoker     Quit date: 8/26/2006     Years since quitting: 15.0     Smokeless tobacco: Former User     Types: Chew     Quit date: 9/1/2018   Substance Use Topics     Alcohol use: Yes     Alcohol/week: 0.0 standard drinks     Comment: occasional          Alcohol Use 9/9/2021   Prescreen: >3 drinks/day or >7 drinks/week? No   Prescreen: >3 drinks/day or >7 drinks/week? -   AUDIT SCORE  -       Last PSA:   PSA   Date Value Ref Range Status   07/17/2018 0.45 0 - 4 ug/L Final     Comment:     Assay Method:  Chemiluminescence using Siemens Vista analyzer      Prostate Specific Antigen Screen   Date Value Ref Range Status   2021 0.50 0.00 - 3.50 ug/L Final       Reviewed orders with patient. Reviewed health maintenance and updated orders accordingly - Yes  Patient Active Problem List   Diagnosis     Gastroesophageal reflux disease, esophagitis presence not specified     Overweight (BMI 25.0-29.9)     Multiple benign nevi     Vitamin D deficiency     Nephrolithiasis     TMJ (temporomandibular joint syndrome)     Past Surgical History:   Procedure Laterality Date     APPENDECTOMY         Social History     Tobacco Use     Smoking status: Former Smoker     Quit date: 2006     Years since quitting: 15.0     Smokeless tobacco: Former User     Types: Chew     Quit date: 2018   Substance Use Topics     Alcohol use: Yes     Alcohol/week: 0.0 standard drinks     Comment: occasional      Family History   Problem Relation Age of Onset     Lipids Mother      Hypertension Father          copd 81     Macular Degeneration Father      Diabetes Father      Diabetes Brother      Cerebrovascular Disease No family hx of      Thyroid Disease No family hx of      Glaucoma No family hx of      Cancer No family hx of          No current outpatient medications on file.     Allergies   Allergen Reactions     Pcn [Penicillins]        Reviewed and updated as needed this visit by clinical staff  Tobacco  Allergies  Meds   Med Hx  Surg Hx  Fam Hx  Soc Hx        Reviewed and updated as needed this visit by Provider                    Review of Systems   Constitutional: Negative for chills and fever.   HENT: Negative for congestion, ear pain, hearing loss and sore throat.    Eyes: Negative for pain and visual disturbance.   Respiratory: Negative for cough and shortness of breath.    Cardiovascular: Negative for chest pain, palpitations and peripheral edema.   Gastrointestinal: Negative for abdominal pain, constipation, diarrhea, heartburn, hematochezia and nausea.  "  Genitourinary: Negative for discharge, dysuria, frequency, genital sores, hematuria, impotence and urgency.   Musculoskeletal: Negative for arthralgias, joint swelling and myalgias.   Skin: Negative for rash.   Neurological: Negative for dizziness, weakness, headaches and paresthesias.   Psychiatric/Behavioral: Negative for mood changes. The patient is not nervous/anxious.          OBJECTIVE:   BP (!) 126/91 (BP Location: Right arm, Patient Position: Sitting, Cuff Size: Adult Regular)   Pulse 77   Temp 98  F (36.7  C) (Oral)   Ht 1.88 m (6' 2\")   Wt 96.2 kg (212 lb)   SpO2 98%   BMI 27.22 kg/m      Physical Exam  GENERAL: alert, no distress and over weight  EYES: Eyes grossly normal to inspection, PERRL and conjunctivae and sclerae normal  HENT: ear canals and TM's normal, nose and mouth without ulcers or lesions.  He does have some crepitus and discomfort over the TMJ region.  NECK: no adenopathy, no asymmetry, masses, or scars and thyroid normal to palpation  RESP: lungs clear to auscultation - no rales, rhonchi or wheezes  CV: regular rate and rhythm, normal S1 S2, no S3 or S4, no murmur, click or rub, no peripheral edema and peripheral pulses strong  ABDOMEN: soft, nontender, no hepatosplenomegaly, no masses   MS: no gross musculoskeletal defects noted, no edema  SKIN: He has multiple pigmented nevi on his abdomen and some scattered ones elsewhere as well.  Some of these have color variation and irregular borders and may represent dysplastic nevi.  NEURO: Normal strength and tone, mentation intact and speech normal  PSYCH: mentation appears normal, affect normal/bright    Diagnostic Test Results:  Labs reviewed in Epic  Lab on 09/09/2021   Component Date Value Ref Range Status     Sodium 09/09/2021 143  136 - 145 mmol/L Final     Potassium 09/09/2021 4.2  3.5 - 5.0 mmol/L Final     Chloride 09/09/2021 108* 98 - 107 mmol/L Final     Carbon Dioxide (CO2) 09/09/2021 25  22 - 31 mmol/L Final     Anion Gap " 09/09/2021 10  5 - 18 mmol/L Final     Urea Nitrogen 09/09/2021 8  8 - 22 mg/dL Final     Creatinine 09/09/2021 0.81  0.70 - 1.30 mg/dL Final     Calcium 09/09/2021 11.0* 8.5 - 10.5 mg/dL Final     Glucose 09/09/2021 91  70 - 125 mg/dL Final     GFR Estimate 09/09/2021 >90  >60 mL/min/1.73m2 Final    As of July 11, 2021, eGFR is calculated by the CKD-EPI creatinine equation, without race adjustment. eGFR can be influenced by muscle mass, exercise, and diet. The reported eGFR is an estimation only and is only applicable if the renal function is stable.     Cholesterol 09/09/2021 188  <=199 mg/dL Final     Triglycerides 09/09/2021 120  <=149 mg/dL Final     Direct Measure HDL 09/09/2021 63  >=40 mg/dL Final    HDL Cholesterol Reference Range:     0-2 years:   No reference ranges established for patients under 2 years old  at [facility] for lipid analytes.    2-8 years:  Greater than 45 mg/dL     18 years and older:   Female: Greater than or equal to 50 mg/dL   Male:   Greater than or equal to 40 mg/dL     LDL Cholesterol Calculated 09/09/2021 101  <=129 mg/dL Final     WBC Count 09/09/2021 8.1  4.0 - 11.0 10e3/uL Final     RBC Count 09/09/2021 5.25  4.40 - 5.90 10e6/uL Final     Hemoglobin 09/09/2021 14.4  13.3 - 17.7 g/dL Final     Hematocrit 09/09/2021 45.1  40.0 - 53.0 % Final     MCV 09/09/2021 86  78 - 100 fL Final     MCH 09/09/2021 27.4  26.5 - 33.0 pg Final     MCHC 09/09/2021 31.9  31.5 - 36.5 g/dL Final     RDW 09/09/2021 13.8  10.0 - 15.0 % Final     Platelet Count 09/09/2021 192  150 - 450 10e3/uL Final     Prostate Specific Antigen Screen 09/09/2021 0.50  0.00 - 3.50 ug/L Final         ASSESSMENT/PLAN:       ICD-10-CM    1. Routine general medical examination at a health care facility  Z00.00    2. Multiple benign nevi  D22.9 Adult Dermatology Referral   3. TMJ (temporomandibular joint syndrome)  M26.609    4. Overweight (BMI 25.0-29.9)  E66.3      We discussed his multiple nevi and the  "potential need or role for excisional biopsy of some of them and I suggested he have a dermatology consultation for that  He will follow up on his TMJ issues with his dentist and/or a provider that he was referred to for that  We discussed the Covid vaccine and also a shingles vaccine and he will likely get the latter, but is cautious about the former  Plan a tentative recheck in 1 to 2 years, or sooner prn    Patient has been advised of split billing requirements and indicates understanding: Yes  COUNSELING:   Reviewed preventive health counseling, as reflected in patient instructions       Regular exercise       Healthy diet/nutrition    Estimated body mass index is 27.22 kg/m  as calculated from the following:    Height as of this encounter: 1.88 m (6' 2\").    Weight as of this encounter: 96.2 kg (212 lb).     Weight management plan: Discussed healthy diet and exercise guidelines    He reports that he quit smoking about 15 years ago. He quit smokeless tobacco use about 3 years ago.  His smokeless tobacco use included chew.      Counseling Resources:  ATP IV Guidelines  Pooled Cohorts Equation Calculator  FRAX Risk Assessment  ICSI Preventive Guidelines  Dietary Guidelines for Americans, 2010  USDA's MyPlate  ASA Prophylaxis  Lung CA Screening    Branden Esquivel MD  [facility]  "

## 2021-09-10 LAB — HCV AB SERPL QL IA: NONREACTIVE

## 2021-10-23 ENCOUNTER — HEALTH MAINTENANCE LETTER (OUTPATIENT)
Age: 51
End: 2021-10-23

## 2021-11-24 ENCOUNTER — OFFICE VISIT (OUTPATIENT)
Dept: URGENT CARE | Facility: URGENT CARE | Age: 51
End: 2021-11-24
Payer: COMMERCIAL

## 2021-11-24 VITALS
SYSTOLIC BLOOD PRESSURE: 122 MMHG | HEART RATE: 95 BPM | BODY MASS INDEX: 26.26 KG/M2 | WEIGHT: 204.5 LBS | OXYGEN SATURATION: 98 % | RESPIRATION RATE: 18 BRPM | DIASTOLIC BLOOD PRESSURE: 78 MMHG | TEMPERATURE: 98.1 F

## 2021-11-24 DIAGNOSIS — R05.9 COUGH: ICD-10-CM

## 2021-11-24 DIAGNOSIS — J01.00 ACUTE NON-RECURRENT MAXILLARY SINUSITIS: ICD-10-CM

## 2021-11-24 DIAGNOSIS — R07.0 THROAT PAIN: Primary | ICD-10-CM

## 2021-11-24 LAB — DEPRECATED S PYO AG THROAT QL EIA: NEGATIVE

## 2021-11-24 PROCEDURE — U0003 INFECTIOUS AGENT DETECTION BY NUCLEIC ACID (DNA OR RNA); SEVERE ACUTE RESPIRATORY SYNDROME CORONAVIRUS 2 (SARS-COV-2) (CORONAVIRUS DISEASE [COVID-19]), AMPLIFIED PROBE TECHNIQUE, MAKING USE OF HIGH THROUGHPUT TECHNOLOGIES AS DESCRIBED BY CMS-2020-01-R: HCPCS | Performed by: FAMILY MEDICINE

## 2021-11-24 PROCEDURE — 87651 STREP A DNA AMP PROBE: CPT | Performed by: FAMILY MEDICINE

## 2021-11-24 PROCEDURE — 99213 OFFICE O/P EST LOW 20 MIN: CPT | Performed by: FAMILY MEDICINE

## 2021-11-24 PROCEDURE — U0005 INFEC AGEN DETEC AMPLI PROBE: HCPCS | Performed by: FAMILY MEDICINE

## 2021-11-24 NOTE — PROGRESS NOTES
S: Very pleasant 51-year-old gentleman presents today with 10-day history of nasal drainage, sore throat, cough and postnasal drainage.  He has been trying acupuncture to no avail.  Patient notes his upper teeth hurt sensitive.  ROS: Negative for fever.  Negative for rashes.  SH: Negative for smoking    Meds: None    O: Blood pressure 122/78, temperature 98.1, pulse 95 respiration 18  NAD  HEENT-  --TMs clear  --Sclera and conjunctiva non-injected  --Pharynx mildly-erythematous  --No rhinorrhea  --Maxillary sinuses nonboggy.  Neck-  --Supple, no meningeal signs  --No cervical lymphadenopathy  Lungs--  --No adventitious sounds  Heart-  --Regular rate and rhythm  Skin-  --Pink and dry    A: Maxillary sinusitis    P: Augmentin 875 twice daily 14 days (patient noted that he had mild nausea with amoxicillin at one time but is able to tolerate with no allergies.)  Recommend nasal irrigation  Over-the-counter analgesics  Return if not improving

## 2021-11-25 LAB
GROUP A STREP BY PCR: NOT DETECTED
SARS-COV-2 RNA RESP QL NAA+PROBE: NEGATIVE

## 2021-12-12 ENCOUNTER — MYC REFILL (OUTPATIENT)
Dept: URGENT CARE | Facility: URGENT CARE | Age: 51
End: 2021-12-12
Payer: COMMERCIAL

## 2021-12-12 DIAGNOSIS — J01.00 ACUTE NON-RECURRENT MAXILLARY SINUSITIS: ICD-10-CM

## 2021-12-14 ENCOUNTER — TELEPHONE (OUTPATIENT)
Dept: FAMILY MEDICINE | Facility: CLINIC | Age: 51
End: 2021-12-14
Payer: COMMERCIAL

## 2021-12-14 ENCOUNTER — OFFICE VISIT (OUTPATIENT)
Dept: URGENT CARE | Facility: URGENT CARE | Age: 51
End: 2021-12-14
Payer: COMMERCIAL

## 2021-12-14 ENCOUNTER — ANCILLARY PROCEDURE (OUTPATIENT)
Dept: GENERAL RADIOLOGY | Facility: CLINIC | Age: 51
End: 2021-12-14
Attending: NURSE PRACTITIONER
Payer: COMMERCIAL

## 2021-12-14 VITALS
HEART RATE: 89 BPM | SYSTOLIC BLOOD PRESSURE: 142 MMHG | RESPIRATION RATE: 18 BRPM | OXYGEN SATURATION: 99 % | WEIGHT: 214 LBS | DIASTOLIC BLOOD PRESSURE: 80 MMHG | TEMPERATURE: 99 F | BODY MASS INDEX: 27.48 KG/M2

## 2021-12-14 DIAGNOSIS — Z20.822 ENCOUNTER FOR LABORATORY TESTING FOR COVID-19 VIRUS: ICD-10-CM

## 2021-12-14 DIAGNOSIS — R05.3 PERSISTENT COUGH FOR 3 WEEKS OR LONGER: ICD-10-CM

## 2021-12-14 DIAGNOSIS — J20.9 ACUTE BRONCHITIS, UNSPECIFIED ORGANISM: Primary | ICD-10-CM

## 2021-12-14 DIAGNOSIS — R05.8 POST-VIRAL COUGH SYNDROME: ICD-10-CM

## 2021-12-14 LAB — SARS-COV-2 RNA RESP QL NAA+PROBE: POSITIVE

## 2021-12-14 PROCEDURE — 99213 OFFICE O/P EST LOW 20 MIN: CPT | Performed by: NURSE PRACTITIONER

## 2021-12-14 PROCEDURE — U0003 INFECTIOUS AGENT DETECTION BY NUCLEIC ACID (DNA OR RNA); SEVERE ACUTE RESPIRATORY SYNDROME CORONAVIRUS 2 (SARS-COV-2) (CORONAVIRUS DISEASE [COVID-19]), AMPLIFIED PROBE TECHNIQUE, MAKING USE OF HIGH THROUGHPUT TECHNOLOGIES AS DESCRIBED BY CMS-2020-01-R: HCPCS | Performed by: NURSE PRACTITIONER

## 2021-12-14 PROCEDURE — 71046 X-RAY EXAM CHEST 2 VIEWS: CPT | Performed by: RADIOLOGY

## 2021-12-14 PROCEDURE — U0005 INFEC AGEN DETEC AMPLI PROBE: HCPCS | Performed by: NURSE PRACTITIONER

## 2021-12-14 RX ORDER — ALBUTEROL SULFATE 90 UG/1
1-2 AEROSOL, METERED RESPIRATORY (INHALATION) EVERY 4 HOURS PRN
Qty: 18 G | Refills: 0 | Status: SHIPPED | OUTPATIENT
Start: 2021-12-14 | End: 2022-03-16

## 2021-12-14 RX ORDER — GUAIFENESIN 600 MG/1
1200 TABLET, EXTENDED RELEASE ORAL 2 TIMES DAILY PRN
Qty: 40 TABLET | Refills: 0 | Status: SHIPPED | OUTPATIENT
Start: 2021-12-14 | End: 2022-03-16

## 2021-12-14 ASSESSMENT — ENCOUNTER SYMPTOMS
SINUS PRESSURE: 1
SINUS PAIN: 1
FATIGUE: 0
MYALGIAS: 0
NAUSEA: 0
DYSURIA: 0
CHILLS: 0
FEVER: 0
WHEEZING: 0
COUGH: 1
SHORTNESS OF BREATH: 0
VOMITING: 0
DIARRHEA: 0
SORE THROAT: 0
APPETITE CHANGE: 0
RHINORRHEA: 1
ACTIVITY CHANGE: 0
HEADACHES: 0

## 2021-12-14 NOTE — TELEPHONE ENCOUNTER
Patient calling to request antibiotic for sinus infection. Patient advised to complete e-visit.  Manju Robins RN on 12/14/2021 at 12:20 PM

## 2021-12-15 NOTE — PATIENT INSTRUCTIONS
Patient Education     Viral Bronchitis (Adult)    You have a viral bronchitis. Bronchitis is inflammation and swelling of the lining of the lungs. This is often caused by an infection. Symptoms include a dry, hacking cough that is worse at night. The cough may bring up yellow-green mucus. You may also feel short of breath or wheeze. Other symptoms may include tiredness, chest discomfort, and chills.  Bronchitis that is caused by a virus is not treated with antibiotics. Instead, medicines may be given to help relieve symptoms. Symptoms can last up to 2 weeks, although the cough may last much longer.  This illness is contagious during the first few days and is spread through the air by coughing and sneezing, or by direct contact (touching the sick person and then touching your own eyes, nose, or mouth).  Most viral illnesses resolve within 10 to 14 days with rest and simple home remedies, although they may sometimes last for several weeks.  Home care    If symptoms are severe, rest at home for the first 2 to 3 days. When you go back to your usual activities, don't let yourself get too tired.    Do not smoke. Also avoid being exposed to secondhand smoke.    You may use over-the-counter medicine to control fever or pain, unless another pain medicine was prescribed. If you have chronic liver or kidney disease or have ever had a stomach ulcer or gastrointestinal bleeding, talk with your healthcare provider before using these medicines. Also talk to your provider if you are taking medicine to prevent blood clots. Aspirin should never be given to anyone younger than 18 years of age who is ill with a viral infection or fever. It may cause severe liver or brain damage.    Your appetite may be poor, so a light diet is fine. Avoid dehydration by drinking 6 to 8 glasses of fluids per day (such as water, soft drinks, sports drinks, juices, tea, or soup). Extra fluids will help loosen secretions in the nose and  lungs.    Over-the-counter cough, cold, and sore-throat medicines will not shorten the length of the illness, but they may help to reduce symptoms. Don't use decongestants if you have high blood pressure.  Follow-up care  Follow up with your healthcare provider, or as advised. If you had an X-ray or ECG (electrocardiogram), a specialist will review it. You will be notified of any new findings that may affect your care.  If you are age 65 or older, or if you have a chronic lung disease or condition that affects your immune system, or you smoke, ask your healthcare provider about getting a pneumococcal vaccine and a yearly flu shot (influenza vaccine).  When to seek medical advice  Call your healthcare provider right away if any of these occur:    Fever of 100.4 F (38 C) or higher, or as directed by your healthcare provider    Coughing up increased amounts of colored sputum    Weakness, drowsiness, headache, facial pain, ear pain, or a stiff neck  Call 911  Call 911 if any of these occur:    Coughing up blood    Worsening weakness, drowsiness, headache, or stiff neck    Trouble breathing, wheezing, or pain with breathing  Pat[employer] last reviewed this educational content on 6/1/2018 2000-2021 [employer]. All rights reserved. This information is not intended as a substitute for professional medical care. Always follow your healthcare professional's instructions.           Patient Education     What Is Acute Bronchitis?  Acute bronchitis is when the airways in your lungs (bronchial tubes) become red and swollen (inflamed). It's usually caused by a viral infection. But it can also occur because of a bacteria or allergen. Symptoms include a cough that produces yellow or greenish mucus and can last for days or sometimes weeks.  Lungs with bronchitis  Bronchitis often occurs with a cold or the flu virus. The airways become inflamed (red and swollen). There is a deep hacking cough from the extra mucus. Other  symptoms may include:    Wheezing    Chest discomfort    Shortness of breath    Mild fever  A second infection, this time due to bacteria, may then occur. And airways irritated by allergens or smoke are more likely to get infected.  Making a diagnosis  A physical exam, health history, and certain tests help your healthcare provider make the diagnosis.  Health history  Your healthcare provider will ask you about past illnesses and your current symptoms. You will also be asked about what medicines you currently take, including over-the-counter medicines, vitamins, herbs, and supplements.  The exam  Your provider listens to your chest for signs of congestion. He or she may also check your ears, nose, and throat.  Possible tests    A sputum test for bacteria. This requires a sample of mucus from your lungs.    A nasal or throat swab. This tests to see if you have a bacterial or viral infection.    A chest X-ray. This is done if your healthcare provider thinks you have pneumonia.    Tests to check for an underlying condition. Other tests may be done to check for things such as allergies, asthma, or COPD (chronic obstructive pulmonary disease). You may need to see a specialist for more lung function testing.    Treating a cough  The main treatment for bronchitis is easing symptoms. Avoiding smoke, allergens, and other things that trigger coughing can often help. If the infection is bacterial, you may be given antibiotics. During the illness, it's important to get plenty of sleep. To ease symptoms:    Don t smoke. Also avoid secondhand smoke.    Use a humidifier. Or try breathing in steam from a hot shower. This may help loosen mucus.    Drink a lot of water and juice. They can soothe the throat and may help thin mucus.    Sit up or use extra pillows when in bed. This helps to lessen coughing and congestion.    Ask your provider about using medicine. Ask about using cough medicine, pain and fever medicine, or a  decongestant.  Antibiotics  Most cases of bronchitis are caused by cold or flu viruses. They don t need antibiotics to treat them, even if your mucus is thick and green or yellow. Antibiotics don t treat viral illness and antibiotics have not been shown to have any benefit in cases of acute bronchitis. Taking antibiotics when they are not needed increases your risk of getting an infection later that is antibiotic-resistant. Antibiotics can also cause severe cases of diarrhea that require other antibiotics to treat.  It's important that you accept your healthcare provider's advice to not use antibiotics. Your provider will prescribe antibiotics if the infection is caused by bacteria. If they are prescribed:    Take all of the medicine. Take the medicine until it's used up, even if symptoms have improved. If you don t, the bronchitis may come back.    Take the medicines as directed. For instance, some medicines should be taken with food.    Ask about side effects. Ask your provider or pharmacist what side effects are common, and what to do about them.  Follow-up care  You should see your provider again in 2 to 3 weeks. By this time, symptoms should have improved. An infection that lasts longer may mean you have a more serious problem.  Prevention    Stay away from tobacco smoke. If you smoke, quit. Stay away from smoky places. Ask friends and family not to smoke around you, or in your home or car.    Get checked for allergies.    Ask your provider about getting a yearly flu shot. Also ask about pneumococcal or pneumonia shots.    Wash your hands often. This helps reduce the chance of picking up viruses that cause colds and flu.    When to call your healthcare provider  Call your healthcare provider immediately if:    Symptoms worsen, or you have new symptoms    Breathing problems worsen    Symptoms don t get better within a week, or within 3 days of taking antibiotics  Call 911  Call 911 if you  are:    Wheezing    Feeling lightheaded or dizzy    Unable to talk    Skin or nails looks blue or pale  Inside healthy lungs    Air travels in and out of the lungs through the airways. The linings of these airways produce sticky mucus. This mucus traps particles that enter the lungs. Tiny structures called cilia then sweep the particles out of the airways.     Healthy airway: Airways are normally open. Air moves in and out easily.      Healthy cilia: Tiny, hairlike cilia sweep mucus and particles up and out of the airways.        Inflamed airway: Inflammation and extra mucus narrow the airway, causing shortness of breath.      Impaired cilia: Extra mucus impairs cilia, causing congestion and wheezing. Smoking makes the problem worse.     [employer] last reviewed this educational content on 6/1/2019 2000-2021 [employer]. All rights reserved. This information is not intended as a substitute for professional medical care. Always follow your healthcare professional's instructions.

## 2021-12-22 ENCOUNTER — HOSPITAL ENCOUNTER (EMERGENCY)
Facility: CLINIC | Age: 51
Discharge: HOME | End: 2021-12-22
Attending: EMERGENCY MEDICINE | Admitting: EMERGENCY MEDICINE
Payer: COMMERCIAL

## 2021-12-22 VITALS
WEIGHT: 200 LBS | OXYGEN SATURATION: 94 % | TEMPERATURE: 100.1 F | HEIGHT: 74 IN | SYSTOLIC BLOOD PRESSURE: 131 MMHG | RESPIRATION RATE: 18 BRPM | DIASTOLIC BLOOD PRESSURE: 77 MMHG | BODY MASS INDEX: 25.67 KG/M2 | HEART RATE: 102 BPM

## 2021-12-22 DIAGNOSIS — U07.1 COVID-19: ICD-10-CM

## 2021-12-22 PROCEDURE — 99284 EMERGENCY DEPT VISIT MOD MDM: CPT | Performed by: EMERGENCY MEDICINE

## 2021-12-22 PROCEDURE — 250N000013 HC RX MED GY IP 250 OP 250 PS 637: Performed by: EMERGENCY MEDICINE

## 2021-12-22 RX ORDER — ACETAMINOPHEN 500 MG
1000 TABLET ORAL EVERY 8 HOURS PRN
Qty: 30 TABLET | Refills: 0 | COMMUNITY
Start: 2021-12-22 | End: 2021-12-27

## 2021-12-22 RX ORDER — GUAIFENESIN/DEXTROMETHORPHAN 100-10MG/5
10 SYRUP ORAL EVERY 4 HOURS PRN
Status: DISCONTINUED | OUTPATIENT
Start: 2021-12-22 | End: 2021-12-23 | Stop reason: HOSPADM

## 2021-12-22 RX ORDER — ACETAMINOPHEN 500 MG
1000 TABLET ORAL ONCE
Status: COMPLETED | OUTPATIENT
Start: 2021-12-22 | End: 2021-12-22

## 2021-12-22 RX ORDER — IBUPROFEN 200 MG
400-800 TABLET ORAL EVERY 8 HOURS PRN
Qty: 30 TABLET | Refills: 0 | COMMUNITY
Start: 2021-12-22 | End: 2021-12-27

## 2021-12-22 RX ADMIN — GUAIFENESIN AND DEXTROMETHORPHAN 10 ML: 100; 10 SYRUP ORAL at 22:50

## 2021-12-22 RX ADMIN — ACETAMINOPHEN 1000 MG: 500 TABLET, FILM COATED ORAL at 22:49

## 2021-12-22 ASSESSMENT — ENCOUNTER SYMPTOMS
FATIGUE: 1
CONFUSION: 0
SHORTNESS OF BREATH: 0
VOMITING: 0
CHILLS: 0
SORE THROAT: 0
DIARRHEA: 1
DIAPHORESIS: 1
PALPITATIONS: 0
FEVER: 1
DYSURIA: 0
ACTIVITY CHANGE: 1
APPETITE CHANGE: 1
NECK STIFFNESS: 0
ABDOMINAL PAIN: 0
SEIZURES: 0
HEADACHES: 0
MYALGIAS: 0
NAUSEA: 0
RHINORRHEA: 0
COUGH: 1
LIGHT-HEADEDNESS: 1
BACK PAIN: 0

## 2021-12-22 ASSESSMENT — MIFFLIN-ST. JEOR: SCORE: 1821.94

## 2021-12-23 ENCOUNTER — PATIENT OUTREACH (OUTPATIENT)
Dept: CARE COORDINATION | Facility: CLINIC | Age: 51
End: 2021-12-23
Payer: COMMERCIAL

## 2021-12-23 NOTE — DISCHARGE INSTRUCTIONS
----------------------------------------------------------------------------------------------------------------------    Drinking tea or warm lemon water mixed with honey is a time-honored way to soothe a sore throat. But honey alone may be an effective cough suppressant, too.    In one study, children age 2 and older with upper respiratory tract infections were given up to 2 teaspoons (10 milliliters) of honey at bedtime. The honey seemed to reduce nighttime coughing and improve sleep.    In fact, in the study, honey appeared to be as effective as a common cough suppressant ingredient, dextromethorphan, in typical over-the-counter doses. Since honey is low-cost and widely available, it might be worth a try.    However, due to the risk of infant botulism, a rare but serious form of food poisoning, never give honey to a child younger than age 1.    And remember: Coughing isn't all bad. It helps clear mucus from your airway. If you or your child is otherwise healthy, there's usually no reason to suppress a cough    [email or web address]-answers/honey/faq-72584666        You have been infected with the COVID virus. Below is an article that provides some information on your immune response to the virus.    [email or web address]-what-you-need-to-know    Or scan this QR code below to access the article.          Alternate acetaminophen with ibuprofen every 4 hours as needed for pain or fever (example: acetaminophen at 8am, ibuprofen at 12pm, acetaminophen at 4pm, ibuprofen at 8pm, etc).  I recommended keeping a note documenting which medication you gave and the time it was given. This will help you keep track of what medication to give next.  See discharge papers or medication label for dose.

## 2021-12-23 NOTE — PROGRESS NOTES
"Care Coordination Hospital/ED Discharge Follow up Note    Hospital/ED Discharge date: 12/22/21    Reason/Diagnosis for Hospital/ED visit: COVID-19    Are you feeling better, the same, or worse since your Hospital/ED visit? Better, \"everything's better today.\"    Symptoms:     Cough -  Still coughing but not as bad    Shortness of breath:  no shortness of breath - has an albuterol inhaler he takes PRN    Chest pain:  No    Fever: No    Current temperature:  N/A    Headache:  No    Sore throat:  No    Nasal congestion:  Yes    Nausea/vomiting/diarrhea:  No    Body aches/joint pains:  No    Fatigue:  No    Home treatment measures used and outcome:  albuterol inhaler, alternating Tylenol and ibuprofen every 3 hours, rest, pushing fluids    Pulse Oximeter/Oxygen Questions:    Were you sent home with a pulse oximeter?  No, pt will purchase one today as the one he had is currently with his wife, who is in the hospital.     Are you currently utilizing the pulse oximeter?  N/A    Do you understand how to use the home oximeter?  N/A    What are your current oxygen saturation levels?  Pt does not have an oximeter, he will be purchasing one today    Oxygen saturation levels in ED/IP:  96%    Were you sent home with home oxygen?  No    Medications:    Were you prescribed any new medications?  Yes, budesonide inhaler    If yes, have you icked up these new medications?  No, picking up today    Are the medications helping?  N/A    Do you have any questions about your new medications?  No       Follow Up:    Do you have a follow up appointment scheduled with your PCP or specialist?  No, will schedule    Do you have a plan in place in the event of an emergency?  Yes    If patient is established or planning to establish primary care within [facility], Care Coordination was offered. Care Coordination accepted/declined:  Yes, declined    [employer] invitation received?  No, pt hasn't checked his email yet today, will look for " invitation    [employer] invitation accepted, pending patient activation or declined:  Pending, RN encouraged    Continue to push fluids, rest, Tylenol/ibuprofen.  Reviewed deep breathing exercises with pt.  Continue using albuterol inhaler along with budesonide inhaler      Marycarmen Jimenez RN  [facility] Care Coordination

## 2021-12-23 NOTE — ED NOTES
Is covid positive, reports when he coughs it makes him feel like it is difficult to breathe, but only when he coughs. Dry cough. Has home O2 monitor and reports when he coughs his oxygen is 92%, otherwise it is at 97% at baseline. No fevers, no chest pain, no otherwise shortness of breath. He reports it is n't shortness of breath, it just feels difficult when he coughs. Respirations even and nonlabored.

## 2021-12-23 NOTE — ED PROVIDER NOTES
History     Chief Complaint   Patient presents with     Covid Concern     when he coughs he is lightheaded.  Covid + 12/14/21, not vaccinated     HPI  Franklin Mejia is a 51 year old male with no significant contributing past medical history presenting for evaluation of of cough and congestion over the past 8 to 10 days.  Patient tested positive for Covid 8 days ago and has had an ongoing cough since.  He continues to have some mild nasal congestion which preceded his cough for several days.  Reports fevers up to 102 at home.  Has been self treating with ibuprofen and Tylenol intermittently.  Patient reports coughing fits where he will cough so much that he feels like he might vomit but has not.  Patient reports a slight scratchiness in his throat but no difficulty speaking or swallowing.  Denies difficulty breathing.  Denies nausea, vomiting, but has had regular watery diarrhea.  Still drinking regularly and urinating.  Reports he gets a little rash on his back when he sweats and has been treating this with Benadryl intermittently.  Main complaint is of a persistent cough which is made sleep difficult.  Was prescribed albuterol and Guaifenesin in the clinic and reports no significant change in symptoms with that.  Patient is a former smoker having quit about 18 years ago.    Allergies:  Allergies   Allergen Reactions     Pcn [Penicillins]        Problem List:    Patient Active Problem List    Diagnosis Date Noted     TMJ (temporomandibular joint syndrome) 09/09/2021     Priority: Medium     Nephrolithiasis 01/2020     Priority: Medium     Multiple benign nevi 07/12/2019     Priority: Medium     Vitamin D deficiency 07/12/2019     Priority: Medium     Overweight (BMI 25.0-29.9) 12/17/2015     Priority: Medium     Gastroesophageal reflux disease, esophagitis presence not specified 11/13/2013     Priority: Medium     IMO Regulatory Load OCT 2020          Past Medical History:    Past Medical History:    Diagnosis Date     Nephrolithiasis 10/2020       Past Surgical History:    Past Surgical History:   Procedure Laterality Date     APPENDECTOMY         Family History:    Family History   Problem Relation Age of Onset     Lipids Mother      Hypertension Father          copd 81     Macular Degeneration Father      Diabetes Father      Diabetes Brother      Cerebrovascular Disease No family hx of      Thyroid Disease No family hx of      Glaucoma No family hx of      Cancer No family hx of        Social History:  Marital Status:   [2]  Social History     Tobacco Use     Smoking status: Former Smoker     Quit date: 2006     Years since quitting: 15.3     Smokeless tobacco: Former User     Types: Chew     Quit date: 2018   Substance Use Topics     Alcohol use: Yes     Alcohol/week: 0.0 standard drinks     Comment: occasional      Drug use: No        Medications:    acetaminophen (TYLENOL) 500 MG tablet  budesonide (PULMICORT FLEXHALER) 180 MCG/ACT inhaler  ibuprofen (ADVIL/MOTRIN) 200 MG tablet  albuterol (PROAIR HFA/PROVENTIL HFA/VENTOLIN HFA) 108 (90 Base) MCG/ACT inhaler  guaiFENesin (MUCINEX) 600 MG 12 hr tablet          Review of Systems   Constitutional: Positive for activity change (Decreased), appetite change (Decreased), diaphoresis, fatigue and fever. Negative for chills.   HENT: Positive for congestion. Negative for hearing loss, rhinorrhea and sore throat.         Denies change in taste or smell   Respiratory: Positive for cough (dry). Negative for shortness of breath.    Cardiovascular: Negative for chest pain, palpitations and leg swelling.   Gastrointestinal: Positive for diarrhea. Negative for abdominal pain, nausea and vomiting.   Genitourinary: Negative for decreased urine volume and dysuria.   Musculoskeletal: Negative for back pain, myalgias and neck stiffness.   Skin: Positive for rash (on back).   Neurological: Positive for light-headedness (after coughing fit, rapidly  "normalizies). Negative for seizures, syncope and headaches.   Psychiatric/Behavioral: Negative for confusion.   All other systems reviewed and are negative.      Physical Exam   BP: 131/77  Pulse: 102  Temp: 100.1  F (37.8  C)  Resp: 18  Height: 188 cm (6' 2\")  Weight: 90.7 kg (200 lb)  SpO2: 96 %      Physical Exam  Vitals and nursing note reviewed.   Constitutional:       Appearance: Normal appearance. He is not ill-appearing or diaphoretic.      Comments: Sitting upright in bed speaking full sentences in no distress.  Does have frequent dry coughing fits   HENT:      Head: Atraumatic.      Nose: Congestion present.      Right Sinus: No maxillary sinus tenderness or frontal sinus tenderness.      Left Sinus: No maxillary sinus tenderness or frontal sinus tenderness.      Mouth/Throat:      Mouth: Mucous membranes are moist.   Eyes:      Conjunctiva/sclera: Conjunctivae normal.   Cardiovascular:      Rate and Rhythm: Normal rate.      Pulses: Normal pulses.      Comments: Resting heart rate in the 90s  Pulmonary:      Effort: Pulmonary effort is normal.      Breath sounds: Wheezing (Slight wheeze in the right lower lobe) and rhonchi (Small patchy rhonchi in the right lower lobe,) present. No rales.   Abdominal:      Palpations: Abdomen is soft.      Tenderness: There is no abdominal tenderness.   Musculoskeletal:         General: Normal range of motion.   Skin:     General: Skin is warm and dry.      Capillary Refill: Capillary refill takes less than 2 seconds.   Neurological:      Mental Status: He is alert and oriented to person, place, and time.   Psychiatric:         Mood and Affect: Mood normal.         ED Course                 Procedures                  No results found for this or any previous visit (from the past 24 hour(s)).    Medications   guaiFENesin-dextromethorphan (ROBITUSSIN DM) 100-10 MG/5ML syrup 10 mL (has no administration in time range)   budesonide (PULMICORT FLEXHALER) inhaler 1 puff (has " no administration in time range)   acetaminophen (TYLENOL) tablet 1,000 mg (has no administration in time range)       Assessments & Plan (with Medical Decision Making)  51-year-old former smoker presenting for evaluation of roughly 8 to 10 days of symptoms of dry cough and congestion.  Was diagnosed with Covid 8 days ago.  Continues to have some diarrhea and intermittent fevers but overall doing well.  No hypoxia (lowest oxygen at home reported to be 90% transiently with coughing fits).  Clinically patient appears to be managing the illness well.  Mildly dehydrated but tolerating oral fluids.  Provided additional treatment options for cough.  Given his former smoking history and persistent cough, trial budesonide was prescribed along with Robitussin and acetaminophen for fever     I have reviewed the nursing notes.    I have reviewed the findings, diagnosis, plan and need for follow up with the patient.       New Prescriptions    ACETAMINOPHEN (TYLENOL) 500 MG TABLET    Take 2 tablets (1,000 mg) by mouth every 8 hours as needed for fever or pain    BUDESONIDE (PULMICORT FLEXHALER) 180 MCG/ACT INHALER    Inhale 1 puff into the lungs 2 times daily for 7 days    IBUPROFEN (ADVIL/MOTRIN) 200 MG TABLET    Take 2-4 tablets (400-800 mg) by mouth every 8 hours as needed for mild pain       Final diagnoses:   COVID-19       12/22/2021   [facility] EMERGENCY DEPT     Rendon, Franklin Echavarria MD  12/22/21 0517

## 2022-01-04 ENCOUNTER — OFFICE VISIT (OUTPATIENT)
Dept: DERMATOLOGY | Facility: CLINIC | Age: 52
End: 2022-01-04
Attending: FAMILY MEDICINE
Payer: COMMERCIAL

## 2022-01-04 VITALS — DIASTOLIC BLOOD PRESSURE: 75 MMHG | SYSTOLIC BLOOD PRESSURE: 130 MMHG | OXYGEN SATURATION: 95 % | HEART RATE: 92 BPM

## 2022-01-04 DIAGNOSIS — L82.1 SEBORRHEIC KERATOSIS: Primary | ICD-10-CM

## 2022-01-04 DIAGNOSIS — D18.01 CHERRY ANGIOMA: ICD-10-CM

## 2022-01-04 DIAGNOSIS — D22.9 MULTIPLE BENIGN NEVI: ICD-10-CM

## 2022-01-04 DIAGNOSIS — L81.4 LENTIGO: ICD-10-CM

## 2022-01-04 PROCEDURE — 99203 OFFICE O/P NEW LOW 30 MIN: CPT | Performed by: PHYSICIAN ASSISTANT

## 2022-01-04 NOTE — LETTER
2022         RE: Franklin Mejia  [address]  [place] [address]        Dear Colleague,    Thank you for referring your patient, Franklin Mejia, to the [facility]. Please see a copy of my visit note below.    Franklin Mejia is an extremely pleasant 51 year old year old male patient here today for mole check. He notes some moles have become more raised throughout the year. No painful or bleeding skin lesions.  Patient has no other skin complaints today.  Remainder of the HPI, Meds, PMH, Allergies, FH, and SH was reviewed in chart.    Pertinent Hx:   No personal history of skin cancer.   Past Medical History:   Diagnosis Date     Nephrolithiasis 10/2020       Past Surgical History:   Procedure Laterality Date     APPENDECTOMY          Family History   Problem Relation Age of Onset     Lipids Mother      Hypertension Father          copd 81     Macular Degeneration Father      Diabetes Father      Diabetes Brother      Cerebrovascular Disease No family hx of      Thyroid Disease No family hx of      Glaucoma No family hx of      Cancer No family hx of        Social History     Socioeconomic History     Marital status:      Spouse name: Not on file     Number of children: Not on file     Years of education: Not on file     Highest education level: Not on file   Occupational History     Not on file   Tobacco Use     Smoking status: Former Smoker     Quit date: 2006     Years since quitting: 15.3     Smokeless tobacco: Former User     Types: Chew     Quit date: 2018   Substance and Sexual Activity     Alcohol use: Yes     Alcohol/week: 0.0 standard drinks     Comment: occasional      Drug use: No     Sexual activity: Yes     Partners: Female   Other Topics Concern     Parent/sibling w/ CABG, MI or angioplasty before 65F 55M? No   Social History Narrative     Not on file     Social Determinants of Health     Financial Resource Strain: Not on  file   Food Insecurity: Not on file   Transportation Needs: Not on file   Physical Activity: Not on file   Stress: Not on file   Social Connections: Not on file   Intimate Partner Violence: Not on file   Housing Stability: Not on file       Outpatient Encounter Medications as of 1/4/2022   Medication Sig Dispense Refill     albuterol (PROAIR HFA/PROVENTIL HFA/VENTOLIN HFA) 108 (90 Base) MCG/ACT inhaler Inhale 1-2 puffs into the lungs every 4 hours as needed for shortness of breath / dyspnea or wheezing (cough) (Patient not taking: Reported on 1/4/2022) 18 g 0     budesonide (PULMICORT FLEXHALER) 180 MCG/ACT inhaler Inhale 1 puff into the lungs 2 times daily for 7 days 1 each 0     guaiFENesin (MUCINEX) 600 MG 12 hr tablet Take 2 tablets (1,200 mg) by mouth 2 times daily as needed for congestion (Patient not taking: Reported on 1/4/2022) 40 tablet 0     No facility-administered encounter medications on file as of 1/4/2022.             O:   NAD, WDWN, Alert & Oriented, Mood & Affect wnl, Vitals stable   Here today alone   /75 (BP Location: Right arm, Patient Position: Sitting, Cuff Size: Adult Regular)   Pulse 92   SpO2 95%    General appearance normal   Vitals stable   Alert, oriented and in no acute distress     Stuck on papules and brown macules on trunk and ext   Red papules on trunk  Brown papules and macules with regular pigment network and borders on torso and extremities     The remainder of skin exam is normal       Eyes: Conjunctivae/lids:Normal     ENT: Lips: normal    MSK:Normal    Cardiovascular: peripheral edema none    Pulm: Breathing Normal    Neuro/Psych: Orientation:Alert and Orientedx3 ; Mood/Affect:normal   A/P:  1. Seborrheic keratosis, lentigo, angioma, benign nevi   BENIGN LESIONS DISCUSSED WITH PATIENT:  I discussed the specifics of tumor, prognosis, and genetics of benign lesions.  I explained that treatment of these lesions would be purely cosmetic and not medically neccessary.  I  discussed with patient different removal options including excision, cautery and /or laser.      Nature and genetics of benign skin lesions dicussed with patient.  Signs and Symptoms of skin cancer discussed with patient.  ABCDEs of melanoma reviewed with patient.  Patient encouraged to perform monthly skin exams.  UV precautions reviewed with patient.  Risks of non-melanoma skin cancer discussed with patient   Return to clinic in one year or sooner if needed.          Again, thank you for allowing me to participate in the care of your patient.        Sincerely,        Gisel Decker PA-C

## 2022-01-04 NOTE — NURSING NOTE
Chief Complaint   Patient presents with     Skin Check     mole check- has changing mole        Vitals:    01/04/22 1442   BP: 130/75   BP Location: Right arm   Patient Position: Sitting   Cuff Size: Adult Regular   Pulse: 92   SpO2: 95%     Wt Readings from Last 1 Encounters:   12/22/21 90.7 kg (200 lb)       Linda Patel LPN .................1/4/2022

## 2022-01-07 ENCOUNTER — HOSPITAL ENCOUNTER (EMERGENCY)
Facility: CLINIC | Age: 52
Discharge: HOME | End: 2022-01-07
Attending: PHYSICIAN ASSISTANT | Admitting: PHYSICIAN ASSISTANT
Payer: COMMERCIAL

## 2022-01-07 ENCOUNTER — APPOINTMENT (OUTPATIENT)
Dept: CT IMAGING | Facility: CLINIC | Age: 52
End: 2022-01-07
Attending: PHYSICIAN ASSISTANT
Payer: COMMERCIAL

## 2022-01-07 VITALS
DIASTOLIC BLOOD PRESSURE: 66 MMHG | HEART RATE: 87 BPM | RESPIRATION RATE: 18 BRPM | WEIGHT: 194 LBS | BODY MASS INDEX: 24.9 KG/M2 | HEIGHT: 74 IN | OXYGEN SATURATION: 99 % | SYSTOLIC BLOOD PRESSURE: 122 MMHG | TEMPERATURE: 97.9 F

## 2022-01-07 DIAGNOSIS — R05.9 COUGH: ICD-10-CM

## 2022-01-07 DIAGNOSIS — R79.89 ELEVATED PLATELET COUNT: ICD-10-CM

## 2022-01-07 DIAGNOSIS — R07.1 PAINFUL BREATHING: ICD-10-CM

## 2022-01-07 LAB
ALBUMIN SERPL-MCNC: 3.2 G/DL (ref 3.4–5)
ALP SERPL-CCNC: 118 U/L (ref 40–150)
ALT SERPL W P-5'-P-CCNC: 75 U/L (ref 0–70)
ANION GAP SERPL CALCULATED.3IONS-SCNC: 4 MMOL/L (ref 3–14)
AST SERPL W P-5'-P-CCNC: 20 U/L (ref 0–45)
BASOPHILS # BLD AUTO: 0.1 10E3/UL (ref 0–0.2)
BASOPHILS NFR BLD AUTO: 1 %
BILIRUB DIRECT SERPL-MCNC: 0.1 MG/DL (ref 0–0.2)
BILIRUB SERPL-MCNC: 0.3 MG/DL (ref 0.2–1.3)
BUN SERPL-MCNC: 14 MG/DL (ref 7–30)
CALCIUM SERPL-MCNC: 10.9 MG/DL (ref 8.5–10.1)
CHLORIDE BLD-SCNC: 110 MMOL/L (ref 94–109)
CO2 SERPL-SCNC: 28 MMOL/L (ref 20–32)
CREAT SERPL-MCNC: 0.85 MG/DL (ref 0.66–1.25)
D DIMER PPP FEU-MCNC: 0.66 UG/ML FEU (ref 0–0.5)
EOSINOPHIL # BLD AUTO: 0.3 10E3/UL (ref 0–0.7)
EOSINOPHIL NFR BLD AUTO: 2 %
ERYTHROCYTE [DISTWIDTH] IN BLOOD BY AUTOMATED COUNT: 14.5 % (ref 10–15)
GFR SERPL CREATININE-BSD FRML MDRD: >90 ML/MIN/1.73M2
GLUCOSE BLD-MCNC: 98 MG/DL (ref 70–99)
HCT VFR BLD AUTO: 43.1 % (ref 40–53)
HGB BLD-MCNC: 13.7 G/DL (ref 13.3–17.7)
IMM GRANULOCYTES # BLD: 0 10E3/UL
IMM GRANULOCYTES NFR BLD: 0 %
LYMPHOCYTES # BLD AUTO: 2.2 10E3/UL (ref 0.8–5.3)
LYMPHOCYTES NFR BLD AUTO: 21 %
MCH RBC QN AUTO: 27.2 PG (ref 26.5–33)
MCHC RBC AUTO-ENTMCNC: 31.8 G/DL (ref 31.5–36.5)
MCV RBC AUTO: 86 FL (ref 78–100)
MONOCYTES # BLD AUTO: 0.9 10E3/UL (ref 0–1.3)
MONOCYTES NFR BLD AUTO: 8 %
NEUTROPHILS # BLD AUTO: 7.1 10E3/UL (ref 1.6–8.3)
NEUTROPHILS NFR BLD AUTO: 68 %
NRBC # BLD AUTO: 0 10E3/UL
NRBC BLD AUTO-RTO: 0 /100
PLATELET # BLD AUTO: 474 10E3/UL (ref 150–450)
POTASSIUM BLD-SCNC: 4.1 MMOL/L (ref 3.4–5.3)
PROT SERPL-MCNC: 8 G/DL (ref 6.8–8.8)
RBC # BLD AUTO: 5.04 10E6/UL (ref 4.4–5.9)
SODIUM SERPL-SCNC: 142 MMOL/L (ref 133–144)
WBC # BLD AUTO: 10.6 10E3/UL (ref 4–11)

## 2022-01-07 PROCEDURE — 99284 EMERGENCY DEPT VISIT MOD MDM: CPT | Performed by: PHYSICIAN ASSISTANT

## 2022-01-07 PROCEDURE — 85379 FIBRIN DEGRADATION QUANT: CPT | Performed by: PHYSICIAN ASSISTANT

## 2022-01-07 PROCEDURE — 250N000009 HC RX 250: Performed by: PHYSICIAN ASSISTANT

## 2022-01-07 PROCEDURE — 250N000011 HC RX IP 250 OP 636: Performed by: PHYSICIAN ASSISTANT

## 2022-01-07 PROCEDURE — 71275 CT ANGIOGRAPHY CHEST: CPT

## 2022-01-07 PROCEDURE — 82248 BILIRUBIN DIRECT: CPT | Performed by: PHYSICIAN ASSISTANT

## 2022-01-07 PROCEDURE — 85025 COMPLETE CBC W/AUTO DIFF WBC: CPT | Performed by: PHYSICIAN ASSISTANT

## 2022-01-07 PROCEDURE — 82310 ASSAY OF CALCIUM: CPT | Performed by: PHYSICIAN ASSISTANT

## 2022-01-07 PROCEDURE — 99285 EMERGENCY DEPT VISIT HI MDM: CPT | Mod: 25 | Performed by: PHYSICIAN ASSISTANT

## 2022-01-07 PROCEDURE — 36415 COLL VENOUS BLD VENIPUNCTURE: CPT | Performed by: PHYSICIAN ASSISTANT

## 2022-01-07 RX ORDER — DOXYCYCLINE 100 MG/1
100 CAPSULE ORAL 2 TIMES DAILY
Qty: 20 CAPSULE | Refills: 0 | Status: SHIPPED | OUTPATIENT
Start: 2022-01-07 | End: 2022-01-17

## 2022-01-07 RX ORDER — IOPAMIDOL 755 MG/ML
82 INJECTION, SOLUTION INTRAVASCULAR ONCE
Status: COMPLETED | OUTPATIENT
Start: 2022-01-07 | End: 2022-01-07

## 2022-01-07 RX ADMIN — IOPAMIDOL 82 ML: 755 INJECTION, SOLUTION INTRAVENOUS at 15:12

## 2022-01-07 RX ADMIN — SODIUM CHLORIDE 100 ML: 9 INJECTION, SOLUTION INTRAVENOUS at 15:12

## 2022-01-07 ASSESSMENT — ENCOUNTER SYMPTOMS
GASTROINTESTINAL NEGATIVE: 1
APPETITE CHANGE: 0
MUSCULOSKELETAL NEGATIVE: 1
NEUROLOGICAL NEGATIVE: 1
WEAKNESS: 0
WHEEZING: 0
CONFUSION: 0
ACTIVITY CHANGE: 0
EYES NEGATIVE: 1
FATIGUE: 0
WOUND: 0
CHEST TIGHTNESS: 0
SHORTNESS OF BREATH: 1
FEVER: 0
PSYCHIATRIC NEGATIVE: 1
COUGH: 1
COLOR CHANGE: 0
CONSTITUTIONAL NEGATIVE: 1
PALPITATIONS: 0
STRIDOR: 0
HEADACHES: 0

## 2022-01-07 ASSESSMENT — MIFFLIN-ST. JEOR: SCORE: 1794.73

## 2022-01-07 NOTE — Clinical Note
Franklin Mejia was seen and treated in our emergency department on 1/7/2022.  He may return to work on 01/10/2022.        If you have any questions or concerns, please don't hesitate to call.      Malia Jenkins PA-C

## 2022-01-07 NOTE — DISCHARGE INSTRUCTIONS
Tylenol and ibuprofen over-the-counter as needed for pain.    Use doxycycline as directed to cover for possible early secondary pneumonia after possible COVID-19 infection 4 weeks ago.     Return to the emergency department if shortness of breath, chest pain, calf pain or swelling racing or skipping beats, fevers or chills, change or worsening of symptoms occur.    Follow-up with primary care doctor for recheck in 1 week for recheck of elevated platelets that can be seen with covid 19 illness.

## 2022-01-07 NOTE — ED PROVIDER NOTES
History     Chief Complaint   Patient presents with     Respiratory Problems     HPI  Franklin Mejia is a 51 year old male who presents today with right sided lung pain with breathing that has progressively worsened over the past week. Patient states he was diagnosed with covid 19 about 4 weeks ago and was told if symptoms changed, shortness of breath, or painful breathing occurred to come back and get a chest x-ray.  At that time he did not require hospitalization or oxygen supplementation.  He is not vaccinated against COVID-19 and this is a second round of having covid 19.  He states he will get vaccinated once he is feeling better. He states covid symptoms were improving, but now he noticed some increased cough, painful breathing, and some tachycardia. Patient denies fevers, chills, chest pain, palpitations, abdominal pain, nausea or vomiting, back pain, runny nose congestion, sore throat, ear pain, vomiting or diarrhea, rash.  No calf pain or swelling.  He states he quit smoking years ago and no history of blood clots in the past.     Allergies:  Allergies   Allergen Reactions     Pcn [Penicillins]        Problem List:    Patient Active Problem List    Diagnosis Date Noted     TMJ (temporomandibular joint syndrome) 09/09/2021     Priority: Medium     Nephrolithiasis 01/2020     Priority: Medium     Multiple benign nevi 07/12/2019     Priority: Medium     Vitamin D deficiency 07/12/2019     Priority: Medium     Overweight (BMI 25.0-29.9) 12/17/2015     Priority: Medium     Gastroesophageal reflux disease, esophagitis presence not specified 11/13/2013     Priority: Medium     IMO Regulatory Load OCT 2020          Past Medical History:    Past Medical History:   Diagnosis Date     Nephrolithiasis 10/2020       Past Surgical History:    Past Surgical History:   Procedure Laterality Date     APPENDECTOMY  1983       Family History:    Family History   Problem Relation Age of Onset     Lipids Mother       "Hypertension Father          copd 81     Macular Degeneration Father      Diabetes Father      Diabetes Brother      Cerebrovascular Disease No family hx of      Thyroid Disease No family hx of      Glaucoma No family hx of      Cancer No family hx of        Social History:  Marital Status:   [2]  Social History     Tobacco Use     Smoking status: Former Smoker     Quit date: 2006     Years since quitting: 15.3     Smokeless tobacco: Former User     Types: Chew     Quit date: 2018   Substance Use Topics     Alcohol use: Yes     Alcohol/week: 0.0 standard drinks     Comment: occasional      Drug use: No        Medications:    doxycycline hyclate (VIBRAMYCIN) 100 MG capsule  albuterol (PROAIR HFA/PROVENTIL HFA/VENTOLIN HFA) 108 (90 Base) MCG/ACT inhaler  budesonide (PULMICORT FLEXHALER) 180 MCG/ACT inhaler  guaiFENesin (MUCINEX) 600 MG 12 hr tablet          Review of Systems   Constitutional: Negative.  Negative for activity change, appetite change, fatigue and fever.   HENT: Negative.    Eyes: Negative.    Respiratory: Positive for cough and shortness of breath. Negative for chest tightness, wheezing and stridor.         Painful breathing on right    Cardiovascular: Negative for chest pain, palpitations and leg swelling.        At times some tachycardia noticed.    Gastrointestinal: Negative.    Genitourinary: Negative.    Musculoskeletal: Negative.    Skin: Negative.  Negative for color change, rash and wound.   Neurological: Negative.  Negative for weakness and headaches.   Psychiatric/Behavioral: Negative.  Negative for confusion.   All other systems reviewed and are negative.      Physical Exam   BP: 122/66  Pulse: 100  Temp: 97.9  F (36.6  C)  Resp: 18  Height: 188 cm (6' 2\")  Weight: 88 kg (194 lb)  SpO2: 97 %      Physical Exam  Vitals and nursing note reviewed.   Constitutional:       General: He is not in acute distress.     Appearance: Normal appearance. He is normal weight. He is not " toxic-appearing.   HENT:      Head: Normocephalic.   Eyes:      General: No scleral icterus.     Extraocular Movements: Extraocular movements intact.      Conjunctiva/sclera: Conjunctivae normal.      Pupils: Pupils are equal, round, and reactive to light.   Cardiovascular:      Rate and Rhythm: Regular rhythm. Tachycardia present.      Pulses: Normal pulses.      Heart sounds: Normal heart sounds. No murmur heard.  No friction rub. No gallop.    Pulmonary:      Effort: Pulmonary effort is normal. No respiratory distress.      Breath sounds: Normal breath sounds. No stridor. No wheezing, rhonchi or rales.   Chest:      Chest wall: No tenderness.   Abdominal:      General: Bowel sounds are normal. There is no distension.      Palpations: Abdomen is soft.      Tenderness: There is no abdominal tenderness. There is no guarding or rebound.   Musculoskeletal:         General: No swelling or tenderness. Normal range of motion.      Cervical back: Normal range of motion and neck supple. No tenderness.      Right lower leg: No edema.      Left lower leg: No edema.   Lymphadenopathy:      Cervical: No cervical adenopathy.   Skin:     General: Skin is warm.      Capillary Refill: Capillary refill takes less than 2 seconds.      Findings: No bruising, erythema or rash.   Neurological:      General: No focal deficit present.      Mental Status: He is alert and oriented to person, place, and time.      Sensory: No sensory deficit.      Motor: No weakness.      Gait: Gait normal.   Psychiatric:         Mood and Affect: Mood normal.         Behavior: Behavior normal.         Thought Content: Thought content normal.         Judgment: Judgment normal.         ED Course                 Procedures             Critical Care time:  none               Results for orders placed or performed during the hospital encounter of 01/07/22 (from the past 24 hour(s))   D dimer quantitative   Result Value Ref Range    D-Dimer Quantitative 0.66 (H)  0.00 - 0.50 ug/mL FEU    Narrative    This D-dimer assay is intended for use in conjunction with a clinical pretest probability assessment model to exclude pulmonary embolism (PE) and deep venous thrombosis (DVT) in outpatients suspected of PE or DVT. The cut-off value is 0.50 ug/mL FEU.   CBC with platelets differential    Narrative    The following orders were created for panel order CBC with platelets differential.  Procedure                               Abnormality         Status                     ---------                               -----------         ------                     CBC with platelets and d...[[id number]]  Abnormal            Final result                 Please view results for these tests on the individual orders.   Basic metabolic panel   Result Value Ref Range    Sodium 142 133 - 144 mmol/L    Potassium 4.1 3.4 - 5.3 mmol/L    Chloride 110 (H) 94 - 109 mmol/L    Carbon Dioxide (CO2) 28 20 - 32 mmol/L    Anion Gap 4 3 - 14 mmol/L    Urea Nitrogen 14 7 - 30 mg/dL    Creatinine 0.85 0.66 - 1.25 mg/dL    Calcium 10.9 (H) 8.5 - 10.1 mg/dL    Glucose 98 70 - 99 mg/dL    GFR Estimate >90 >60 mL/min/1.73m2   CBC with platelets and differential   Result Value Ref Range    WBC Count 10.6 4.0 - 11.0 10e3/uL    RBC Count 5.04 4.40 - 5.90 10e6/uL    Hemoglobin 13.7 13.3 - 17.7 g/dL    Hematocrit 43.1 40.0 - 53.0 %    MCV 86 78 - 100 fL    MCH 27.2 26.5 - 33.0 pg    MCHC 31.8 31.5 - 36.5 g/dL    RDW 14.5 10.0 - 15.0 %    Platelet Count 474 (H) 150 - 450 10e3/uL    % Neutrophils 68 %    % Lymphocytes 21 %    % Monocytes 8 %    % Eosinophils 2 %    % Basophils 1 %    % Immature Granulocytes 0 %    NRBCs per 100 WBC 0 <1 /100    Absolute Neutrophils 7.1 1.6 - 8.3 10e3/uL    Absolute Lymphocytes 2.2 0.8 - 5.3 10e3/uL    Absolute Monocytes 0.9 0.0 - 1.3 10e3/uL    Absolute Eosinophils 0.3 0.0 - 0.7 10e3/uL    Absolute Basophils 0.1 0.0 - 0.2 10e3/uL    Absolute Immature Granulocytes 0.0 <=0.4 10e3/uL     Absolute NRBCs 0.0 10e3/uL   Hepatic panel   Result Value Ref Range    Bilirubin Total 0.3 0.2 - 1.3 mg/dL    Bilirubin Direct 0.1 0.0 - 0.2 mg/dL    Protein Total 8.0 6.8 - 8.8 g/dL    Albumin 3.2 (L) 3.4 - 5.0 g/dL    Alkaline Phosphatase 118 40 - 150 U/L    AST 20 0 - 45 U/L    ALT 75 (H) 0 - 70 U/L   CT Chest Pulmonary Embolism w Contrast    Narrative    CT CHEST PULMONARY EMBOLISM WITH CONTRAST  1/7/2022 3:20 PM    HISTORY:  PE suspected, low/intermediate probability, positive  D-dimer. Patient positive for COVID four weeks ago with right painful  breathing, tachycardia, and slight cough over past week.    TECHNIQUE: Scans obtained from the apices through the diaphragm with  IV contrast. 82 mL Isovue 370 IV injected. Radiation dose for this  scan was reduced using automated exposure control, adjustment of the  mA and/or kV according to patient size, or iterative reconstruction  technique.    COMPARISON:  Chest x-ray on 12/14/2021    FINDINGS:  Chest/mediastinum: No evidence of pulmonary embolism. No cardiomegaly  or significant pericardial effusion. Multiple prominent mediastinal  and hilar lymph nodes, likely reactive.    Lungs and pleura: No pleural effusion or pneumothorax. Bilateral  basilar and subpleural predominant patchy consolidative pulmonary  opacities, worrisome for infection including atypical infection like  COVID-19.    Upper abdomen: Limited evaluation of the upper abdomen due to lack of  coverage and timing of contrast. Cholelithiasis without CT evidence of  acute cholecystitis.    Bones and soft tissue: No pleural effusion or pneumothorax.      Impression    IMPRESSION:   1. No evidence of pulmonary embolism.  2. Bilateral basilar and subpleural predominant patchy pulmonary  opacities, worrisome for infection including atypical infection like  COVID-19.  3. Cholelithiasis without CT evidence of acute cholecystitis.    IKER CALLAWAY MD         SYSTEM ID:  [id number]       Medications    iopamidol (ISOVUE-370) solution 82 mL (82 mLs Intravenous Given 1/7/22 1512)   sodium chloride 0.9 % bag 500mL for CT scan flush use (100 mLs Intravenous Given 1/7/22 1512)       Assessments & Plan (with Medical Decision Making)     I have reviewed the nursing notes.    I have reviewed the findings, diagnosis, plan and need for follow up with the patient.    Franklin Mejia is a 51 year old male who presents today with right sided lung pain with breathing that has progressively worsened over the past week. Patient states he was diagnosed with covid 19 about 4 weeks ago and was told if symptoms changed, shortness of breath, or painful breathing occurred to come back and get a chest x-ray.  At that time he did not require hospitalization or oxygen supplementation.  He is not vaccinated against COVID-19 and this is a second round of having covid 19.  He states he will get vaccinated once he is feeling better. He states covid symptoms were improving, but now he noticed some increased cough, painful breathing, and some tachycardia. Patient denies fevers, chills, chest pain, palpitations, abdominal pain, nausea or vomiting, back pain, runny nose congestion, sore throat, ear pain, vomiting or diarrhea, rash.  No calf pain or swelling.  He states he quit smoking years ago and no history of blood clots in the past.     See exam findings above.  Patient initially slightly tachycardic on exam therefore CBC, D-dimer and comprehensive metabolic panel obtained which showed a slightly elevated platelets, normal white blood cells, slightly elevated ALT, and elevated D-dimer.  Due to patient's Covid diagnosis 4 weeks ago and elevated D-dimer with worsening symptoms over the past week CT scan of the chest obtained to rule out pulmonary embolism.  CT was negative for pulmonary embolism, bilateral basilar and subpleural predominant patchy pulmonary opacities worrisome for infection including atypical infection likely COVID-19.   Cholelithiasis without CT evidence of acute cholecystitis also noted on CT scan.  Discussed CT images and results with patient.  I did inform patient that these basilar opacities could still be related to viral COVID-19 but due to the fact that patient symptoms seem to be worsening over the past week and he was initially diagnosed with COVID-19 4 weeks ago will cover with doxycycline for possible early bacterial infection.  Patient in agreement this plan and recommend that he follow-up with primary care doctor for recheck in 1week of his platelet levels and liver functions as well as symptoms.  Patient to return sooner if symptoms worsen or change.  Vitals were rechecked and pulse was within normal limits at time of discharge.  Patient in agreement this plan and discharged in stable condition.  He states once he is feeling better he is planning to get his Covid vaccines.      Discharge Medication List as of 1/7/2022  3:47 PM      START taking these medications    Details   doxycycline hyclate (VIBRAMYCIN) 100 MG capsule Take 1 capsule (100 mg) by mouth 2 times daily for 10 days, Disp-20 capsule, R-0, E-Prescribe             Final diagnoses:   Cough - with bibasilar opacities (covid 19 vs secondary bacterial pneumonia) will cover for early secondary pneumonia   Painful breathing   Elevated platelet count       1/7/2022   [facility] EMERGENCY DEPT     Malia Jenkins PA-C  01/07/22 0257

## 2022-01-07 NOTE — ED TRIAGE NOTES
Patient reports he had COVID ~ 4 weeks ago. Over the last week he reports increased right lung pain. Reports he was told to come in for XRAY if SOB or pain on right started. Reports pain 1/10, worse with coughing. No other complaints. Is COVID vaccinated.

## 2022-01-10 ENCOUNTER — PATIENT OUTREACH (OUTPATIENT)
Dept: FAMILY MEDICINE | Facility: CLINIC | Age: 52
End: 2022-01-10
Payer: COMMERCIAL

## 2022-01-10 NOTE — PROGRESS NOTES
Franklin Mejia is an extremely pleasant 51 year old year old male patient here today for mole check. He notes some moles have become more raised throughout the year. No painful or bleeding skin lesions.  Patient has no other skin complaints today.  Remainder of the HPI, Meds, PMH, Allergies, FH, and SH was reviewed in chart.    Pertinent Hx:   No personal history of skin cancer.   Past Medical History:   Diagnosis Date     Nephrolithiasis 10/2020       Past Surgical History:   Procedure Laterality Date     APPENDECTOMY          Family History   Problem Relation Age of Onset     Lipids Mother      Hypertension Father          copd 81     Macular Degeneration Father      Diabetes Father      Diabetes Brother      Cerebrovascular Disease No family hx of      Thyroid Disease No family hx of      Glaucoma No family hx of      Cancer No family hx of        Social History     Socioeconomic History     Marital status:      Spouse name: Not on file     Number of children: Not on file     Years of education: Not on file     Highest education level: Not on file   Occupational History     Not on file   Tobacco Use     Smoking status: Former Smoker     Quit date: 2006     Years since quitting: 15.3     Smokeless tobacco: Former User     Types: Chew     Quit date: 2018   Substance and Sexual Activity     Alcohol use: Yes     Alcohol/week: 0.0 standard drinks     Comment: occasional      Drug use: No     Sexual activity: Yes     Partners: Female   Other Topics Concern     Parent/sibling w/ CABG, MI or angioplasty before 65F 55M? No   Social History Narrative     Not on file     Social Determinants of Health     Financial Resource Strain: Not on file   Food Insecurity: Not on file   Transportation Needs: Not on file   Physical Activity: Not on file   Stress: Not on file   Social Connections: Not on file   Intimate Partner Violence: Not on file   Housing Stability: Not on file       Outpatient  Encounter Medications as of 1/4/2022   Medication Sig Dispense Refill     albuterol (PROAIR HFA/PROVENTIL HFA/VENTOLIN HFA) 108 (90 Base) MCG/ACT inhaler Inhale 1-2 puffs into the lungs every 4 hours as needed for shortness of breath / dyspnea or wheezing (cough) (Patient not taking: Reported on 1/4/2022) 18 g 0     budesonide (PULMICORT FLEXHALER) 180 MCG/ACT inhaler Inhale 1 puff into the lungs 2 times daily for 7 days 1 each 0     guaiFENesin (MUCINEX) 600 MG 12 hr tablet Take 2 tablets (1,200 mg) by mouth 2 times daily as needed for congestion (Patient not taking: Reported on 1/4/2022) 40 tablet 0     No facility-administered encounter medications on file as of 1/4/2022.             O:   NAD, WDWN, Alert & Oriented, Mood & Affect wnl, Vitals stable   Here today alone   /75 (BP Location: Right arm, Patient Position: Sitting, Cuff Size: Adult Regular)   Pulse 92   SpO2 95%    General appearance normal   Vitals stable   Alert, oriented and in no acute distress     Stuck on papules and brown macules on trunk and ext   Red papules on trunk  Brown papules and macules with regular pigment network and borders on torso and extremities     The remainder of skin exam is normal       Eyes: Conjunctivae/lids:Normal     ENT: Lips: normal    MSK:Normal    Cardiovascular: peripheral edema none    Pulm: Breathing Normal    Neuro/Psych: Orientation:Alert and Orientedx3 ; Mood/Affect:normal   A/P:  1. Seborrheic keratosis, lentigo, angioma, benign nevi   BENIGN LESIONS DISCUSSED WITH PATIENT:  I discussed the specifics of tumor, prognosis, and genetics of benign lesions.  I explained that treatment of these lesions would be purely cosmetic and not medically neccessary.  I discussed with patient different removal options including excision, cautery and /or laser.      Nature and genetics of benign skin lesions dicussed with patient.  Signs and Symptoms of skin cancer discussed with patient.  ABCDEs of melanoma reviewed with  patient.  Patient encouraged to perform monthly skin exams.  UV precautions reviewed with patient.  Risks of non-melanoma skin cancer discussed with patient   Return to clinic in one year or sooner if needed.

## 2022-01-10 NOTE — TELEPHONE ENCOUNTER
"This patient was discharged from [facility] on 1/7/2022    Discharge Diagnosis: Cough, respiratory problems    Has a follow-up visit has been scheduled? No. \"Follow-up with primary care doctor for recheck in 1 week for recheck of  elevated platelets that can be seen with covid 19 illness.\"    Please follow-up with patient    Socorro Cameron RN  [facility]    "

## 2022-01-10 NOTE — TELEPHONE ENCOUNTER
ED / Discharge Outreach Protocol    Patient Contact    Attempt # 1    Was call answered?  No.  Left message on voicemail with information to call me back.    Socorro Cameron RN  [facility]

## 2022-03-16 ENCOUNTER — OFFICE VISIT (OUTPATIENT)
Dept: FAMILY MEDICINE | Facility: CLINIC | Age: 52
End: 2022-03-16
Payer: COMMERCIAL

## 2022-03-16 VITALS
TEMPERATURE: 98.5 F | SYSTOLIC BLOOD PRESSURE: 123 MMHG | RESPIRATION RATE: 16 BRPM | HEIGHT: 74 IN | DIASTOLIC BLOOD PRESSURE: 75 MMHG | BODY MASS INDEX: 27.4 KG/M2 | OXYGEN SATURATION: 99 % | HEART RATE: 77 BPM | WEIGHT: 213.5 LBS

## 2022-03-16 DIAGNOSIS — K21.9 GASTROESOPHAGEAL REFLUX DISEASE, UNSPECIFIED WHETHER ESOPHAGITIS PRESENT: ICD-10-CM

## 2022-03-16 DIAGNOSIS — K80.20 CALCULUS OF GALLBLADDER WITHOUT CHOLECYSTITIS WITHOUT OBSTRUCTION: ICD-10-CM

## 2022-03-16 DIAGNOSIS — E83.52 HYPERCALCEMIA: Primary | ICD-10-CM

## 2022-03-16 PROCEDURE — 99214 OFFICE O/P EST MOD 30 MIN: CPT | Performed by: FAMILY MEDICINE

## 2022-03-16 PROCEDURE — 83970 ASSAY OF PARATHORMONE: CPT | Performed by: FAMILY MEDICINE

## 2022-03-16 PROCEDURE — 36415 COLL VENOUS BLD VENIPUNCTURE: CPT | Performed by: FAMILY MEDICINE

## 2022-03-16 PROCEDURE — 80048 BASIC METABOLIC PNL TOTAL CA: CPT | Performed by: FAMILY MEDICINE

## 2022-03-16 NOTE — PROGRESS NOTES
Assessment & Plan       ICD-10-CM    1. Hypercalcemia  E83.52 Basic metabolic panel  (Ca, Cl, CO2, Creat, Gluc, K, Na, BUN)     Parathyroid Hormone Intact     Basic metabolic panel  (Ca, Cl, CO2, Creat, Gluc, K, Na, BUN)     Parathyroid Hormone Intact   2. Calculus of gallbladder without cholecystitis without obstruction  K80.20    3. Gastroesophageal reflux disease, unspecified whether esophagitis present  K21.9      We discussed his mildly elevated calcium and chloride levels and we will check a repeat BMP today as well as a PTH to help rule out hyperparathyroidism as a cause for that  Discussed asymptomatic cholelithiasis and we will just follow that for now  Continue with conservative measures for GERD including use of an antacid, H2 blocker, or PPI as needed    Return for a follow up as needed/desired.    Branden Esquivel MD  [facility] JAS Dozier is a 51 year old who presents for the following health issues  accompanied by his none.    HPI       F/U CT and lab results from 1/7/22.     He had respiratory symptoms back in January and was seen in a hospital setting.  He had a CT scan done to rule out pulmonary emboli.  It was negative for that, but he was found to have cholelithiasis.  He also had lab work done which showed a mildly elevated chloride level and calcium level.  He has had elevated levels of these values in the past as well.  He did have nephrolithiasis a year and a half ago.  His gallstones have generally been asymptomatic.  He has had some mild reflux symptoms in the upper chest and throat area.  He had taken omeprazole in the past, but is currently just using Tums.    Patient Active Problem List   Diagnosis     Gastroesophageal reflux disease, esophagitis presence not specified     Overweight (BMI 25.0-29.9)     Multiple benign nevi     Vitamin D deficiency     Nephrolithiasis     TMJ (temporomandibular joint syndrome)     Current Outpatient Medications  "  Medication     budesonide (PULMICORT FLEXHALER) 180 MCG/ACT inhaler     No current facility-administered medications for this visit.         Review of Systems   Noncontributory except as above      Objective    /75   Pulse 77   Temp 98.5  F (36.9  C) (Oral)   Resp 16   Ht 1.88 m (6' 2\")   Wt 96.8 kg (213 lb 8 oz)   SpO2 99%   BMI 27.41 kg/m    Body mass index is 27.41 kg/m .  Physical Exam   GENERAL: healthy, alert and no distress  ABDOMEN: soft, nontender, no hepatosplenomegaly, no masses     Past CT results and lab results from a couple of months ago as well as prior lab results were reviewed            "

## 2022-03-17 DIAGNOSIS — E55.9 VITAMIN D DEFICIENCY: ICD-10-CM

## 2022-03-17 DIAGNOSIS — E21.3 HYPERPARATHYROIDISM (H): Primary | ICD-10-CM

## 2022-03-17 LAB
ANION GAP SERPL CALCULATED.3IONS-SCNC: 4 MMOL/L (ref 3–14)
BUN SERPL-MCNC: 18 MG/DL (ref 7–30)
CALCIUM SERPL-MCNC: 11 MG/DL (ref 8.5–10.1)
CHLORIDE BLD-SCNC: 109 MMOL/L (ref 94–109)
CO2 SERPL-SCNC: 27 MMOL/L (ref 20–32)
CREAT SERPL-MCNC: 0.86 MG/DL (ref 0.66–1.25)
GFR SERPL CREATININE-BSD FRML MDRD: >90 ML/MIN/1.73M2
GLUCOSE BLD-MCNC: 111 MG/DL (ref 70–99)
POTASSIUM BLD-SCNC: 3.9 MMOL/L (ref 3.4–5.3)
PTH-INTACT SERPL-MCNC: 167 PG/ML (ref 18–80)
SODIUM SERPL-SCNC: 140 MMOL/L (ref 133–144)

## 2022-03-17 NOTE — PROGRESS NOTES
Patient with mild persistent hypercalcemia and an elevated PTH consistent with hyperparathyroidism.  Will refer to endocrinology for further evaluation and treatment of this.  I will also have him return for a vitamin D level in the near future given his history of vitamin D deficiency and the interplay with his PTH level.    Branden Esquivel MD

## 2022-03-17 NOTE — RESULT ENCOUNTER NOTE
Jacoby,  As I mentioned in the telephone call with you, your calcium level is still mildly elevated and your parathyroid hormone level is elevated as well.  Given the elevated PTH level, it would be helpful to know what your vitamin D status is, so please schedule a lab only visit to check your vitamin D level sometime in the next week or so.  I have that test ordered for you in our system.  I have also placed an endocrinology referral for you to get their input as to how best to proceed with these findings.  They will likely call you in the next business day or 2 to set up an appointment, so please schedule a visit with one of them at a location and date and time that works for you.    Branden Esquviel MD

## 2022-03-22 ENCOUNTER — LAB (OUTPATIENT)
Dept: LAB | Facility: CLINIC | Age: 52
End: 2022-03-22
Payer: COMMERCIAL

## 2022-03-22 DIAGNOSIS — E55.9 VITAMIN D DEFICIENCY: ICD-10-CM

## 2022-03-22 DIAGNOSIS — E21.3 HYPERPARATHYROIDISM (H): ICD-10-CM

## 2022-03-22 PROCEDURE — 36415 COLL VENOUS BLD VENIPUNCTURE: CPT

## 2022-03-22 PROCEDURE — 82306 VITAMIN D 25 HYDROXY: CPT

## 2022-03-23 DIAGNOSIS — E55.9 VITAMIN D DEFICIENCY: Primary | ICD-10-CM

## 2022-03-23 LAB — DEPRECATED CALCIDIOL+CALCIFEROL SERPL-MC: 12 UG/L (ref 20–75)

## 2022-03-23 NOTE — PROGRESS NOTES
Patient has significant vitamin D deficiency, which could be causing secondary hyperparathyroidism.  We will therefore initiate treatment of 50,000 international units weekly for 10 weeks and then 2000 international units daily thereafter.  He will be meeting with endocrinology on July 1 for consultation.    Branden Esquivel MD

## 2022-03-23 NOTE — RESULT ENCOUNTER NOTE
Jacoby,  Your vitamin D level is very low.  This could actually be a significant contributing factor to your hyperparathyroidism.  We should therefore treat your vitamin D deficiency and get that level up.  I sent a prescription for a vitamin D3 supplement of 50,000 international units to our [facility].  Please  that prescription and take 1 capsule weekly for 10 weeks.  Then continue with an over-the-counter vitamin D3 supplement of 2000 international units daily until you meet with the endocrinologist on July 1.This should get things moving in the right direction for you and then the endocrinologist can follow-up on these items with repeat lab testing.    Branden Esquivel MD

## 2022-04-29 NOTE — TELEPHONE ENCOUNTER
DIAGNOSIS: rt elbow pain   APPOINTMENT DATE: 05/06/2022   NOTES STATUS DETAILS   OFFICE NOTE from referring provider N/A    OFFICE NOTE from other specialist N/A    DISCHARGE SUMMARY from hospital N/A    DISCHARGE REPORT from the ER N/A    OPERATIVE REPORT N/A    EMG report N/A    MEDICATION LIST N/A    MRI N/A    DEXA (osteoporosis/bone health) N/A    CT SCAN N/A    XRAYS (IMAGES & REPORTS) N/A

## 2022-05-06 ENCOUNTER — OFFICE VISIT (OUTPATIENT)
Dept: ORTHOPEDICS | Facility: CLINIC | Age: 52
End: 2022-05-06
Payer: COMMERCIAL

## 2022-05-06 ENCOUNTER — TELEPHONE (OUTPATIENT)
Dept: ORTHOPEDICS | Facility: CLINIC | Age: 52
End: 2022-05-06

## 2022-05-06 ENCOUNTER — PRE VISIT (OUTPATIENT)
Dept: ORTHOPEDICS | Facility: CLINIC | Age: 52
End: 2022-05-06

## 2022-05-06 DIAGNOSIS — M25.521 RIGHT ELBOW PAIN: Primary | ICD-10-CM

## 2022-05-06 PROCEDURE — 99203 OFFICE O/P NEW LOW 30 MIN: CPT | Performed by: FAMILY MEDICINE

## 2022-05-06 RX ORDER — PREDNISONE 20 MG/1
40 TABLET ORAL DAILY
Qty: 10 TABLET | Refills: 0 | Status: SHIPPED | OUTPATIENT
Start: 2022-05-06 | End: 2022-05-11

## 2022-05-06 NOTE — PROGRESS NOTES
[facility]  SPORTS MEDICINE CLINIC VISIT     May 6, 2022        ASSESSMENT & PLAN    51-year-old with right forearm and elbow pain concerning for radial tunnel.  Also likely has a some lateral epicondylitis.    Reviewed imaging and assessment with patient in detail  He was provided with home exercises and referred to hand therapy.  We will try a course of oral steroid to try and reduce his nerve irritation.  We also discussed the use of topical medications as well as icing for symptomatic relief.  Follow-up if not improving after 6 weeks    Efraín Gann MD  [facility] SPORTS MEDICINE [facility]    -----  Chief Complaint   Patient presents with     Consult     Right elbow pain       SUBJECTIVE  Franklin Mejia is a/an 51 year old male who is seen as a self referral for evaluation of  Right elbow pain.     The patient is seen by themselves.  The patient is Right handed    Onset: 2-3 week(s) ago. Patient describes injury as Woke up with wife laying on his elbow   Location of Pain: right lateral epicondyle with radiation down the dorsal forearm.  Worsened by: extending arm  Better with: rest  Treatments tried: no treatment tried to date  Associated symptoms: numbness in the dorsal hand.    Orthopedic/Surgical history: NO  Social History/Occupation:  manager       REVIEW OF SYSTEMS:    Do you have fever, chills, weight loss? No    Do you have any vision problems? No    Do you have any chest pain or edema? No    Do you have any shortness of breath or wheezing?  No    Do you have stomach problems? No    Do you have any numbness or focal weakness? No    Do you have diabetes? No    Do you have problems with bleeding or clotting? No    Do you have an rashes or other skin lesions? No    OBJECTIVE:  There were no vitals taken for this visit.     General: Alert, pleasant, no distress  Right upper extremity: warm, well perfused, SILT throughout     Inspection: No  deformity or swelling.  No ecchymosis     ROM: Intact without significant discomfort     Strength: Intact but has pain with wrist extension and supination against resistance.     Palpation: TTP over the lateral epicondyle     Special Tests: Has pain in elbow with wrist flexion in a elbow extended supinated position      RADIOLOGY:    No imaging this visit

## 2022-05-06 NOTE — LETTER
5/6/2022         RE: Franklin Mejia  [address]  [place] [address]        Dear Colleague,    Thank you for referring your patient, Franklin Mejia, to the [facility] SPORTS MEDICINE CLINIC [place]. Please see a copy of my visit note below.          [facility]  SPORTS MEDICINE CLINIC VISIT     May 6, 2022        ASSESSMENT & PLAN    51-year-old with right forearm and elbow pain concerning for radial tunnel.  Also likely has a some lateral epicondylitis.    Reviewed imaging and assessment with patient in detail  He was provided with home exercises and referred to hand therapy.  We will try a course of oral steroid to try and reduce his nerve irritation.  We also discussed the use of topical medications as well as icing for symptomatic relief.  Follow-up if not improving after 6 weeks    Efraín Gann MD  [facility] SPORTS MEDICINE [facility]    -----  Chief Complaint   Patient presents with     Consult     Right elbow pain       SUBJECTIVE  Franklin Mejia is a/an 51 year old male who is seen as a self referral for evaluation of  Right elbow pain.     The patient is seen by themselves.  The patient is Right handed    Onset: 2-3 week(s) ago. Patient describes injury as Woke up with wife laying on his elbow   Location of Pain: right lateral epicondyle with radiation down the dorsal forearm.  Worsened by: extending arm  Better with: rest  Treatments tried: no treatment tried to date  Associated symptoms: numbness in the dorsal hand.    Orthopedic/Surgical history: NO  Social History/Occupation:  manager       REVIEW OF SYSTEMS:    Do you have fever, chills, weight loss? No    Do you have any vision problems? No    Do you have any chest pain or edema? No    Do you have any shortness of breath or wheezing?  No    Do you have stomach problems? No    Do you have any numbness or focal weakness? No    Do you have diabetes? No    Do you  have problems with bleeding or clotting? No    Do you have an rashes or other skin lesions? No    OBJECTIVE:  There were no vitals taken for this visit.     General: Alert, pleasant, no distress  Right upper extremity: warm, well perfused, SILT throughout     Inspection: No deformity or swelling.  No ecchymosis     ROM: Intact without significant discomfort     Strength: Intact but has pain with wrist extension and supination against resistance.     Palpation: TTP over the lateral epicondyle     Special Tests: Has pain in elbow with wrist flexion in a elbow extended supinated position      RADIOLOGY:    No imaging this visit          Again, thank you for allowing me to participate in the care of your patient.        Sincerely,        Efraín Gann MD

## 2022-05-06 NOTE — TELEPHONE ENCOUNTER
5/6 Provided phone number [phone] to schedule hand therapy.     Bernice raymond Procedure   Orthopedics, Podiatry, Sports Medicine, ENT/Eye Specialties  [facility]   [phone]

## 2022-05-06 NOTE — PATIENT INSTRUCTIONS
Take prednisone for five days in the morning with food  Ice the elbow twice daily when possible  Follow up with hand therapy  Consider using topical diclofenac on the elbow for pain relief

## 2022-05-09 NOTE — TELEPHONE ENCOUNTER
5/9 2nd attempt.  Provided phone number [phone] to schedule hand therapy.     Bernice raymond Procedure   Orthopedics, Podiatry, Sports Medicine, ENT/Eye Specialties  [facility]   [phone]

## 2022-05-31 ENCOUNTER — OFFICE VISIT (OUTPATIENT)
Dept: OPTOMETRY | Facility: CLINIC | Age: 52
End: 2022-05-31
Payer: COMMERCIAL

## 2022-05-31 DIAGNOSIS — H43.813 PVD (POSTERIOR VITREOUS DETACHMENT), BILATERAL: ICD-10-CM

## 2022-05-31 DIAGNOSIS — H52.13 HIGH MYOPIA, BOTH EYES: ICD-10-CM

## 2022-05-31 DIAGNOSIS — Z01.01 ENCOUNTER FOR EXAMINATION OF EYES AND VISION WITH ABNORMAL FINDINGS: Primary | ICD-10-CM

## 2022-05-31 DIAGNOSIS — H52.223 REGULAR ASTIGMATISM OF BOTH EYES: ICD-10-CM

## 2022-05-31 DIAGNOSIS — H52.4 PRESBYOPIA: ICD-10-CM

## 2022-05-31 PROCEDURE — 92014 COMPRE OPH EXAM EST PT 1/>: CPT | Performed by: OPTOMETRIST

## 2022-05-31 ASSESSMENT — REFRACTION_MANIFEST
OS_AXIS: 172
OS_ADD: +2.50
OD_CYLINDER: +3.00
OS_SPHERE: -8.50
OD_ADD: +2.50
OD_SPHERE: -9.50
OD_AXIS: 002
OS_CYLINDER: +2.25

## 2022-05-31 ASSESSMENT — REFRACTION_WEARINGRX
OD_SPHERE: -9.75
OS_ADD: +2.25
OD_AXIS: 005
OS_SPHERE: -8.25
OS_CYLINDER: +1.75
OS_AXIS: 172
SPECS_TYPE: PAL
OD_CYLINDER: +3.00
OD_ADD: +2.25

## 2022-05-31 ASSESSMENT — CONF VISUAL FIELD
OS_NORMAL: 1
OD_NORMAL: 1

## 2022-05-31 ASSESSMENT — EXTERNAL EXAM - RIGHT EYE: OD_EXAM: NORMAL

## 2022-05-31 ASSESSMENT — VISUAL ACUITY
OD_CC+: -1
OS_CC: 20/25
OS_CC+: -2
OD_CC: 20/20
OD_CC: 20/25
OS_CC: 20/30
CORRECTION_TYPE: GLASSES
METHOD: SNELLEN - LINEAR

## 2022-05-31 ASSESSMENT — SLIT LAMP EXAM - LIDS
COMMENTS: NORMAL
COMMENTS: NORMAL

## 2022-05-31 ASSESSMENT — TONOMETRY
IOP_METHOD: APPLANATION
OD_IOP_MMHG: 20
OS_IOP_MMHG: 20

## 2022-05-31 ASSESSMENT — EXTERNAL EXAM - LEFT EYE: OS_EXAM: NORMAL

## 2022-05-31 ASSESSMENT — CUP TO DISC RATIO
OS_RATIO: 0.55
OD_RATIO: 0.6

## 2022-05-31 NOTE — PATIENT INSTRUCTIONS
"You have a PVD- posterior vitreous detachment which is due to the gel of the eye shrinking and clumping together.  This can sometimes cause holes or tears in the retina.  The signs of a retinal detachment are flashes of light or a \"curtain veil\" coming over your vision. If you notice any of these changes return to clinic for re-evaluation.     Franklin was advised of today's exam findings.  Optional to fill new glasses prescription, minimal change  Copy of glasses Rx provided today.    Return in 1 year for eye exam, or sooner if needed.    The effects of the dilating drops last for 4- 6 hours.  You will be more sensitive to light and vision will be blurry up close.  Mydriatic sunglasses were given if needed.       Haris Campbell O.D.  [address] [facility] [place]  [address]    [phone]    "

## 2022-05-31 NOTE — PROGRESS NOTES
"Chief Complaint   Patient presents with     COMPREHENSIVE EYE EXAM         Last Eye Exam: 3/1/21   Dilated Previously: Yes    What are you currently using to see?  glasses    Distance Vision Acuity: Satisfied with vision    Near Vision Acuity: Satisfied with vision while reading  with glasses    Eye Comfort: good; his floaters have gotten worse  Do you use eye drops? : No  Occupation or Hobbies:  manager    Tameka Pardo  Optometric Assistant, [facility]       Medical, surgical and family histories reviewed and updated 5/31/2022.       OBJECTIVE: See Ophthalmology exam    ASSESSMENT:    ICD-10-CM    1. Encounter for examination of eyes and vision with abnormal findings  Z01.01    2. PVD (posterior vitreous detachment), bilateral  H43.813    3. High myopia, both eyes  H52.13    4. Regular astigmatism of both eyes  H52.223    5. Presbyopia  H52.4        PLAN:     Patient Instructions   You have a PVD- posterior vitreous detachment which is due to the gel of the eye shrinking and clumping together.  This can sometimes cause holes or tears in the retina.  The signs of a retinal detachment are flashes of light or a \"curtain veil\" coming over your vision. If you notice any of these changes return to clinic for re-evaluation.     Franklin was advised of today's exam findings.  Optional to fill new glasses prescription, minimal change  Copy of glasses Rx provided today.    Return in 1 year for eye exam, or sooner if needed.    The effects of the dilating drops last for 4- 6 hours.  You will be more sensitive to light and vision will be blurry up close.  Mydriatic sunglasses were given if needed.       Haris Campbell O.D.  [address] [place]  [address]    [phone]       "

## 2022-05-31 NOTE — LETTER
"    5/31/2022         RE: Franklin Mejia  [address]  [place] [address]        Dear Colleague,    Thank you for referring your patient, Franklin Mejia, to the [facility]. Please see a copy of my visit note below.    Chief Complaint   Patient presents with     COMPREHENSIVE EYE EXAM         Last Eye Exam: 3/1/21   Dilated Previously: Yes    What are you currently using to see?  glasses    Distance Vision Acuity: Satisfied with vision    Near Vision Acuity: Satisfied with vision while reading  with glasses    Eye Comfort: good; his floaters have gotten worse  Do you use eye drops? : No  Occupation or Hobbies:  manager    [facility]  Optometric Assistant, [facility]       Medical, surgical and family histories reviewed and updated 5/31/2022.       OBJECTIVE: See Ophthalmology exam    ASSESSMENT:    ICD-10-CM    1. Encounter for examination of eyes and vision with abnormal findings  Z01.01    2. PVD (posterior vitreous detachment), bilateral  H43.813    3. High myopia, both eyes  H52.13    4. Regular astigmatism of both eyes  H52.223    5. Presbyopia  H52.4        PLAN:     Patient Instructions   You have a PVD- posterior vitreous detachment which is due to the gel of the eye shrinking and clumping together.  This can sometimes cause holes or tears in the retina.  The signs of a retinal detachment are flashes of light or a \"curtain veil\" coming over your vision. If you notice any of these changes return to clinic for re-evaluation.     Franklin was advised of today's exam findings.  Optional to fill new glasses prescription, minimal change  Copy of glasses Rx provided today.    Return in 1 year for eye exam, or sooner if needed.    The effects of the dilating drops last for 4- 6 hours.  You will be more sensitive to light and vision will be blurry up close.  Mydriatic sunglasses were given if needed.       Haris Campbell O.D.  [facility]  [address] [facility]  [place]  [address]    [phone]           Again, thank you for allowing me to participate in the care of your patient.        Sincerely,        Haris Campbell OD

## 2022-06-06 NOTE — PROGRESS NOTES
"Hand Therapy Initial Evaluation    Current Date:  6/7/2022  Referring Provider: Efraín Gann MD MD Order Date: 5/6/2022  MD Note: right lateral epicondylitis and radial tunnel    Diagnosis: Right elbow pain   DOI: 2 months ago    Subjective:  Franklin Mejia is a 51 year old male.    Patient reports symptoms of the right elbow which occurred due to \"old age\". Since onset symptoms are Gradually getting better.  General health as reported by patient is good.  Pertinent medical history includes:Numbness/Tingling, Thyroid Problems  Medical allergies:Pencillin.  Surgical history: other: appendix.  Medication history: None.    Current occupation   Job Tasks: Computer Work, Driving, Lifting, Carrying, Operating a Machine, Assembly, Prolonged Sitting, Prolonged Standing, Pushing, Pulling, Repetitive Tasks    Occupational Profile Information:  Right hand dominant  Prior functional level:  independent-shared household chores  Patient reports symptoms of pain, stiffness/loss of motion and numbness  Special tests:  none.    Previous treatment: pain medication  Barriers include:none  Mobility: No difficulty  Transportation: drives  Currently working in normal job without restrictions  Leisure activities/hobbies: [employer], [employer]    Functional Outcome Measure:   Upper Extremity Functional Index Score:  SCORE:   Column Totals: /80: 77   (A lower score indicates greater disability.)    Objective:  Pain Level (Scale 0-10)   6/7/2022   At Rest 1/10   With Use 10/10     Pain Description  Date 6/7/2022   Location Elbow at LEP   Pain Quality Aching and Shooting   Frequency constant     Pain is worst  daytime   Exacerbated by  use   Relieved by none   Progression Unchanged     Posture  Normal    Sensation  Decreased Radial Nerve distribution per pt report    ROM  Pain Report: - none  + mild    ++ moderate    +++ severe   Elbow 6/7/2022 6/7/2022   AROM (PROM) L R   Extension 0 -3   Flexion 139 133   Supination 85 77 "   Pronation 80       82     Wrist 6/7/2022 6/7/2022   AROM (PROM) L R   Extension 74 72   Flexion 72 77   RD 15 14   UD 33 30 +     Resisted Testing  Pain Report:  - none    + mild    ++ moderate    +++ severe    6/7/2022   Elbow Extension 5/5, 0/10   Elbow Flexion 5/5, 0/10   Supination  5/5, 0/10   Pronation 5/5, 0/10   Wrist Ext with RD, Elbow at side 5/5, 1/10   Wrist Ext with UD, Elbow at side 5/5, 1/10   Wrist Ext with RD, Elbow Ext 5/5, 3/10   Wrist Ext with UD, Elbow Ext 5/5, 3/10   Wrist Flex with RD, Elbow at side 5/5, 0/10   Wrist Flex with UD, Elbow at side 5/5, 1/10   Wrist Flex with RD, Elbow Ext 5/5, 0/10   Wrist Flex with UD, Elbow Ext 5/5, 0/10   EDC with Elbow at side 5/5, 0/10   EDC with Elbow Ext 5/5, 3/10   Long Finger Test 4/5, 3/10     Neural Tension Testing  RNT: Radial Neurodynamic Test (based on TIO Moreno's ULNT)   6/7/2022   0-5 Scale 2/5   Position:   0/5: Arm across abdomen in coronal plane  1/5: Depress shoulder, ER to neutral ABD shoulder to 45 degrees  2/5: IR shoulder to end range, keep elbow at 90 degrees  3/5: Extend elbow to 0 degrees  4/5: Fully pronate forearm  5/5: Flex wrist and fingers with UD  Notes:    (+) indicates beyond grade level but less than shelter to next level    (-) indicates over shelter to level    S1  onset/change of patient's symptoms    S2 definite stop point based on patient's discomfort level    Strength   (Measured in pounds)  Pain Report: - none  + mild    ++ moderate    +++ severe    6/7/2022 6/7/2022   Trials L R   1  2  3 95 86 +   Average 95 86       6/7/2022 6/7/2022    L R   Elbow Ext 104 92 ++     Palpation  Pain Report:  - none    + mild    ++ moderate    +++ severe    6/7/2022   Proximal Triceps 0/10   Spiral Groove 0/10   Distal Triceps 0/10   Anconeus 0/10   ECRB at LEP 3/10   ECU at LEP 3/10   EDC at LEP 4/10   Radial Head 0/10   Extensor Wad 0/10   PIN Site 3/10     Assessment:  Patient presents with symptoms consistent with  diagnosis of right elbow pain, with conservative intervention.     Patient's limitations or Problem List includes:  Pain, Decreased ROM/motion, Weakness, Sensory disturbance, Decreased , Decreased pinch and Tightness in musculature of the right elbow which interferes with the patient's ability to perform Recreational Activities and Household Chores as compared to previous level of function.    Rehab Potential:  Good - Return to full activity, some limitations    Patient will benefit from skilled Occupational Therapy to increase ROM, flexibility, motion, overall strength,  strength, pinch strength and sensation and decrease pain and edema to return to previous activity level and resume normal daily tasks and to reach their rehab potential.    Barriers to Learning:  No barrier    Communication Issues:  Patient appears to be able to clearly communicate and understand verbal and written communication and follow directions correctly.    Chart Review: Chart Review and Simple history review with patient    Identified Performance Deficits: home establishment and management, meal preparation and cleanup and leisure activities    Assessment of Occupational Performance:  1-3 Performance Deficits    Clinical Decision Making (Complexity): Low complexity    Treatment Explanation:  The following has been discussed with the patient:  RX ordered/plan of care  Anticipated outcomes  Possible risks and side effects    Plan:  Frequency:  1 X week, once daily  Duration:  for 8 weeks    Treatment Plan:    Modalities:    US and Paraffin   Therapeutic Exercise:    AROM, AAROM, PROM, Tendon Gliding, Isotonics, Isometrics and Stabilization  Therapeutic Activities:  Functional activities   Neuromuscular re-ed:   Nerve Gliding and Kinesiotaping  Manual Techniques:   Joint mobilization, Friction massage, Myofascial release and Manual edema mobilization  Orthotic Fabrication:    Static  Self Care:    Self Care Tasks, Ergonomic  Considerations and Work Tasks    Discharge Plan:  Achieve all LTG.  Independent in home treatment program.  Reach maximal therapeutic benefit.    Home Program:   TENNIS ELBOW PREVENTION  Orthosis Wear and Care  Warmth  Moist heat for 5 minutes before exercises  Ball Massage to Extensors  Massage for 5 minutes  Forearm Passive Range of Motion Extensor Stretch  Reps 10 (hold for 5 seconds)  Sessions per day 3-4  Forearm PROM Advanced Flexor Stretch  Reps 10 (hold for 5 seconds)  Sessions per day 3-4  Nerve Gliding Proximal Radial  Reps 10 (hold for 5 seconds)  Sessions per day 1-2    Next Visit:  Review HEP  Review orthosis fit   MFR  Nerve gliding  US  Trial of k-taping

## 2022-06-07 ENCOUNTER — THERAPY VISIT (OUTPATIENT)
Dept: OCCUPATIONAL THERAPY | Facility: CLINIC | Age: 52
End: 2022-06-07
Attending: FAMILY MEDICINE
Payer: COMMERCIAL

## 2022-06-07 DIAGNOSIS — M25.521 RIGHT ELBOW PAIN: ICD-10-CM

## 2022-06-07 PROCEDURE — 97760 ORTHOTIC MGMT&TRAING 1ST ENC: CPT | Mod: GO

## 2022-06-07 PROCEDURE — 97165 OT EVAL LOW COMPLEX 30 MIN: CPT | Mod: GO

## 2022-06-07 PROCEDURE — 97535 SELF CARE MNGMENT TRAINING: CPT | Mod: GO

## 2022-06-07 PROCEDURE — 97110 THERAPEUTIC EXERCISES: CPT | Mod: GO

## 2022-06-14 ENCOUNTER — THERAPY VISIT (OUTPATIENT)
Dept: OCCUPATIONAL THERAPY | Facility: CLINIC | Age: 52
End: 2022-06-14
Payer: COMMERCIAL

## 2022-06-14 DIAGNOSIS — M25.521 RIGHT ELBOW PAIN: Primary | ICD-10-CM

## 2022-06-14 PROCEDURE — 97112 NEUROMUSCULAR REEDUCATION: CPT | Mod: GO

## 2022-06-14 PROCEDURE — 97140 MANUAL THERAPY 1/> REGIONS: CPT | Mod: GO

## 2022-06-14 PROCEDURE — 97035 APP MDLTY 1+ULTRASOUND EA 15: CPT | Mod: GO

## 2022-06-28 ENCOUNTER — THERAPY VISIT (OUTPATIENT)
Dept: OCCUPATIONAL THERAPY | Facility: CLINIC | Age: 52
End: 2022-06-28
Payer: COMMERCIAL

## 2022-06-28 DIAGNOSIS — M25.521 RIGHT ELBOW PAIN: Primary | ICD-10-CM

## 2022-06-28 PROCEDURE — 97140 MANUAL THERAPY 1/> REGIONS: CPT | Mod: GO

## 2022-06-28 PROCEDURE — 97035 APP MDLTY 1+ULTRASOUND EA 15: CPT | Mod: GO

## 2022-06-28 PROCEDURE — 97112 NEUROMUSCULAR REEDUCATION: CPT | Mod: GO

## 2022-07-01 ENCOUNTER — VIRTUAL VISIT (OUTPATIENT)
Dept: ENDOCRINOLOGY | Facility: CLINIC | Age: 52
End: 2022-07-01
Attending: FAMILY MEDICINE
Payer: COMMERCIAL

## 2022-07-01 DIAGNOSIS — E83.52 HYPERCALCEMIA: ICD-10-CM

## 2022-07-01 DIAGNOSIS — E21.3 HYPERPARATHYROIDISM (H): Primary | ICD-10-CM

## 2022-07-01 PROCEDURE — 99204 OFFICE O/P NEW MOD 45 MIN: CPT | Mod: 95 | Performed by: INTERNAL MEDICINE

## 2022-07-01 NOTE — PROGRESS NOTES
Franklin Mejia  is being evaluated via a billable video visit.      How would you like to obtain your AVS? [email or web address]  For the video visit, send the invitation by: Text to cell phone: [phone]  Will anyone else be joining your video visit? No    Start  End  amwell                                                                               - Endocrinology Initial Consultation -    Reason for visit/consult:  Data Unavailable    Primary care provider: Branden Esquivel    HPI: A 53 yo male here for the evaluation of elevated calcium and parathyroid hormone.  Patient has had COVID infection in 2021 and at that time he was pointed out elevated calcium level and he went back to primary care office and aware calcium was repeated and parathyroid hormone was done parathyroid hormone was 167 elevated therefore he was referred to endocrine office.  He had a history of a kidney stone on the left side in 2019 once.  No history of bone fracture no osteoporosis.  No other medical history no no major surgery before.  No polydipsia polyuria.     Past Medical/Surgical History:  Past Medical History:   Diagnosis Date     Nephrolithiasis 10/2020     Past Surgical History:   Procedure Laterality Date     APPENDECTOMY         Allergies:  Allergies   Allergen Reactions     Pcn [Penicillins]        Current Medications   Current Outpatient Medications   Medication     budesonide (PULMICORT FLEXHALER) 180 MCG/ACT inhaler     vitamin D3 (CHOLECALCIFEROL) 1.25 MG (33123 UT) capsule     No current facility-administered medications for this visit.       Family History:  Family History   Problem Relation Age of Onset     Lipids Mother      Hypertension Father          copd 81     Macular Degeneration Father      Diabetes Father      Diabetes Brother      Cerebrovascular Disease No family hx of      Thyroid Disease No family hx of      Glaucoma No family hx of      Cancer No family hx of        Social  History:  Social History     Tobacco Use     Smoking status: Former Smoker     Quit date: 8/26/2006     Years since quitting: 15.8     Smokeless tobacco: Former User     Types: Chew     Quit date: 9/1/2018   Substance Use Topics     Alcohol use: Yes     Alcohol/week: 0.0 standard drinks     Comment: occasional beer - 0-3 peer week        ROS:  Full review of systems taken with the help of the intake sheet. Otherwise a complete 14 point review of systems was taken and is negative unless stated in the history above.    Physical Exam:   Vitals: There were no vitals taken for this visit.  BMI= There is no height or weight on file to calculate BMI.   General: well appearing, no acute distress, pleasant and conversant,   Mental Status/neuro: alert and oriented  Face: symmetrical, normal facial color  Eyes: anicteric, no proptosis or lid lag  Resp: normally breathing      Labs : I reviewed data from epic and extract and summarize the pertinent data here.      Latest Reference Range & Units 03/16/22 17:31   Parathyroid Hormone Intact 18 - 80 pg/mL 167 (H)   (H): Data is abnormally high     Latest Reference Range & Units 03/29/13 07:44 03/22/19 10:03 06/04/19 13:22 10/15/20 09:44 09/09/21 06:57 01/07/22 14:04 03/16/22 17:31   Calcium 8.5 - 10.1 mg/dL 10.5 (H) 10.9 (H) 10.5 (H) 10.8 (H) 11.0 (H) 10.9 (H) 11.0 (H)   (H): Data is abnormally high  Lab Results   Component Value Date     03/16/2022     10/15/2020      Lab Results   Component Value Date    POTASSIUM 3.9 03/16/2022    POTASSIUM 4.1 10/15/2020     Lab Results   Component Value Date    CHLORIDE 109 03/16/2022    CHLORIDE 110 10/15/2020     Lab Results   Component Value Date    JUAN 11.0 03/16/2022    JUAN 10.8 10/15/2020     Lab Results   Component Value Date    CO2 27 03/16/2022    CO2 29 10/15/2020     Lab Results   Component Value Date    BUN 18 03/16/2022    BUN 16 10/15/2020     Lab Results   Component Value Date    CR 0.86 03/16/2022    CR 1.00  10/15/2020     Lab Results   Component Value Date     03/16/2022     10/15/2020     Lab Results   Component Value Date    TSH 4.75 03/22/2019     Lab Results   Component Value Date    T4 0.79 03/22/2019     No results found for: A1C    No results found for: IGF1  No results found for: LH  No results found for: FSH  No results found for: ESTROGEN  No results found for: PROLACTIN        MRI Brain: I personally reviewed the original images and agree with the below reports.   No results found for this or any previous visit.          Assessment and Plan  52 year old male with hypercalcemia and elevated parathyroid hormone level    By reviewing his mild hypercalcemia has been ongoing for over 10 years.     Currently asymptomatic    -Repeat calcium level  -Intact PTH  -Vitamin D    -24-hour urinary calcium level    -Set up parathyroid scan    -Based on the result most likely we will refer to surgeon.     Return to clinic with me in 6 months      Total 45 minutes spent on the date of the encounter doing chart review, history and exam, documentation and further activities as noted above.        Deja Irizarry MD  Staff Physician  Endocrinology and Metabolism  License: [id number]

## 2022-07-01 NOTE — LETTER
7/1/2022         RE: Franklin Mejia  [address]  [place] [address]        Dear Colleague,    Thank you for referring your patient, Franklin Mejia, to the [facility]. Please see a copy of my visit note below.    Franklin Mejia  is being evaluated via a billable video visit.      How would you like to obtain your AVS? MyChart  For the video visit, send the invitation by: Text to cell phone: [phone]  Will anyone else be joining your video visit? No    Start  End  amwell                                                                               - Endocrinology Initial Consultation -    Reason for visit/consult:  Data Unavailable    Primary care provider: Branden Esquivel    HPI: A 53 yo male here for the evaluation of elevated calcium and parathyroid hormone.  Patient has had COVID infection in December 2021 and at that time he was pointed out elevated calcium level and he went back to primary care office and aware calcium was repeated and parathyroid hormone was done parathyroid hormone was 167 elevated therefore he was referred to endocrine office.  He had a history of a kidney stone on the left side in October 2019 once.  No history of bone fracture no osteoporosis.  No other medical history no no major surgery before.  No polydipsia polyuria.     Past Medical/Surgical History:  Past Medical History:   Diagnosis Date     Nephrolithiasis 10/2020     Past Surgical History:   Procedure Laterality Date     APPENDECTOMY  1983       Allergies:  Allergies   Allergen Reactions     Pcn [Penicillins]        Current Medications   Current Outpatient Medications   Medication     budesonide (PULMICORT FLEXHALER) 180 MCG/ACT inhaler     vitamin D3 (CHOLECALCIFEROL) 1.25 MG (03383 UT) capsule     No current facility-administered medications for this visit.       Family History:  Family History   Problem Relation Age of Onset     Lipids Mother      Hypertension Father           copd 81     Macular Degeneration Father      Diabetes Father      Diabetes Brother      Cerebrovascular Disease No family hx of      Thyroid Disease No family hx of      Glaucoma No family hx of      Cancer No family hx of        Social History:  Social History     Tobacco Use     Smoking status: Former Smoker     Quit date: 2006     Years since quitting: 15.8     Smokeless tobacco: Former User     Types: Chew     Quit date: 2018   Substance Use Topics     Alcohol use: Yes     Alcohol/week: 0.0 standard drinks     Comment: occasional beer - 0-3 peer week        ROS:  Full review of systems taken with the help of the intake sheet. Otherwise a complete 14 point review of systems was taken and is negative unless stated in the history above.    Physical Exam:   Vitals: There were no vitals taken for this visit.  BMI= There is no height or weight on file to calculate BMI.   General: well appearing, no acute distress, pleasant and conversant,   Mental Status/neuro: alert and oriented  Face: symmetrical, normal facial color  Eyes: anicteric, no proptosis or lid lag  Resp: normally breathing      Labs : I reviewed data from epic and extract and summarize the pertinent data here.      Latest Reference Range & Units 22 17:31   Parathyroid Hormone Intact 18 - 80 pg/mL 167 (H)   (H): Data is abnormally high     Latest Reference Range & Units 13 07:44 19 10:03 19 13:22 10/15/20 09:44 21 06:57 22 14:04 22 17:31   Calcium 8.5 - 10.1 mg/dL 10.5 (H) 10.9 (H) 10.5 (H) 10.8 (H) 11.0 (H) 10.9 (H) 11.0 (H)   (H): Data is abnormally high  Lab Results   Component Value Date     2022     10/15/2020      Lab Results   Component Value Date    POTASSIUM 3.9 2022    POTASSIUM 4.1 10/15/2020     Lab Results   Component Value Date    CHLORIDE 109 2022    CHLORIDE 110 10/15/2020     Lab Results   Component Value Date    JUAN 11.0 2022    JUAN 10.8  10/15/2020     Lab Results   Component Value Date    CO2 27 03/16/2022    CO2 29 10/15/2020     Lab Results   Component Value Date    BUN 18 03/16/2022    BUN 16 10/15/2020     Lab Results   Component Value Date    CR 0.86 03/16/2022    CR 1.00 10/15/2020     Lab Results   Component Value Date     03/16/2022     10/15/2020     Lab Results   Component Value Date    TSH 4.75 03/22/2019     Lab Results   Component Value Date    T4 0.79 03/22/2019     No results found for: A1C    No results found for: IGF1  No results found for: LH  No results found for: FSH  No results found for: ESTROGEN  No results found for: PROLACTIN        MRI Brain: I personally reviewed the original images and agree with the below reports.   No results found for this or any previous visit.          Assessment and Plan  52 year old male with hypercalcemia and elevated parathyroid hormone level    By reviewing his mild hypercalcemia has been ongoing for over 10 years.     Currently asymptomatic    -Repeat calcium level  -Intact PTH  -Vitamin D    -24-hour urinary calcium level    -Set up parathyroid scan    -Based on the result most likely we will refer to surgeon.     Return to clinic with me in 6 months      Total 45 minutes spent on the date of the encounter doing chart review, history and exam, documentation and further activities as noted above.        Deja Irizarry MD  Staff Physician  Endocrinology and Metabolism  License: [id number]      Again, thank you for allowing me to participate in the care of your patient.        Sincerely,        Deja Irizarry MD

## 2022-07-05 ENCOUNTER — THERAPY VISIT (OUTPATIENT)
Dept: OCCUPATIONAL THERAPY | Facility: CLINIC | Age: 52
End: 2022-07-05
Payer: COMMERCIAL

## 2022-07-05 DIAGNOSIS — M25.521 RIGHT ELBOW PAIN: Primary | ICD-10-CM

## 2022-07-05 PROCEDURE — 97112 NEUROMUSCULAR REEDUCATION: CPT | Mod: GO

## 2022-07-05 PROCEDURE — 97140 MANUAL THERAPY 1/> REGIONS: CPT | Mod: GO

## 2022-07-05 PROCEDURE — 97035 APP MDLTY 1+ULTRASOUND EA 15: CPT | Mod: GO

## 2022-07-06 ENCOUNTER — TELEPHONE (OUTPATIENT)
Dept: ENDOCRINOLOGY | Facility: CLINIC | Age: 52
End: 2022-07-06

## 2022-07-06 NOTE — TELEPHONE ENCOUNTER
7/6 1st attempt.  LVM for patient to schedule a NM parathyroid scan at the .  [facility] #[phone] to schedule.    Thanks    Cierra Ledezma  Pediatric Specialty /Adult Endocrinology  [facility]

## 2022-07-12 ENCOUNTER — THERAPY VISIT (OUTPATIENT)
Dept: OCCUPATIONAL THERAPY | Facility: CLINIC | Age: 52
End: 2022-07-12
Payer: COMMERCIAL

## 2022-07-12 DIAGNOSIS — M25.521 RIGHT ELBOW PAIN: Primary | ICD-10-CM

## 2022-07-12 PROCEDURE — 97140 MANUAL THERAPY 1/> REGIONS: CPT | Mod: GO

## 2022-07-12 PROCEDURE — 97112 NEUROMUSCULAR REEDUCATION: CPT | Mod: GO

## 2022-07-12 PROCEDURE — 97110 THERAPEUTIC EXERCISES: CPT | Mod: GO

## 2022-07-12 NOTE — PROGRESS NOTES
Hand Therapy Progress Note    Current Date:  7/12/2022  Referring Provider: Efraín Gann MD MD Order Date: 5/6/2022  MD Note: right lateral epicondylitis and radial tunnel    Diagnosis: Right elbow pain   DOI: 2 months ago    Reporting period is 6/7/2022 to 7/12/2022    Subjective:   Subjective changes noted by patient:  Not too bad today. I didn't do any chainsawing, retaining walls, or projects this weekend and I think that is the key.   Functional changes noted by patient:  Improvement in Recreational Activities  Decreased Performance in Work Tasks, Sleep Patterns and Household Chores  Patient has noted adverse reaction to:  None    Functional Outcome Measure:  Upper Extremity Functional Index Score:  SCORE:   Column Totals: /80: 59   (A lower score indicates greater disability.)    Objective:  Pain Level (Scale 0-10)   6/7/2022 7/12/2022   At Rest 1/10 2/10   With Use 10/10 4/10     Pain Description  Date 6/7/2022   Location Elbow at LEP   Pain Quality Aching and Shooting   Frequency constant     Pain is worst  daytime   Exacerbated by  use   Relieved by none   Progression Unchanged     Posture  Normal    Sensation  Decreased Radial Nerve distribution per pt report    ROM  Pain Report: - none  + mild    ++ moderate    +++ severe   Elbow 6/7/2022 6/7/2022 7/12/2022   AROM (PROM) L R R   Extension 0 -3 0   Flexion 139 133 137   Supination 85 77 89   Pronation 80       82       87     Wrist 6/7/2022 6/7/2022 7/12/2022   AROM (PROM) L R R   Extension 74 72 74   Flexion 72 77 80   RD 15 14 15   UD 33 30 + 34     Resisted Testing  Pain Report:  - none    + mild    ++ moderate    +++ severe    6/7/2022 7/12/2022   Elbow Extension 5/5, 0/10    Elbow Flexion 5/5, 0/10    Supination  5/5, 0/10    Pronation 5/5, 0/10    Wrist Ext with RD, Elbow at side 5/5, 1/10 5/5, 0/10   Wrist Ext with UD, Elbow at side 5/5, 1/10 5/5, 0/10   Wrist Ext with RD, Elbow Ext 5/5, 3/10 5/5, 2/10   Wrist Ext with UD, Elbow Ext 5/5, 3/10 5/5,  2/10   Wrist Flex with RD, Elbow at side 5/5, 0/10    Wrist Flex with UD, Elbow at side 5/5, 1/10 5/5, 0/10   Wrist Flex with RD, Elbow Ext 5/5, 0/10    Wrist Flex with UD, Elbow Ext 5/5, 0/10    EDC with Elbow at side 5/5, 0/10    EDC with Elbow Ext 5/5, 3/10 5/5, 1-2/10   Long Finger Test 4/5, 3/10 5/5, 1/10     Neural Tension Testing  RNT: Radial Neurodynamic Test (based on DS Tiffanie's ULNT)   6/7/2022 7/12/2022   0-5 Scale 2/5 2-3/5   Position:   0/5: Arm across abdomen in coronal plane  1/5: Depress shoulder, ER to neutral ABD shoulder to 45 degrees  2/5: IR shoulder to end range, keep elbow at 90 degrees  3/5: Extend elbow to 0 degrees  4/5: Fully pronate forearm  5/5: Flex wrist and fingers with UD  Notes:    (+) indicates beyond grade level but less than long term to next level    (-) indicates over long term to level    S1  onset/change of patient's symptoms    S2 definite stop point based on patient's discomfort level    Strength   (Measured in pounds)  Pain Report: - none  + mild    ++ moderate    +++ severe    6/7/2022 6/7/2022 7/12/2022   Trials L R R   1  2  3 95 86 + 85 +   Average 95 86 85       6/7/2022 6/7/2022 7/12/2022    L R R   Elbow Ext 104 92 ++ 71 ++     Palpation  Pain Report:  - none    + mild    ++ moderate    +++ severe    6/7/2022 7/12/2022   Proximal Triceps 0/10    Spiral Groove 0/10    Distal Triceps 0/10    Anconeus 0/10    ECRB at LEP 3/10 1/10   ECU at LEP 3/10 0/10   EDC at LEP 4/10 2/10   Radial Head 0/10    Extensor Wad 0/10    PIN Site 3/10 5/10     Please refer to the daily flowsheet for treatment provided today.     Assessment:  Response to therapy has been improvement to:  ROM of Elbow:  Ext , Flex, Supination and Pronation  Wrist:  Ext , Flex and Ulnar Deviation  Pain:  intensity of pain is decreased and less tender over affected area  Paresthesias:  Radial nerve - smaller area of involvement  Response to therapy has been lack of progress in:  Strength:   and  pinch    Overall Assessment:  Patient is progressing well and is ready to decrease frequency of treatment in the clinic.  Patient is becoming more independent in home exercise program  Patient would benefit from continued therapy to achieve rehab potential  STG/LTG:  STGoals have been reviewed and progress or achievement has occurred;  see goal sheet for details and updates.    Plan:  Frequency/Duration:  Recommend continuing to see patient  2 X a month, once daily  for 2 months  Appropriateness of Rx I have re-evaluated this patient and find that the nature, scope, duration and intensity of the therapy is appropriate for the medical condition of the patient.    Treatment Plan:  Modalities:    US and Paraffin   Therapeutic Exercise:    AROM, AAROM, PROM, Tendon Gliding, Isotonics, Isometrics and Stabilization  Therapeutic Activities:  Functional activities   Neuromuscular re-ed:   Nerve Gliding and Kinesiotaping  Manual Techniques:   Joint mobilization, Friction massage, Myofascial release and Manual edema mobilization  Orthotic Fabrication:    Static  Self Care:    Self Care Tasks, Ergonomic Considerations and Work Tasks    Discharge Plan:  Achieve all LTG.  Independent in home treatment program.  Reach maximal therapeutic benefit.    Home Program:   TENNIS ELBOW PREVENTION  Orthosis Wear and Care  Warmth  Moist heat for 5 minutes before exercises  Ball Massage to Extensors  Massage for 5 minutes  Forearm Passive Range of Motion Extensor Stretch  Reps 10 (hold for 5 seconds)  Sessions per day 3-4  Forearm PROM Advanced Flexor Stretch  Reps 10 (hold for 5 seconds)  Sessions per day 3-4  Nerve Gliding Proximal Radial  Reps 10 (hold for 5 seconds)  Sessions per day 1-2    Next Visit:  Review HEP   MFR  Nerve gliding  US

## 2022-07-19 ENCOUNTER — THERAPY VISIT (OUTPATIENT)
Dept: OCCUPATIONAL THERAPY | Facility: CLINIC | Age: 52
End: 2022-07-19
Payer: COMMERCIAL

## 2022-07-19 DIAGNOSIS — M25.521 RIGHT ELBOW PAIN: Primary | ICD-10-CM

## 2022-07-19 PROCEDURE — 97140 MANUAL THERAPY 1/> REGIONS: CPT | Mod: GO

## 2022-07-19 PROCEDURE — 97112 NEUROMUSCULAR REEDUCATION: CPT | Mod: GO

## 2022-07-19 PROCEDURE — 97035 APP MDLTY 1+ULTRASOUND EA 15: CPT | Mod: GO

## 2022-07-26 ENCOUNTER — HOSPITAL ENCOUNTER (OUTPATIENT)
Dept: NUCLEAR MEDICINE | Facility: CLINIC | Age: 52
Setting detail: NUCLEAR MEDICINE
Discharge: HOME | End: 2022-07-26
Attending: INTERNAL MEDICINE
Payer: COMMERCIAL

## 2022-07-26 DIAGNOSIS — E21.3 HYPERPARATHYROIDISM (H): ICD-10-CM

## 2022-07-26 PROCEDURE — 78072 PARATHYRD PLANAR W/SPECT&CT: CPT

## 2022-07-26 PROCEDURE — 78072 PARATHYRD PLANAR W/SPECT&CT: CPT | Mod: 26 | Performed by: RADIOLOGY

## 2022-07-26 PROCEDURE — A9516 IODINE I-123 SOD IODIDE MIC: HCPCS | Performed by: INTERNAL MEDICINE

## 2022-07-26 PROCEDURE — 343N000001 HC RX 343: Performed by: INTERNAL MEDICINE

## 2022-07-26 PROCEDURE — A9500 TC99M SESTAMIBI: HCPCS | Performed by: INTERNAL MEDICINE

## 2022-07-26 RX ADMIN — Medication 26 MCI.: at 12:35

## 2022-07-26 RX ADMIN — Medication 800 UCI.: at 09:35

## 2022-08-08 ENCOUNTER — TELEPHONE (OUTPATIENT)
Dept: ENDOCRINOLOGY | Facility: CLINIC | Age: 52
End: 2022-08-08

## 2022-08-08 DIAGNOSIS — E21.0 PRIMARY HYPERPARATHYROIDISM (H): Primary | ICD-10-CM

## 2022-08-08 NOTE — TELEPHONE ENCOUNTER
[facility]    Phone Message    May a detailed message be left on voicemail: yes     Reason for Call: Other: Jacoby is calling in for his test results from his thyroid scan.  Just wants Doctor to give him a call.  Thank youl     Action Taken: Other: Endo    Travel Screening: Not Applicable

## 2022-08-09 ENCOUNTER — TELEPHONE (OUTPATIENT)
Dept: SURGERY | Facility: CLINIC | Age: 52
End: 2022-08-09

## 2022-08-09 ENCOUNTER — TELEPHONE (OUTPATIENT)
Dept: OTOLARYNGOLOGY | Facility: CLINIC | Age: 52
End: 2022-08-09

## 2022-08-09 ENCOUNTER — THERAPY VISIT (OUTPATIENT)
Dept: OCCUPATIONAL THERAPY | Facility: CLINIC | Age: 52
End: 2022-08-09
Payer: COMMERCIAL

## 2022-08-09 DIAGNOSIS — M25.521 RIGHT ELBOW PAIN: Primary | ICD-10-CM

## 2022-08-09 PROCEDURE — 97140 MANUAL THERAPY 1/> REGIONS: CPT | Mod: GO

## 2022-08-09 PROCEDURE — 97035 APP MDLTY 1+ULTRASOUND EA 15: CPT | Mod: GO

## 2022-08-09 PROCEDURE — 97112 NEUROMUSCULAR REEDUCATION: CPT | Mod: GO

## 2022-08-09 NOTE — TELEPHONE ENCOUNTER
"LVM after being unable to reach patient. Left ENT scheduling and direct line for patient to call and make appointment.     SCHEDULING INSTRUCTIONS: Please schedle patient for NEW appointment in [facility] or [facility] ENT department per patient preference. Please include \"referred by Dr. Irizarry\" in appointment notes.   "

## 2022-08-09 NOTE — TELEPHONE ENCOUNTER
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 I called the patient and placed referral to Dr. Villagomez.         Edie GARCIA

## 2022-08-09 NOTE — TELEPHONE ENCOUNTER
Left voicemail for patient to call back and schedule consult appointment.    [facility]   Dermatology, General and Plastic Surgery, Urology Specialties   [facility] [place] [address]      ----- Message from Cielo Leo RN sent at 8/9/2022  8:13 AM CDT -----  Regarding: FW: primary hyperpara referral  Please assist with scheduling a consult with  in [place] or [facility] if able to schedule there. There is one opening this Thursday in  at 11am-fyi       Thank you    Cielo   ----- Message -----  From: Judit Villagomez MD  Sent: 8/9/2022   6:42 AM CDT  To: Cielo Leo RN, Emilie Oden RN, #  Subject: RE: primary hyperpara referral                   Happy to see him.   Thank you for your kind referral.    [facility] [place]  ----- Message -----  From: Deja Irizarry MD  Sent: 8/8/2022   7:45 PM CDT  To: Judit Villagomez MD  Subject: primary hyperpara referral                       Hi Dr. Villagomez,     Could you meet this very nice patient? He went to [facility] and right inferior neck lesion.     Thank you very much, always!!    Deja

## 2022-08-09 NOTE — TELEPHONE ENCOUNTER
Pt called, no answer. VM Id's pt. Left detailed message with reason for call and  for pt to call the [facility] back at [phone]...Cielo Leo RN

## 2022-08-09 NOTE — TELEPHONE ENCOUNTER
Please refer to scheduling encounters from Dr. Villagomez team.      Ana Maria Bryant, RN  Endocrine Care Coordinator  [facility]

## 2022-08-11 NOTE — TELEPHONE ENCOUNTER
RN noted pt is scheduled..Judit Angulo RN, MD Araki, Takako, MD; Emilie Oden RN; Cielo Leo RN  Happy to see him.   Thank you for your kind referral.     Thania             Previous Messages       ----- Message -----   From: Deja Irizarry MD   Sent: 8/8/2022   7:45 PM CDT   To: Judit Villagomez MD   Subject: primary hyperpara referral                       Hi Dr. Villagomez,     Could you meet this very nice patient? He went to [facility] and right inferior neck lesion.     Thank you very much, always!!     Deja

## 2022-08-23 ENCOUNTER — THERAPY VISIT (OUTPATIENT)
Dept: OCCUPATIONAL THERAPY | Facility: CLINIC | Age: 52
End: 2022-08-23
Payer: COMMERCIAL

## 2022-08-23 ENCOUNTER — LAB (OUTPATIENT)
Dept: LAB | Facility: CLINIC | Age: 52
End: 2022-08-23
Payer: COMMERCIAL

## 2022-08-23 DIAGNOSIS — M25.521 RIGHT ELBOW PAIN: Primary | ICD-10-CM

## 2022-08-23 DIAGNOSIS — E21.3 HYPERPARATHYROIDISM (H): ICD-10-CM

## 2022-08-23 LAB
ANION GAP SERPL CALCULATED.3IONS-SCNC: 3 MMOL/L (ref 3–14)
BUN SERPL-MCNC: 15 MG/DL (ref 7–30)
CALCIUM SERPL-MCNC: 10.9 MG/DL (ref 8.5–10.1)
CHLORIDE BLD-SCNC: 112 MMOL/L (ref 94–109)
CO2 SERPL-SCNC: 27 MMOL/L (ref 20–32)
CREAT SERPL-MCNC: 0.97 MG/DL (ref 0.66–1.25)
GFR SERPL CREATININE-BSD FRML MDRD: >90 ML/MIN/1.73M2
GLUCOSE BLD-MCNC: 96 MG/DL (ref 70–99)
POTASSIUM BLD-SCNC: 4.1 MMOL/L (ref 3.4–5.3)
PTH-INTACT SERPL-MCNC: 154 PG/ML (ref 15–65)
SODIUM SERPL-SCNC: 142 MMOL/L (ref 133–144)

## 2022-08-23 PROCEDURE — 83970 ASSAY OF PARATHORMONE: CPT

## 2022-08-23 PROCEDURE — 97112 NEUROMUSCULAR REEDUCATION: CPT | Mod: GO

## 2022-08-23 PROCEDURE — 97140 MANUAL THERAPY 1/> REGIONS: CPT | Mod: GO

## 2022-08-23 PROCEDURE — 36415 COLL VENOUS BLD VENIPUNCTURE: CPT

## 2022-08-23 PROCEDURE — 97035 APP MDLTY 1+ULTRASOUND EA 15: CPT | Mod: GO

## 2022-08-23 PROCEDURE — 80048 BASIC METABOLIC PNL TOTAL CA: CPT

## 2022-08-23 PROCEDURE — 82306 VITAMIN D 25 HYDROXY: CPT

## 2022-08-24 LAB — DEPRECATED CALCIDIOL+CALCIFEROL SERPL-MC: 31 UG/L (ref 20–75)

## 2022-08-28 ENCOUNTER — LAB (OUTPATIENT)
Dept: LAB | Facility: CLINIC | Age: 52
End: 2022-08-28
Payer: COMMERCIAL

## 2022-08-28 DIAGNOSIS — E21.3 HYPERPARATHYROIDISM (H): ICD-10-CM

## 2022-08-28 LAB
CALCIUM 24H UR-MRATE: 0.68 G/SPEC (ref 0.1–0.3)
CALCIUM UR-MCNC: 18.1 MG/DL
COLLECT DURATION TIME UR: 23 H
SPECIMEN VOL UR: 3600 ML

## 2022-08-28 PROCEDURE — 81050 URINALYSIS VOLUME MEASURE: CPT

## 2022-08-28 PROCEDURE — 82340 ASSAY OF CALCIUM IN URINE: CPT

## 2022-09-06 ENCOUNTER — THERAPY VISIT (OUTPATIENT)
Dept: OCCUPATIONAL THERAPY | Facility: CLINIC | Age: 52
End: 2022-09-06
Payer: COMMERCIAL

## 2022-09-06 DIAGNOSIS — M25.521 RIGHT ELBOW PAIN: Primary | ICD-10-CM

## 2022-09-06 PROCEDURE — 97140 MANUAL THERAPY 1/> REGIONS: CPT | Mod: GO

## 2022-09-06 PROCEDURE — 97110 THERAPEUTIC EXERCISES: CPT | Mod: GO

## 2022-09-06 PROCEDURE — 97112 NEUROMUSCULAR REEDUCATION: CPT | Mod: GO

## 2022-09-06 NOTE — PROGRESS NOTES
Hand Therapy Discharge Note    Current Date:  9/6/2022  Referring Provider: Efraín Gann MD MD Order Date: 5/6/2022  MD Note: right lateral epicondylitis and radial tunnel    Diagnosis: Right elbow pain   DOI: 2 months ago    Reporting period is 7/12/2022 to 9/6/2022    Subjective:   Subjective changes noted by patient:  Not too bad. I notice it's a little stiff. I was lifting a bag at the fair and I noticed I still have trouble with that.   Functional changes noted by patient:  Improvement in Work Tasks and Household Chores  Patient has noted adverse reaction to:  None    Functional Outcome Measure:  Upper Extremity Functional Index Score:  SCORE:   Column Totals: /80: 74   (A lower score indicates greater disability.)    Objective:  Pain Level (Scale 0-10)   6/7/2022 7/12/2022 9/6/2022   At Rest 1/10 2/10 1-2/10   With Use 10/10 4/10 2/10     Pain Description  Date 6/7/2022   Location Elbow at LEP   Pain Quality Aching and Shooting   Frequency constant     Pain is worst  daytime   Exacerbated by  use   Relieved by none   Progression Unchanged     Posture  Normal    Sensation  Decreased Radial Nerve distribution per pt report    ROM  Pain Report: - none  + mild    ++ moderate    +++ severe   Elbow 6/7/2022 6/7/2022 7/12/2022   AROM (PROM) L R R   Extension 0 -3 0   Flexion 139 133 137   Supination 85 77 89   Pronation 80       82       87     Wrist 6/7/2022 6/7/2022 7/12/2022 9/6/2022   AROM (PROM) L R R R   Extension 74 72 74 73   Flexion 72 77 80 87   RD 15 14 15 16   UD 33 30 + 34 33     Resisted Testing  Pain Report:  - none    + mild    ++ moderate    +++ severe    6/7/2022 7/12/2022 9/6/2022   Elbow Extension 5/5, 0/10     Elbow Flexion 5/5, 0/10     Supination  5/5, 0/10     Pronation 5/5, 0/10     Wrist Ext with RD, Elbow at side 5/5, 1/10 5/5, 0/10    Wrist Ext with UD, Elbow at side 5/5, 1/10 5/5, 0/10    Wrist Ext with RD, Elbow Ext 5/5, 3/10 5/5, 2/10 5/5, 2/10   Wrist Ext with UD, Elbow Ext 5/5, 3/10  5/5, 2/10    Wrist Flex with RD, Elbow at side 5/5, 0/10     Wrist Flex with UD, Elbow at side 5/5, 1/10 5/5, 0/10    Wrist Flex with RD, Elbow Ext 5/5, 0/10     Wrist Flex with UD, Elbow Ext 5/5, 0/10     EDC with Elbow at side 5/5, 0/10     EDC with Elbow Ext 5/5, 3/10 5/5, 1-2/10 5/5, 2/10   Long Finger Test 4/5, 3/10 5/5, 1/10 5/5, 2/10     Neural Tension Testing  RNT: Radial Neurodynamic Test (based on DS Moreno's ULNT)   6/7/2022 7/12/2022 9/6/2022   0-5 Scale 2/5 2-3/5 3/5   Position:   0/5: Arm across abdomen in coronal plane  1/5: Depress shoulder, ER to neutral ABD shoulder to 45 degrees  2/5: IR shoulder to end range, keep elbow at 90 degrees  3/5: Extend elbow to 0 degrees  4/5: Fully pronate forearm  5/5: Flex wrist and fingers with UD  Notes:    (+) indicates beyond grade level but less than custodial to next level    (-) indicates over custodial to level    S1  onset/change of patient's symptoms    S2 definite stop point based on patient's discomfort level    Strength   (Measured in pounds)  Pain Report: - none  + mild    ++ moderate    +++ severe    6/7/2022 6/7/2022 7/12/2022 9/6/2022   Trials L R R R   1  2  3 95 86 + 85 + 91   Average 95 86 85 91       6/7/2022 6/7/2022 7/12/2022 9/6/2022    L R R R   Elbow Ext 104 92 ++ 71 ++ 88 ++     Palpation  Pain Report:  - none    + mild    ++ moderate    +++ severe    6/7/2022 7/12/2022 9/6/2022   Proximal Triceps 0/10     Spiral Groove 0/10     Distal Triceps 0/10     Anconeus 0/10     ECRB at LEP 3/10 1/10 0/10   ECU at LEP 3/10 0/10    EDC at LEP 4/10 2/10 0/10   Radial Head 0/10     Extensor Wad 0/10     PIN Site 3/10 5/10 3-4/10     Please refer to the daily flowsheet for treatment provided today.     Assessment:  Response to therapy has been improvement to:  ROM of Wrist:  Ext , Flex, Radial Deviation and Ulnar Deviation  Strength:   and pinch  Pain:  frequency is less, intensity of pain is decreased, duration of pain is decreased and less  tender over affected area    Overall Assessment:  Patient's symptoms are resolving.  Patient is progressing well and is ready to decrease frequency of treatment in the clinic.  Patient is becoming more independent in home exercise program  STG/LTG:  STGoals have been reviewed and progress or achievement has occurred;  see goal sheet for details and updates.    Plan:  Patient reports they are ready to discharge from hand therapy. They will independently manage their symptoms and HEP.     Home Exercise Program:  TENNIS ELBOW PREVENTION  Orthosis Wear and Care  Warmth  Moist heat for 5 minutes before exercises  Ball Massage to Extensors  Massage for 5 minutes  Forearm Passive Range of Motion Extensor Stretch  Reps 10 (hold for 5 seconds)  Sessions per day 3-4  Forearm PROM Advanced Flexor Stretch  Reps 10 (hold for 5 seconds)  Sessions per day 3-4  Nerve Gliding Proximal Radial  Reps 10 (hold for 5 seconds)  Sessions per day 1-2

## 2022-09-08 ENCOUNTER — OFFICE VISIT (OUTPATIENT)
Dept: SURGERY | Facility: CLINIC | Age: 52
End: 2022-09-08
Attending: INTERNAL MEDICINE
Payer: COMMERCIAL

## 2022-09-08 VITALS
WEIGHT: 209.9 LBS | BODY MASS INDEX: 26.94 KG/M2 | DIASTOLIC BLOOD PRESSURE: 86 MMHG | SYSTOLIC BLOOD PRESSURE: 129 MMHG | HEIGHT: 74 IN | OXYGEN SATURATION: 100 % | HEART RATE: 74 BPM

## 2022-09-08 DIAGNOSIS — E21.0 PRIMARY HYPERPARATHYROIDISM (H): ICD-10-CM

## 2022-09-08 PROCEDURE — 99203 OFFICE O/P NEW LOW 30 MIN: CPT | Performed by: SURGERY

## 2022-09-08 RX ORDER — CHOLECALCIFEROL (VITAMIN D3) 50 MCG
1 TABLET ORAL DAILY
COMMUNITY

## 2022-09-08 ASSESSMENT — PAIN SCALES - GENERAL: PAINLEVEL: NO PAIN (0)

## 2022-09-08 NOTE — LETTER
"    2022         RE: Franklin Mejia  [address]  [place] [address]        Dear Colleague,    Thank you for referring your patient, Franklin Mejia, to the [facility]. Please see a copy of my visit note below.    SURGERY CLINIC CONSULTATION    REASON FOR CONSULTATION:  Franklin Mejia was referred by Dr. Irizarry for evaluation and discussion of treatment options for hyperparathyroidism     HISTORY OF PRESENT ILLNESS:  Franklin Mejia is a 52 year old male who was noted to have kidney stones in 2019. Work up confirmed hypercalcemia. Most recent labs from Aug 2022 include: Ca 10.9, , UCa 680mg/24 hrs. [facility] parathyroid scan localized to right inferior position.    Symptoms associated with HPTH include fatigue and nephrolithiasis. No fractures      REVIEW OF SYSTEMS:  ROS EXAM: 10pt ROS pertinent for that noted in HPI  Patient Active Problem List   Diagnosis     Gastroesophageal reflux disease, esophagitis presence not specified     Overweight (BMI 25.0-29.9)     Multiple benign nevi     Vitamin D deficiency     Nephrolithiasis     TMJ (temporomandibular joint syndrome)     Hyperparathyroidism (H)     Right elbow pain       Past Surgical History:   Procedure Laterality Date     APPENDECTOMY         Allergies   Allergen Reactions     Pcn [Penicillins]        Medications reviewed in EMR        Family History   Problem Relation Age of Onset     Lipids Mother      Hypertension Father          copd 81     Macular Degeneration Father      Diabetes Father      Chronic Obstructive Pulmonary Disease Father      Cerebrovascular Disease Paternal Half-Brother      Diabetes Paternal Half-Brother      Thyroid Disease No family hx of      Glaucoma No family hx of      Cancer No family hx of         PHYSICAL EXAM:  /86   Pulse 74   Ht 1.88 m (6' 2\")   Wt 95.2 kg (209 lb 14.4 oz)   SpO2 100%   BMI 26.95 kg/m      Neck: no palpable masses. Thyroid " gland WNL    I personally reviewed the radiographic images and laboratory data    ASSESSMENT:   1. Primary hyperparathyroidism (H)        PLAN: I recommend neck exploration resection parathyroid adenoma. The procedure and risks were discussed with the patient including, but not limited to bleeding, infection, injury to the RLN(s), permanent hypocalcemia or missed adenoma.    Judit Villagomez MD                Again, thank you for allowing me to participate in the care of your patient.        Sincerely,        Judit Villagomez MD

## 2022-09-08 NOTE — NURSING NOTE
"Franklin Mejia's chief complaint for this visit includes:  Chief Complaint   Patient presents with     Consult     Primary hyperparathyroidism     PCP: Branden Esquivel    Referring Provider:  Referred Self, MD  No address on file    /86   Pulse 74   Ht 1.88 m (6' 2\")   Wt 95.2 kg (209 lb 14.4 oz)   SpO2 100%   BMI 26.95 kg/m    No Pain (0)        Allergies   Allergen Reactions     Pcn [Penicillins]          Do you need any medication refills at today's visit? No    Chayo Enriquez CMA        "

## 2022-09-08 NOTE — PATIENT INSTRUCTIONS
Surgery Instructions    Always follow your surgeon s instructions. If you don t, your surgery could be cancelled. Please use the following checklist.  Your surgery is on: The surgery scheduler will contact you within 1 week of your consult with the surgeon. If you do not hear from them, please call the clinic or RN Care Coordinator for your provider.    Time: Prearrival times can vary depending on location/type of surgery.  [place] - 2 hour pre-arrival  [facility] [place] - 2 hour pre-arrival  [place] - 1 hour pre-arrival    Note:  These times may change. A nurse will call you before surgery to confirm. If you have not received a call or if you have more questions, please call us on the working day before your surgery:  [place]: [phone] or [phone] (9am to 5:30pm)  [facility] [place]: [phone] (8am to 6pm)  [place]: [phone] (9am to 5pm)  [facility] [phone] (7am to 4pm)  Prior to surgery  Have a pre-op physical exam with your Primary Doctor within 30 days of surgery  Ask your doctor to send all of your results to the surgery center/hospital before surgery. Your doctor also may ask you to bring the results with you on the day of surgery.  Tell your doctor if:  You are allergic to latex or rubber (latex and rubber gloves are often used in medical care).  You are taking any medicines (including aspirin), vitamins, or herbal products. You may need to stop taking some medicines before surgery.  You have any medical problems (allergies, diabetes, or heart disease, for example).  You have a pacemaker or an AICD (automatic implanted cardiac defibrillator). If you do, please bring the ID card with you on the day of surgery.  People who smoke have a higher risk of infection after surgery. Ask your doctor how you can quit smoking.  If you Primary Doctor is not within the [facility] system, you will need to have your pre-op physical faxed to us to be scanned into your chart.  [place]: [phone]  or [phone]  [facility]): [phone]  [facility] ([facility]): [phone]  [facility]): [phone]  Schedule to complete pre-procedure COVID-19 Testing    - All patients MUST get tested for COVID-19. Your test needs to happen 2 to 4 days before you check in to the hospital or surgery site.  - A clinic scheduler will assist you in scheduling a COVID-19 test or give you instructions on the appropriate COVID-19 test needed prior to your procedure. At this time, outpatients (patients not staying overnight in the hospital) are approved to complete an at home rapid test. No more than 2 days prior to surgery, complete the rapid at home test and take a photo of the negative test result.  You may send the result via [employer] or bring the image along to surgery check in.  - After the test, please stay at home and stay out of contact with other people. It will be harder for you to recover if you get COVID-19 before your treatment.  Call your insurance company. Ask if you need pre-approval for your surgery. If you do not have insurance, please let us know. If you wish to speak to the , please alert the clinic staff so this can be arranged.  Arrange for someone to drive you home after surgery.  will need to be a responsible adult (18 years or older) that will provide transportation to and from surgery and stay in the waiting room during your surgery. You may not drive yourself or take public transportation to and from surgery.  Arrange for someone to stay with you for 24 hours after you go home. This person must be a responsible adult (18 years or older).  Call your surgeon or their nurse if there is any change in your health (cold, flu, infections, hospitalizations).  Do not smoke, drink alcohol, or take over-the-counter medicine for 24 hours before and after surgery.  If you take prescribed drugs, you may need to stop them until after the surgery.  Discuss  what medications to take or not take prior to surgery with your Primary Doctor at your pre-op physical. Avoid over-the-counter blood-thinning medications such as Aspirin, Ibuprofen, vitamin E, or fish oil 7 days prior to surgery (unless otherwise directed by your Primary Doctor). Tylenol is a good alternative for mild pain relief prior to surgery.  Eating and drinking guidelines prior to surgery:  Stop all solid food consumption 8 hours prior to surgery  You may drink clear liquids up to 2 hours prior to surgery (water, fruit juices without pulp, jello, tea/coffee without creamer, sports drinks, clear-fat free broth (bouillon or consomme), popsicles (without milk, bits of fruit, or seeds/nuts)  Follow instructions given for showering or bathing before surgery.    Use 8 ounces of antiseptic surgical soap, like:  Hibiclens, Scrub Care, or Exidine  You can find it at your local pharmacy, clinic, or retail store. If you have trouble, ask your pharmacist to help you find the right substitute.  Please wash with one of the above soaps twice before coming to the hospital for your surgery. This will decrease bacteria (germs) on your skin. It will also help reduce your chance of infection after surgery.  Items you will need for showerin newly washed washcloths  2 newly washed towels  8 ounces of one of the above soaps  Following these instructions:  The evening before surgery: Shower or bathe as you normally would, using your regular soap and a clean washcloth. Give special attention to places where your incision (surgical cut) or catheters will be. This includes your groin area. Rinse well. You may wash your hair with your regular shampoo. Next, wash your body with 4 ounces of the antiseptic soap. Use a clean, damp washcloth and gently clean your body (from the chin down). If your surgery involves your head, use the special soap on your head and scalp. Rinse well and dry off using a newly washed towel.  The morning of  surgery: Repeat the same process as the evening shower.  Other suggestions:  Do not shave within 12 inches of your incision (surgical cut) area for at least 3 days before surgery. Shaving can make small cuts in the skin. This puts you at higher risk of infection.  Wear freshly washed pajamas or clothing after your evening shower.  Wear freshly washed clothes the day of surgery.  Wash and change your bed sheets the day before surgery to have clean bed sheets after your shower and when you get home from surgery.  If you have trouble washing all areas, make sure someone helps you.  Don't use any deodorant, lotion or powder after your shower.  Women who are menstruating should wear a fresh sanitary pad to the hospital.  Do not wear or add deodorant, cologne, lotion, makeup, nail polish or jewelry to surgery. If you wear fake nails, please remove at least one nail before coming to surgery (an oxygen monitor needs to be placed on your finger during surgery).  Bring these items to the surgery center/hospital:  Insurance card  Money for parking and co-pays, if needed  A list of all the medicines you take. Include vitamins, minerals, herbs, and over-the-counter drugs.  Note any drug allergies.  A copy of your advance health care directive, if you have one. This tells us what treatment you would want--and who would make health care decisions--if you could no longer speak for yourself. You may request this form in advance or download it from [email or web address].  A case for glasses, contact lenses, hearing aids, or dentures.  Your inhaler or CPAP machine, if you use these at home.  Leave extra cash, jewelry, and other valuables at home.  When you arrive  When you get to the surgery center/hospital, you will:  Check in. If you are under age 18, you must be with a parent or legal guardian.  Sign consent forms, if you haven t already. These forms state that you know the risks and benefits of surgery. When you sign the forms,  you give us permission to do the surgery. Do not sign them unless you understand what will happen during and after your surgery. If you have any questions about your surgery, ask to speak with your doctor before you sign the forms. If you don t understand the answers, ask again.  Receive a copy of the Patient s Bill of Rights. If you do not receive a copy, please ask for one.  Change into hospital clothes. Your belongings will be placed in a bag. We will return them to you after surgery.  Meet with the anesthesia provider. He or she will tell you what kind of anesthesia (medicine) will be used to keep you comfortable during surgery.  Remember: it s okay to remind doctors and nurses to wash their hands before touching you.  In most cases, your surgeon will use a marker to write his or her initials on the surgery site. This ensures that the exact site is operated on.  For safety reasons, we will ask you the same questions many times. For example, we may ask your name and birth date over and over again.  Friends and family can stay with you until it s time for surgery. While you re in surgery, they will be in the waiting area. Please note that cell phones are not allowed in some patient care areas.  If you have questions about what will happen in the operating room, talk to your care team.  After surgery  We will move you to a recovery room, where we will watch you closely. If you have any pain or discomfort, tell your nurse. He or she will try to make you comfortable.  If you are staying overnight, we will move you to your hospital room after you are awake.  If you are going home, we will move you to another room. Friends and family may be able to join you. The length of time you spend in recovery depend on the type of medicine you received, your medical condition, the type of surgery you had, or your response to the anesthesia given during your procedure.  When you are discharged from the recovery room, the nurses  will review instructions with you and your caregiver.  Please wash your hands every time you touch the wound or change bandages or dressings.  Do not submerge the wound in water.  You may not use a bathtub or hot tub until the wound is closed. The wait time frame is generally 2-3 weeks, but any open area can be a source of incoming bacteria, so it is better to be on the safe side and avoid water submersion until your wound is fully healed.  You may take a shower 24 hours after surgery. Double check with your surgeon if it is OK for water to run over the wound, whether it has been sutured, stapled, glued, or is open. You may gently wash the wound using the antiseptic soap provided for your pre-surgery showering (do not use a washcloth). Any mild soap will work as well.  Many surgical wounds will have small white strips of tape on them called steri-strips.  Do not remove these. The edges will curl and fall off within 7-10 days with normal showering.  If you are going home with sutures (stitches) or staples, you must return to the clinic to have them taken out, usually within 1-2 weeks. Some stitches are dissolvable and do not require removal. Make sure to clarify with your surgeon or surgery nurse reviewing discharge paperwork what kind of sutures you have.  Signs and symptoms of infection include:  Fever, temperature over 101.5   F  Redness  Swelling  Increased pain  Green or yellow drainage which may or may not have a foul odor  Dealing with pain  A nurse will check your comfort level often during your stay. He or she will work with you to manage your pain.  Remember:  All pain is real. There are many ways to control pain. We can help you decide what works best for you.  Ask for pain medicine when you need it. Don t try to  tough it out --this can make you feel worse. Always take your medicine as ordered.  Medicine doesn t work the same for everyone. If your medicine isn t working, tell your nurse. There may be  other medicines or treatments we can try.  Going home  We will let you know when you re ready to leave the surgery center or hospital. Before you leave, we will tell you how to care for yourself at home and prevent infections. If you do not understand something, please say so. We will answer any questions you have. We will then help you get ready to leave.  Remember, you must have a responsible adult (18 years or older) to stay with you 24 hours after you leave the hospital.  Take it easy when you get home. You will need some time to recover--you may be more tired than you realize at first. Rest and relax for at least the first 24 hours at home. You ll feel better and heal faster if you take good care of yourself.  Follow the discharge instructions that are given to you when you leave the surgery center or hospital  Please call the clinic if you experience any problems during regular clinic hours (Monday-Friday 8:00am-5:00pm).  If you experience problems during non-clinic hours, please call the [facility] on-call line at [phone] and ask the  to page the on-call Provider for your specialty. The on-call Provider will call you back and can triage your symptoms and further advise. If you are having an emergency, always call 911 or seek immediate evaluation at the Emergency Room.  Locations  [facility]  Same-day surgery center - 2nd floor, check-in #5  [address] [place] [address]  [phone]  [email or web address] ([facility])  [address] [place] [address]  [phone]   [email or web address]    [address] [address]  [phone] (patient registration)  [phone] (main line)  [email or web address]  [facility],  [facility]  [address] [place] [address]  [facility], 3rd floor for check-in  [phone] (patient registration)  [phone] (main line)  [email or web address]  [facility]  [address] [place] [address]  [phone]  [email or web address]  [facility]  [address] [place] [address]  [phone]  [email or web address]  [facility]  [address] [place] [address]  [phone]  [email or web address]  [facility]  [address] [place] [address]  [phone]  [email or web address]  [facility]  [address] [place] [place] [address]  [phone]  [email or web address]  [facility]  [address] [place] [address]  [phone]  [email or web address]

## 2022-09-09 ENCOUNTER — PREP FOR PROCEDURE (OUTPATIENT)
Dept: OTOLARYNGOLOGY | Facility: CLINIC | Age: 52
End: 2022-09-09

## 2022-09-09 DIAGNOSIS — E21.3 HYPERPARATHYROIDISM (H): Primary | ICD-10-CM

## 2022-09-09 RX ORDER — DEXAMETHASONE SODIUM PHOSPHATE 4 MG/ML
10 INJECTION, SOLUTION INTRA-ARTICULAR; INTRALESIONAL; INTRAMUSCULAR; INTRAVENOUS; SOFT TISSUE ONCE
Status: CANCELLED | OUTPATIENT
Start: 2022-09-09 | End: 2022-09-09

## 2022-09-12 ENCOUNTER — TELEPHONE (OUTPATIENT)
Dept: SURGERY | Facility: CLINIC | Age: 52
End: 2022-09-12

## 2022-09-12 NOTE — TELEPHONE ENCOUNTER
Called patient and scheduled surgery for 11/1 in [place].    H&P with PCP.    COVID test will be done 1-2 days before the procedure and will bring a picture of the results the morning of.    Post-op scheduled for 12/1 - Dr. Villagomez does not have time before 12/1 for post-ops. Please move the patient if post-op is needed sooner than 12/1.    Surg packet given in clinic.    No further questions/concerns at this time.

## 2022-09-13 NOTE — TELEPHONE ENCOUNTER
Patient called in wondering if he could move his surgery to 10/18. Moved surgery.    No further questions/concerns at this time.

## 2022-10-09 ENCOUNTER — HEALTH MAINTENANCE LETTER (OUTPATIENT)
Age: 52
End: 2022-10-09

## 2022-10-11 ENCOUNTER — OFFICE VISIT (OUTPATIENT)
Dept: FAMILY MEDICINE | Facility: CLINIC | Age: 52
End: 2022-10-11
Payer: COMMERCIAL

## 2022-10-11 VITALS
HEIGHT: 74 IN | RESPIRATION RATE: 16 BRPM | DIASTOLIC BLOOD PRESSURE: 70 MMHG | BODY MASS INDEX: 27.21 KG/M2 | SYSTOLIC BLOOD PRESSURE: 110 MMHG | HEART RATE: 68 BPM | OXYGEN SATURATION: 98 % | TEMPERATURE: 96.7 F | WEIGHT: 212 LBS

## 2022-10-11 DIAGNOSIS — E21.3 HYPERPARATHYROIDISM (H): ICD-10-CM

## 2022-10-11 DIAGNOSIS — Z01.818 PRE-OPERATIVE EXAMINATION: Primary | ICD-10-CM

## 2022-10-11 PROCEDURE — 99214 OFFICE O/P EST MOD 30 MIN: CPT | Performed by: FAMILY MEDICINE

## 2022-10-11 ASSESSMENT — PAIN SCALES - GENERAL: PAINLEVEL: NO PAIN (0)

## 2022-10-11 NOTE — PROGRESS NOTES
[facility]  [address] [facility] [place] [address]  Phone: [phone]  Primary Provider: Branden Esquivel  Pre-op Performing Provider: YONI WIN    {Provider  Link to PREOP SmartSet  Use this to apply standard patient instructions to AVS; includes medication directions, common orders, guidelines for anemia, warfarin, additional testing   :[id number]}  PREOPERATIVE EVALUATION:  Today's date: 10/11/2022    Franklin Mejia is a 52 year old male who presents for a preoperative evaluation.    Surgical Information:  Surgery/Procedure: PARATHYROIDECTOMY  Surgery Location: [facility] OR   Surgeon: Dr. Villagomez   Surgery Date: 10/18/2022  Time of Surgery: 12:50 pm   Where patient plans to recover: At home with family  Fax number for surgical facility: Note does not need to be faxed, will be available electronically in Epic.    Type of Anesthesia Anticipated: General    Assessment & Plan     The proposed surgical procedure is considered INTERMEDIATE risk.    Pre-operative examination  Mets>4   No chest pain or shortness of breath at rest or with activity.       Hyperparathyroidism (H)  Scheduled for above surgery.       Medication Instructions:   - ibuprofen (Advil, Motrin): HOLD atleast 1 day before surgery.     RECOMMENDATION:  APPROVAL GIVEN to proceed with proposed procedure, without further diagnostic evaluation.    The risks, benefits and treatment options of prescribed medications or other treatments have been discussed with the patient. The patient verbalized their understanding and should call or follow up if no improvement or if they develop further problems.    Yoni Win DO        Subjective     HPI related to upcoming procedure:     Follows with Endocrinology.   Has Hyperparathyroidism.     Mets>4   No chest pain or shortness of breath at rest or with activity.     No history of chronic lung or heart conditions.   No history of blood clots.   No issues with anesthesia.        Preop Questions 10/11/2022   1. Have you ever had a heart attack or stroke? No   2. Have you ever had surgery on your heart or blood vessels, such as a stent placement, a coronary artery bypass, or surgery on an artery in your head, neck, heart, or legs? No   3. Do you have chest pain with activity? No   4. Do you have a history of  heart failure? No   5. Do you currently have a cold, bronchitis or symptoms of other infection? No   6. Do you have a cough, shortness of breath, or wheezing? No   7. Do you or anyone in your family have previous history of blood clots? No   8. Do you or does anyone in your family have a serious bleeding problem such as prolonged bleeding following surgeries or cuts? No   9. Have you ever had problems with anemia or been told to take iron pills? No   10. Have you had any abnormal blood loss such as black, tarry or bloody stools? No   11. Have you ever had a blood transfusion? No   12. Are you willing to have a blood transfusion if it is medically needed before, during, or after your surgery? Yes   13. Have you or any of your relatives ever had problems with anesthesia? No   14. Do you have sleep apnea, excessive snoring or daytime drowsiness? No   15. Do you have any artifical heart valves or other implanted medical devices like a pacemaker, defibrillator, or continuous glucose monitor? No   16. Do you have artificial joints? No   17. Are you allergic to latex? No     Health Care Directive:  Patient does not have a Health Care Directive or Living Will: Discussed advance care planning with patient; information given to patient to review. given in the past.     Preoperative Review of :   reviewed - no record of controlled substances prescribed.        Review of Systems  CONSTITUTIONAL: NEGATIVE for fever, chills, change in weight  INTEGUMENTARY/SKIN: NEGATIVE for worrisome rashes, moles or lesions  EYES: NEGATIVE for vision changes or irritation  ENT/MOUTH: NEGATIVE for ear,  mouth and throat problems  RESP: NEGATIVE for significant cough or SOB  CV: NEGATIVE for chest pain, palpitations or peripheral edema  GI: NEGATIVE for nausea, abdominal pain, heartburn, or change in bowel habits  : NEGATIVE for frequency, dysuria, or hematuria  MUSCULOSKELETAL: NEGATIVE for significant arthralgias or myalgia  NEURO: NEGATIVE for weakness, dizziness or paresthesias  ENDOCRINE: NEGATIVE for temperature intolerance, skin/hair changes  HEME: NEGATIVE for bleeding problems  PSYCHIATRIC: NEGATIVE for changes in mood or affect    Patient Active Problem List    Diagnosis Date Noted     Right elbow pain 06/07/2022     Priority: Medium     Hyperparathyroidism (H) 03/17/2022     Priority: Medium     TMJ (temporomandibular joint syndrome) 09/09/2021     Priority: Medium     Nephrolithiasis 01/2020     Priority: Medium     Multiple benign nevi 07/12/2019     Priority: Medium     Vitamin D deficiency 07/12/2019     Priority: Medium     Overweight (BMI 25.0-29.9) 12/17/2015     Priority: Medium     Gastroesophageal reflux disease, esophagitis presence not specified 11/13/2013     Priority: Medium     IMO Regulatory Load OCT 2020        Past Medical History:   Diagnosis Date     Nephrolithiasis 10/2020     Past Surgical History:   Procedure Laterality Date     APPENDECTOMY  1983     Current Outpatient Medications   Medication Sig Dispense Refill     vitamin D3 (CHOLECALCIFEROL) 50 mcg (2000 units) tablet Take 1 tablet by mouth daily         Allergies   Allergen Reactions     Pcn [Penicillins]         Social History     Tobacco Use     Smoking status: Former     Smokeless tobacco: Former     Types: Chew     Quit date: 9/1/2018   Substance Use Topics     Alcohol use: Yes     Alcohol/week: 0.0 standard drinks     Comment: occasional beer - 0-3 peer week      History   Drug Use No         Objective     /70 (BP Location: Right arm, Patient Position: Chair, Cuff Size: Adult Regular)   Pulse 68   Temp (!) 96.7  " F (35.9  C) (Tympanic)   Resp 16   Ht 1.88 m (6' 2\")   Wt 96.2 kg (212 lb)   SpO2 98%   BMI 27.22 kg/m      Physical Exam    GENERAL APPEARANCE: healthy, alert and no distress     EYES: EOMI,  PERRL     HENT: ear canals and TM's normal and nose and mouth without ulcers or lesions     NECK: no adenopathy, no asymmetry, masses, or scars and thyroid normal to palpation     RESP: lungs clear to auscultation - no rales, rhonchi or wheezes     CV: regular rates and rhythm, normal S1 S2, no S3 or S4 and no murmur, click or rub     ABDOMEN:  soft, nontender, no HSM or masses and bowel sounds normal     MS: extremities normal- no gross deformities noted, no evidence of inflammation in joints, FROM in all extremities.     SKIN: no suspicious lesions or rashes     NEURO: Normal strength and tone, sensory exam grossly normal, mentation intact and speech normal     PSYCH: mentation appears normal. and affect normal/bright     LYMPHATICS: No cervical adenopathy    Recent Labs   Lab Test 08/23/22  1529 03/16/22  1731 01/07/22  1404 09/09/21  0657   HGB  --   --  13.7 14.4   PLT  --   --  474* 192    140 142 143   POTASSIUM 4.1 3.9 4.1 4.2   CR 0.97 0.86 0.85 0.81        Diagnostics:  No labs were ordered during this visit.   No EKG required, no history of coronary heart disease, significant arrhythmia, peripheral arterial disease or other structural heart disease.     Revised Cardiac Risk Index (RCRI):  The patient has the following serious cardiovascular risks for perioperative complications:   - No serious cardiac risks = 0 points     RCRI Interpretation: 0 points: Class I (very low risk - 0.4% complication rate)           Signed Electronically by: Luis Blanco DO  Copy of this evaluation report is provided to requesting physician.      "

## 2022-10-11 NOTE — PATIENT INSTRUCTIONS
Best of luck with upcoming procedure.     Preparing for Your Surgery  Getting started  A nurse will call you to review your health history and instructions. They will give you an arrival time based on your scheduled surgery time. Please be ready to share:  Your doctor's clinic name and phone number  Your medical, surgical and anesthesia history  A list of allergies and sensitivities  A list of medicines, including herbal treatments and over-the-counter drugs  Whether the patient has a legal guardian (ask how to send us the papers in advance)  Please tell us if you're pregnant--or if there's any chance you might be pregnant. Some surgeries may injure a fetus (unborn baby), so they require a pregnancy test. Surgeries that are safe for a fetus don't always need a test, and you can choose whether to have one.   If you have a child who's having surgery, please ask for a copy of Preparing for Your Child's Surgery.    Preparing for surgery  Within 10 to 30 days of surgery: Have a pre-op exam (sometimes called an H&P, or History and Physical). This can be done at a clinic or pre-operative center.  If you're having a , you may not need this exam. Talk to your care team.  At your pre-op exam, talk to your care team about all medicines you take. If you need to stop any medicines before surgery, ask when to start taking them again.  We do this for your safety. Many medicines can make you bleed too much during surgery. Some change how well surgery (anesthesia) drugs work.  Call your insurance company to let them know you're having surgery. (If you don't have insurance, call [phone].)  Call your clinic if there's any change in your health. This includes signs of a cold or flu (sore throat, runny nose, cough, rash, fever). It also includes a scrape or scratch near the surgery site.  If you have questions on the day of surgery, call your hospital or surgery center.  COVID testing  You may need to be tested for  COVID-19 before having surgery. If so, we will give you instructions (or click here).  Eating and drinking guidelines  For your safety: Unless your surgeon tells you otherwise, follow the guidelines below.  Eat and drink as usual until 8 hours before surgery. After that, no food or milk.  Drink clear liquids until 2 hours before surgery. These are liquids you can see through, like water, Gatorade and Propel Water. You may also have black coffee and tea (no cream or milk).  Nothing by mouth within 2 hours of surgery. This includes gum, candy and breath mints.  If you drink alcohol: Stop drinking it the night before surgery.  If your care team tells you to take medicine on the morning of surgery, it's okay to take it with a sip of water.  Preventing infection  Shower or bathe the night before and morning of your surgery. Follow the instructions your clinic gave you. (If no instructions, use regular soap.)  Don't shave or clip hair near your surgery site. We'll remove the hair if needed.  Don't smoke or vape the morning of surgery. You may chew nicotine gum up to 2 hours before surgery. A nicotine patch is okay.  Note: Some surgeries require you to completely quit smoking and nicotine. Check with your surgeon.  Your care team will make every effort to keep you safe from infection. We will:  Clean our hands often with soap and water (or an alcohol-based hand rub).  Clean the skin at your surgery site with a special soap that kills germs.  Give you a special gown to keep you warm. (Cold raises the risk of infection.)  Wear special hair covers, masks, gowns and gloves during surgery.  Give antibiotic medicine, if prescribed. Not all surgeries need antibiotics.  What to bring on the day of surgery  Photo ID and insurance card  Copy of your health care directive, if you have one  Glasses and hearing aides (bring cases)  You can't wear contacts during surgery  Inhaler and eye drops, if you use them (tell us about these when  you arrive)  CPAP machine or breathing device, if you use them  A few personal items, if spending the night  If you have . . .  A pacemaker, ICD (cardiac defibrillator) or other implant: Bring the ID card.  An implanted stimulator: Bring the remote control.  A legal guardian: Bring a copy of the certified (court-stamped) guardianship papers.  Please remove any jewelry, including body piercings. Leave jewelry and other valuables at home.  If you're going home the day of surgery  You must have a responsible adult drive you home. They should stay with you overnight as well.  If you don't have someone to stay with you, and you aren't safe to go home alone, we may keep you overnight. Insurance often won't pay for this.  After surgery  If it's hard to control your pain or you need more pain medicine, please call your surgeon's office.  Questions?   If you have any questions for your care team, list them here: _________________________________________________________________________________________________________________________________________________________________________ ____________________________________ ____________________________________ ____________________________________  For informational purposes only. Not to replace the advice of your health care provider. Copyright   2003, 2019 [facility]. All rights reserved. Clinically reviewed by Jen Patrick MD. [employer] [id number] - REV 07/22.

## 2022-10-15 NOTE — PROGRESS NOTES
"SURGERY CLINIC CONSULTATION    REASON FOR CONSULTATION:  Franklin Mejia was referred by Dr. Irizarry for evaluation and discussion of treatment options for hyperparathyroidism     HISTORY OF PRESENT ILLNESS:  Franklin Mejia is a 52 year old male who was noted to have kidney stones in 2019. Work up confirmed hypercalcemia. Most recent labs from Aug 2022 include: Ca 10.9, , UCa 680mg/24 hrs. [facility] Med parathyroid scan localized to right inferior position.    Symptoms associated with HPTH include fatigue and nephrolithiasis. No fractures      REVIEW OF SYSTEMS:  ROS EXAM: 10pt ROS pertinent for that noted in HPI  Patient Active Problem List   Diagnosis     Gastroesophageal reflux disease, esophagitis presence not specified     Overweight (BMI 25.0-29.9)     Multiple benign nevi     Vitamin D deficiency     Nephrolithiasis     TMJ (temporomandibular joint syndrome)     Hyperparathyroidism (H)     Right elbow pain       Past Surgical History:   Procedure Laterality Date     APPENDECTOMY         Allergies   Allergen Reactions     Pcn [Penicillins]        Medications reviewed in EMR        Family History   Problem Relation Age of Onset     Lipids Mother      Hypertension Father          copd 81     Macular Degeneration Father      Diabetes Father      Chronic Obstructive Pulmonary Disease Father      Cerebrovascular Disease Paternal Half-Brother      Diabetes Paternal Half-Brother      Thyroid Disease No family hx of      Glaucoma No family hx of      Cancer No family hx of         PHYSICAL EXAM:  /86   Pulse 74   Ht 1.88 m (6' 2\")   Wt 95.2 kg (209 lb 14.4 oz)   SpO2 100%   BMI 26.95 kg/m      Neck: no palpable masses. Thyroid gland WNL    I personally reviewed the radiographic images and laboratory data    ASSESSMENT:   1. Primary hyperparathyroidism (H)        PLAN: I recommend neck exploration resection parathyroid adenoma. The procedure and risks were discussed with the patient " including, but not limited to bleeding, infection, injury to the RLN(s), permanent hypocalcemia or missed adenoma.    Judit Villagomez MD

## 2022-10-16 ENCOUNTER — LAB (OUTPATIENT)
Dept: FAMILY MEDICINE | Facility: CLINIC | Age: 52
End: 2022-10-16
Payer: COMMERCIAL

## 2022-10-16 DIAGNOSIS — Z20.822 ENCOUNTER FOR LABORATORY TESTING FOR COVID-19 VIRUS: ICD-10-CM

## 2022-10-16 PROCEDURE — 99207 PR NO CHARGE LOS: CPT

## 2022-10-16 PROCEDURE — U0005 INFEC AGEN DETEC AMPLI PROBE: HCPCS

## 2022-10-16 PROCEDURE — U0003 INFECTIOUS AGENT DETECTION BY NUCLEIC ACID (DNA OR RNA); SEVERE ACUTE RESPIRATORY SYNDROME CORONAVIRUS 2 (SARS-COV-2) (CORONAVIRUS DISEASE [COVID-19]), AMPLIFIED PROBE TECHNIQUE, MAKING USE OF HIGH THROUGHPUT TECHNOLOGIES AS DESCRIBED BY CMS-2020-01-R: HCPCS

## 2022-10-17 ENCOUNTER — ANESTHESIA EVENT (OUTPATIENT)
Dept: SURGERY | Facility: AMBULATORY SURGERY CENTER | Age: 52
End: 2022-10-17
Payer: COMMERCIAL

## 2022-10-17 LAB — SARS-COV-2 RNA RESP QL NAA+PROBE: NEGATIVE

## 2022-10-18 ENCOUNTER — ANESTHESIA (OUTPATIENT)
Dept: SURGERY | Facility: AMBULATORY SURGERY CENTER | Age: 52
End: 2022-10-18
Payer: COMMERCIAL

## 2022-10-18 ENCOUNTER — TELEPHONE (OUTPATIENT)
Dept: SURGERY | Facility: CLINIC | Age: 52
End: 2022-10-18

## 2022-10-18 ENCOUNTER — HOSPITAL ENCOUNTER (OUTPATIENT)
Facility: AMBULATORY SURGERY CENTER | Age: 52
Discharge: HOME | End: 2022-10-18
Attending: SURGERY
Payer: COMMERCIAL

## 2022-10-18 VITALS
OXYGEN SATURATION: 99 % | SYSTOLIC BLOOD PRESSURE: 140 MMHG | HEIGHT: 74 IN | DIASTOLIC BLOOD PRESSURE: 90 MMHG | WEIGHT: 210 LBS | RESPIRATION RATE: 16 BRPM | TEMPERATURE: 97.2 F | HEART RATE: 85 BPM | BODY MASS INDEX: 26.95 KG/M2

## 2022-10-18 DIAGNOSIS — E21.3 HYPERPARATHYROIDISM (H): Primary | ICD-10-CM

## 2022-10-18 LAB
PTH-INTACT SERPL-MCNC: 18 PG/ML (ref 15–65)
PTH-INTACT SERPL-MCNC: 229 PG/ML (ref 15–65)
PTH-INTACT SERPL-MCNC: 51 PG/ML (ref 15–65)

## 2022-10-18 PROCEDURE — 88331 PATH CONSLTJ SURG 1 BLK 1SPC: CPT | Mod: 26 | Performed by: PATHOLOGY

## 2022-10-18 PROCEDURE — 60500 EXPLORE PARATHYROID GLANDS: CPT

## 2022-10-18 PROCEDURE — 88305 TISSUE EXAM BY PATHOLOGIST: CPT | Mod: TC | Performed by: SURGERY

## 2022-10-18 PROCEDURE — 88305 TISSUE EXAM BY PATHOLOGIST: CPT | Mod: 26 | Performed by: PATHOLOGY

## 2022-10-18 PROCEDURE — 83970 ASSAY OF PARATHORMONE: CPT | Performed by: PATHOLOGY

## 2022-10-18 PROCEDURE — 60500 EXPLORE PARATHYROID GLANDS: CPT | Mod: GC | Performed by: SURGERY

## 2022-10-18 RX ORDER — ACETAMINOPHEN 325 MG/1
975 TABLET ORAL ONCE
Status: COMPLETED | OUTPATIENT
Start: 2022-10-18 | End: 2022-10-18

## 2022-10-18 RX ORDER — LIDOCAINE HYDROCHLORIDE 20 MG/ML
INJECTION, SOLUTION INFILTRATION; PERINEURAL PRN
Status: DISCONTINUED | OUTPATIENT
Start: 2022-10-18 | End: 2022-10-18

## 2022-10-18 RX ORDER — DEXAMETHASONE SODIUM PHOSPHATE 10 MG/ML
10 INJECTION, SOLUTION INTRAMUSCULAR; INTRAVENOUS ONCE
Status: DISCONTINUED | OUTPATIENT
Start: 2022-10-18 | End: 2022-10-18 | Stop reason: HOSPADM

## 2022-10-18 RX ORDER — ONDANSETRON 2 MG/ML
INJECTION INTRAMUSCULAR; INTRAVENOUS PRN
Status: DISCONTINUED | OUTPATIENT
Start: 2022-10-18 | End: 2022-10-18

## 2022-10-18 RX ORDER — PROPOFOL 10 MG/ML
INJECTION, EMULSION INTRAVENOUS CONTINUOUS PRN
Status: DISCONTINUED | OUTPATIENT
Start: 2022-10-18 | End: 2022-10-18

## 2022-10-18 RX ORDER — MEPERIDINE HYDROCHLORIDE 25 MG/ML
12.5 INJECTION INTRAMUSCULAR; INTRAVENOUS; SUBCUTANEOUS
Status: DISCONTINUED | OUTPATIENT
Start: 2022-10-18 | End: 2022-10-19 | Stop reason: HOSPADM

## 2022-10-18 RX ORDER — ONDANSETRON 4 MG/1
4 TABLET, ORALLY DISINTEGRATING ORAL EVERY 30 MIN PRN
Status: DISCONTINUED | OUTPATIENT
Start: 2022-10-18 | End: 2022-10-19 | Stop reason: HOSPADM

## 2022-10-18 RX ORDER — FENTANYL CITRATE 50 UG/ML
INJECTION, SOLUTION INTRAMUSCULAR; INTRAVENOUS PRN
Status: DISCONTINUED | OUTPATIENT
Start: 2022-10-18 | End: 2022-10-18

## 2022-10-18 RX ORDER — SODIUM CHLORIDE, SODIUM LACTATE, POTASSIUM CHLORIDE, CALCIUM CHLORIDE 600; 310; 30; 20 MG/100ML; MG/100ML; MG/100ML; MG/100ML
INJECTION, SOLUTION INTRAVENOUS CONTINUOUS
Status: DISCONTINUED | OUTPATIENT
Start: 2022-10-18 | End: 2022-10-18 | Stop reason: HOSPADM

## 2022-10-18 RX ORDER — POLYETHYLENE GLYCOL 3350 17 G/17G
1 POWDER, FOR SOLUTION ORAL DAILY
Qty: 510 G | Refills: 1 | Status: SHIPPED | OUTPATIENT
Start: 2022-10-18 | End: 2023-06-06

## 2022-10-18 RX ORDER — SODIUM CHLORIDE, SODIUM LACTATE, POTASSIUM CHLORIDE, CALCIUM CHLORIDE 600; 310; 30; 20 MG/100ML; MG/100ML; MG/100ML; MG/100ML
INJECTION, SOLUTION INTRAVENOUS CONTINUOUS
Status: DISCONTINUED | OUTPATIENT
Start: 2022-10-18 | End: 2022-10-19 | Stop reason: HOSPADM

## 2022-10-18 RX ORDER — OXYCODONE HYDROCHLORIDE 5 MG/1
5 TABLET ORAL EVERY 4 HOURS PRN
Status: DISCONTINUED | OUTPATIENT
Start: 2022-10-18 | End: 2022-10-19 | Stop reason: HOSPADM

## 2022-10-18 RX ORDER — LIDOCAINE 40 MG/G
CREAM TOPICAL
Status: DISCONTINUED | OUTPATIENT
Start: 2022-10-18 | End: 2022-10-18 | Stop reason: HOSPADM

## 2022-10-18 RX ORDER — FENTANYL CITRATE 50 UG/ML
25 INJECTION, SOLUTION INTRAMUSCULAR; INTRAVENOUS
Status: DISCONTINUED | OUTPATIENT
Start: 2022-10-18 | End: 2022-10-19 | Stop reason: HOSPADM

## 2022-10-18 RX ORDER — FENTANYL CITRATE 50 UG/ML
25 INJECTION, SOLUTION INTRAMUSCULAR; INTRAVENOUS EVERY 5 MIN PRN
Status: DISCONTINUED | OUTPATIENT
Start: 2022-10-18 | End: 2022-10-18 | Stop reason: HOSPADM

## 2022-10-18 RX ORDER — ONDANSETRON 2 MG/ML
4 INJECTION INTRAMUSCULAR; INTRAVENOUS EVERY 30 MIN PRN
Status: DISCONTINUED | OUTPATIENT
Start: 2022-10-18 | End: 2022-10-19 | Stop reason: HOSPADM

## 2022-10-18 RX ORDER — OXYCODONE HYDROCHLORIDE 5 MG/1
5-10 TABLET ORAL EVERY 4 HOURS PRN
Qty: 6 TABLET | Refills: 0 | Status: SHIPPED | OUTPATIENT
Start: 2022-10-18 | End: 2023-01-23

## 2022-10-18 RX ORDER — HYDROMORPHONE HYDROCHLORIDE 1 MG/ML
0.2 INJECTION, SOLUTION INTRAMUSCULAR; INTRAVENOUS; SUBCUTANEOUS EVERY 5 MIN PRN
Status: DISCONTINUED | OUTPATIENT
Start: 2022-10-18 | End: 2022-10-18 | Stop reason: HOSPADM

## 2022-10-18 RX ORDER — ACETAMINOPHEN 325 MG/1
650 TABLET ORAL
Status: DISCONTINUED | OUTPATIENT
Start: 2022-10-18 | End: 2022-10-19 | Stop reason: HOSPADM

## 2022-10-18 RX ORDER — ACETAMINOPHEN 325 MG/1
650 TABLET ORAL EVERY 4 HOURS PRN
Qty: 50 TABLET | Refills: 0 | Status: SHIPPED | OUTPATIENT
Start: 2022-10-18 | End: 2023-06-06

## 2022-10-18 RX ORDER — PROPOFOL 10 MG/ML
INJECTION, EMULSION INTRAVENOUS PRN
Status: DISCONTINUED | OUTPATIENT
Start: 2022-10-18 | End: 2022-10-18

## 2022-10-18 RX ADMIN — OXYCODONE HYDROCHLORIDE 5 MG: 5 TABLET ORAL at 14:34

## 2022-10-18 RX ADMIN — PROPOFOL 150 MG: 10 INJECTION, EMULSION INTRAVENOUS at 13:25

## 2022-10-18 RX ADMIN — ACETAMINOPHEN 975 MG: 325 TABLET ORAL at 11:26

## 2022-10-18 RX ADMIN — LIDOCAINE HYDROCHLORIDE 60 MG: 20 INJECTION, SOLUTION INFILTRATION; PERINEURAL at 13:25

## 2022-10-18 RX ADMIN — SODIUM CHLORIDE, SODIUM LACTATE, POTASSIUM CHLORIDE, CALCIUM CHLORIDE: 600; 310; 30; 20 INJECTION, SOLUTION INTRAVENOUS at 11:28

## 2022-10-18 RX ADMIN — ONDANSETRON 4 MG: 2 INJECTION INTRAMUSCULAR; INTRAVENOUS at 13:13

## 2022-10-18 RX ADMIN — PROPOFOL 200 MCG/KG/MIN: 10 INJECTION, EMULSION INTRAVENOUS at 13:21

## 2022-10-18 RX ADMIN — FENTANYL CITRATE 100 MCG: 50 INJECTION, SOLUTION INTRAMUSCULAR; INTRAVENOUS at 13:21

## 2022-10-18 ASSESSMENT — LIFESTYLE VARIABLES: TOBACCO_USE: 0

## 2022-10-18 ASSESSMENT — COPD QUESTIONNAIRES: COPD: 0

## 2022-10-18 ASSESSMENT — ENCOUNTER SYMPTOMS: ORTHOPNEA: 0

## 2022-10-18 NOTE — OR NURSING
Patient noticed ache/soreness on the right calf muscle as he was getting dressed to go home. Dr Nava and and I assessed the leg and no tenderness, swelling or discoloration noted. Patient is able to walk without any difficulties. Patient stated that he notices similar pain when dehydrated. Patient and his wife are advised to monitor and seek medical attention if symptoms worsen.

## 2022-10-18 NOTE — ANESTHESIA PREPROCEDURE EVALUATION
Anesthesia Pre-Procedure Evaluation    Patient: Franklin Mejia   MRN: [id number] : 1970        Procedure : Procedure(s):  PARATHYROIDECTOMY          Past Medical History:   Diagnosis Date     Nephrolithiasis 10/2020      Past Surgical History:   Procedure Laterality Date     APPENDECTOMY        Allergies   Allergen Reactions     Pcn [Penicillins]       Social History     Tobacco Use     Smoking status: Former     Smokeless tobacco: Former     Types: Chew     Quit date: 2018   Substance Use Topics     Alcohol use: Yes     Alcohol/week: 0.0 standard drinks     Comment: occasional beer - 0-3 peer week       Wt Readings from Last 1 Encounters:   10/18/22 95.3 kg (210 lb)        Anesthesia Evaluation   Pt has had prior anesthetic. Type: General.    No history of anesthetic complications       ROS/MED HX  ENT/Pulmonary:    (-) tobacco use, asthma, COPD and recent URI   Neurologic:       Cardiovascular:    (-) CHF, MUHAMMAD and orthopnea/PND   METS/Exercise Tolerance: >4 METS    Hematologic:       Musculoskeletal:       GI/Hepatic:     (+) GERD (Rare symptoms with certain foods. Asymptomatic today),     Renal/Genitourinary:       Endo: Comment: Hypercalcemia 2/2 hyperparathyroidism      Psychiatric/Substance Use:       Infectious Disease:       Malignancy:       Other:            Physical Exam    Airway        Mallampati: I   TM distance: > 3 FB   Neck ROM: full   Mouth opening: > 3 cm    Respiratory Devices and Support         Dental         B=Bridge, C=Chipped, L=Loose, M=Missing    Cardiovascular          Rhythm and rate: regular and normal     Pulmonary           breath sounds clear to auscultation           OUTSIDE LABS:  CBC:   Lab Results   Component Value Date    WBC 10.6 2022    WBC 8.1 2021    HGB 13.7 2022    HGB 14.4 2021    HCT 43.1 2022    HCT 45.1 2021     (H) 2022     2021     BMP:   Lab Results   Component Value Date      08/23/2022     03/16/2022    POTASSIUM 4.1 08/23/2022    POTASSIUM 3.9 03/16/2022    CHLORIDE 112 (H) 08/23/2022    CHLORIDE 109 03/16/2022    CO2 27 08/23/2022    CO2 27 03/16/2022    BUN 15 08/23/2022    BUN 18 03/16/2022    CR 0.97 08/23/2022    CR 0.86 03/16/2022    GLC 96 08/23/2022     (H) 03/16/2022     COAGS: No results found for: PTT, INR, FIBR  POC: No results found for: BGM, HCG, HCGS  HEPATIC:   Lab Results   Component Value Date    ALBUMIN 3.2 (L) 01/07/2022    PROTTOTAL 8.0 01/07/2022    ALT 75 (H) 01/07/2022    AST 20 01/07/2022    ALKPHOS 118 01/07/2022    BILITOTAL 0.3 01/07/2022     OTHER:   Lab Results   Component Value Date    JUAN 10.9 (H) 08/23/2022    LIPASE 71 (L) 10/15/2020    TSH 4.75 (H) 03/22/2019    T4 0.79 03/22/2019    SED 2 07/20/2009       Anesthesia Plan    ASA Status:  2   NPO Status:  NPO Appropriate    Anesthesia Type: General.     - Airway: ETT   Induction: Intravenous.   Maintenance: Balanced.        Consents    Anesthesia Plan(s) and associated risks, benefits, and realistic alternatives discussed. Questions answered and patient/representative(s) expressed understanding.    - Discussed:     - Discussed with:  Patient         Postoperative Care    Pain management: IV analgesics, Oral pain medications.   PONV prophylaxis: Ondansetron (or other 5HT-3), Dexamethasone or Solumedrol, Background Propofol Infusion     Comments:    Other Comments: Discussed risks of general anesthesia, including aspiration pneumonia, sore throat/hoarse voice, abrasions/damage to lips/tongue/teeth, nausea, rare complications (including medication reactions, cardiac, pulmonary, hypoxia/low oxygen, recall). Ensured understanding, invited questions and all questions were answered. Patient wishes to proceed.       H&P reviewed: Unable to attach H&P to encounter due to EHR limitations. H&P Update: appropriate H&P reviewed, patient examined. No interval changes since H&P (within 30 days).          Audelia Nava MD

## 2022-10-18 NOTE — ANESTHESIA POSTPROCEDURE EVALUATION
Patient: Franklin Mejia    Procedure: Procedure(s):  NECK EXPLORATION WITH RIGHT PARATHYROID ADENOMA       Anesthesia Type:  General    Note:  Disposition: Outpatient   Postop Pain Control: Uneventful            Sign Out: Well controlled pain   PONV: No   Neuro/Psych: Uneventful            Sign Out: Acceptable/Baseline neuro status   Airway/Respiratory: Uneventful            Sign Out: Acceptable/Baseline resp. status   CV/Hemodynamics: Uneventful            Sign Out: Acceptable CV status; No obvious hypovolemia; No obvious fluid overload   Other NRE: NONE   DID A NON-ROUTINE EVENT OCCUR? No    Event details/Postop Comments:  Doing well. Alert, oriented. No nausea, or problems with pain control. Does have a sore throat. Voice sounds normal. Noticed mild soreness of the right calf, which is what he feels when he gets dehydrated. No swelling, redness or tenderness to palpation. His wife is at bedside & they will monitor this - if it does not improve as it usually does with more hydration, they will seek medical attention. PACU RN at bedside for this discussion.              Last vitals:  Vitals Value Taken Time   /85 10/18/22 1445   Temp 36.3  C (97.3  F) 10/18/22 1431   Pulse 79 10/18/22 1450   Resp 16 10/18/22 1452   SpO2 96 % 10/18/22 1451   Vitals shown include unvalidated device data.    Electronically Signed By: Audelia Nava MD  October 18, 2022  3:48 PM

## 2022-10-18 NOTE — ANESTHESIA CARE TRANSFER NOTE
Patient: Franklin Mejia    Procedure: Procedure(s):  NECK EXPLORATION WITH RIGHT PARATHYROID ADENOMA       Diagnosis: Hyperparathyroidism (H) [E21.3]  Diagnosis Additional Information: No value filed.    Anesthesia Type:   General     Note:    Oropharynx: oropharynx clear of all foreign objects and spontaneously breathing  Level of Consciousness: drowsy  Oxygen Supplementation: face mask  Level of Supplemental Oxygen (L/min / FiO2): 6  Independent Airway: airway patency satisfactory and stable  Dentition: dentition unchanged  Vital Signs Stable: post-procedure vital signs reviewed and stable  Report to RN Given: handoff report given  Patient transferred to: PACU  Comments: Uneventful transport  - face mask O2 6 lpm  Pt comfortable  Report to RN - Sohail  Exchanging well; color natl  Pt responds appropriately to command  IV patent  Lips/teeth/dentition as preop status  Questions answered    Handoff Report: Identifed the Patient, Identified the Reponsible Provider, Reviewed the pertinent medical history, Discussed the surgical course, Reviewed Intra-OP anesthesia mangement and issues during anesthesia, Set expectations for post-procedure period and Allowed opportunity for questions and acknowledgement of understanding      Vitals:  Vitals Value Taken Time   /75 10/18/22 1431   Temp 36.3  C (97.3  F) 10/18/22 1431   Pulse 78 10/18/22 1432   Resp 19 10/18/22 1432   SpO2 97 % 10/18/22 1432   Vitals shown include unvalidated device data.    Electronically Signed By: JAIME VALENCIA CRNA  October 18, 2022  2:33 PM

## 2022-10-18 NOTE — TELEPHONE ENCOUNTER
Need to move appointment time for 12/0/1/22    Nicolette raymond Procedure   Dermatology, General and Plastic Surgery, Urology Specialties   [facility] and Surgery [address] [address]

## 2022-10-18 NOTE — DISCHARGE INSTRUCTIONS
[facility]  Home Care Following Anesthesia  For 24 hours after surgery:  Get plenty of rest.  A responsible adult must stay with you for at least 24 hours after you leave the surgery center.  Do not drive or use heavy equipment.  If you have weakness or tingling, don't drive or use heavy equipment until this feeling goes away.   Do not drink alcohol.   Avoid strenuous or risky activities.  Ask for help when climbing stairs.  You may feel lightheaded.  IF so, sit for a few minutes before standing.  Have someone help you get up.   If you have nausea (feel sick to your stomach): Drink only clear liquids such as apple juice, ginger ale, broth or 7-Up.  Rest may also help.  Be sure to drink enough fluids.  Move to a regular diet as you feel able.   You may have a slight fever.  Call the doctor if your fever is over 100 F (37.7 C) (taken under the tongue) or lasts longer than 24 hours.  You may have a dry mouth, a sore throat, muscle aches or trouble sleeping. These should go away after 24 hours.  Do not make important or legal decisions.   It is recommended to avoid smoking.               Tips for taking pain medications  To get the best pain relief possible, remember these points:  Take pain medications as directed, before pain becomes severe.  Pain medication can upset your stomach: taking it with food may help.  Constipation is a common side effect of pain medication. Drink plenty of  fluids.  Eat foods high in fiber. Take a stool softener if recommended by your doctor or pharmacist.  Do not drink alcohol, drive or operate machinery while taking pain medications.  Ask about other ways to control pain, such as with heat, ice or relaxation.    Tylenol/Acetaminophen Consumption  To help encourage the safe use of acetaminophen, the makers of TYLENOL  have lowered the maximum daily dose for single-ingredient Extra Strength TYLENOL  (acetaminophen) products sold in the [place] from 8 pills  per day (4,000 mg) to 6 pills per day (3,000 mg). The dosing interval has also changed from 2 pills every 4-6 hours to 2 pills every 6 hours.  If you feel your pain relief is insufficient, you may take Tylenol/Acetaminophen in addition to your narcotic pain medication.   Be careful not to exceed 3,000 mg of Tylenol/Acetaminophen in a 24 hour period from all sources.  If you are taking extra strength Tylenol/acetaminophen (500 mg), the maximum dose is 6 tablets in 24 hours.  If you are taking regular strength acetaminophen (325 mg), the maximum dose is 9 tablets in 24 hours.  You received 975 mg of Tylenol at 11:26 am today. Next dose is available at 5:26 pm as needed for pain.    Call a doctor for any of the following:  Signs of infection (fever, growing tenderness at the surgery site, a large amount of drainage or bleeding, severe pain, foul-smelling drainage, redness, swelling).  It has been over 8 to 10 hours since surgery and you are still not able to urinate (pass water).  Headache for over 24 hours.  Numbness, tingling or weakness the day after surgery (if you had spinal anesthesia).  Signs of Covid-19 infection (temperature over 100 degrees, shortness of breath, cough, loss of taste/smell, generalized body aches, persistent headache, chills, sore throat, nausea/vomiting/diarrhea)  Your doctor is:  Dr. Judit Villagomez, ENT Otolaryngology: [phone]                    Or dial [phone] and ask for the resident on call for:  ENT Otolaryngology  For emergency care, call the:  [place] [facility]:  [phone] (TTY for hearing impaired: [phone])

## 2022-10-18 NOTE — BRIEF OP NOTE
[facility] Brief Operative Note    Pre-operative diagnosis: Hyperparathyroidism (H) [E21.3]   Post-operative diagnosis Same   Procedure: Procedure(s):  NECK EXPLORATION WITH RIGHT PARATHYROID ADENOMA   Surgeon(s): Surgeon(s) and Role:     * Judit Villagomez MD - Primary     * Angeles Kahn MD - Resident - Assisting   Estimated blood loss: 5 ml    Specimens: ID Type Source Tests Collected by Time Destination   1 : Right inferior parathyroid gland Tissue Parathyroid SURGICAL PATHOLOGY EXAM Judit Villagomez MD 10/18/2022  1:44 PM    A :  Blood Foot, Right PARATHYROID HORMONE INTACT Whitley Bagley APRN CRNA 10/18/2022  1:36 PM    B : Post PTH Blood Foot, Right PARATHYROID HORMONE INTACT Whitley Bagley APRN CRNA 10/18/2022  1:59 PM       Findings: Right inferior parathyroid identified consistent with sestamibi scan preop, removed with frozen path confirming hypercellular adenoma. Right superior parathyroid identified and grossly normal. Right RLN unable to be identified but R vagus nerve confirmed intact with probe. Left inferior parathyroid identified and grossly normal. Left RLN not identified.   Complications:        None      - - - - - - - - - - - - - - - - - -  Angeles Kahn MD  General Surgery PGY-5  See [facility] for on call information.

## 2022-10-20 LAB
PATH REPORT.COMMENTS IMP SPEC: NORMAL
PATH REPORT.COMMENTS IMP SPEC: NORMAL
PATH REPORT.FINAL DX SPEC: NORMAL
PATH REPORT.GROSS SPEC: NORMAL
PATH REPORT.INTRAOP OBS SPEC DOC: NORMAL
PATH REPORT.MICROSCOPIC SPEC OTHER STN: NORMAL
PATH REPORT.RELEVANT HX SPEC: NORMAL
PHOTO IMAGE: NORMAL

## 2022-10-25 NOTE — OP NOTE
Procedure Date: 10/18/2022    PREOPERATIVE DIAGNOSIS:  Primary hyperparathyroidism.    POSTOPERATIVE DIAGNOSIS:  Primary hyperparathyroidism.    SURGICAL PROCEDURE:  Neck exploration, resection of the right parathyroid adenoma.    FINDINGS:  Significant right inferior parathyroid adenoma.    SURGEON:  Judit Villagomez MD    ASSISTANT:  Angeles Kahn MD    ANESTHESIA:  General endotracheal with nerve monitoring and endotracheal tube.    COMPLICATIONS:  None.    ESTIMATED BLOOD LOSS:  Less than 5 mL.    Pre-incision parathyroid hormone level 229; 20-minute post-excision parathyroid hormone level 18.      CLINICAL INDICATIONS FOR THE PROCEDURE:  This is a 52-year-old gentleman noted to be hypercalcemic.  A workup ensued that confirmed primary hyperparathyroidism.  A localizing study included a nuclear medicine scan that identified a right sided parathyroid adenoma.  Based on this, it was recommended the patient undergo a neck exploration, resection of parathyroid adenoma or adenomas.  Surgical procedure was discussed with the patient including but not limited to the risks of bleeding, infection, injury to the recurrent laryngeal nerve or nerves, potential permanent hypocalcemia, loss of airway.  The patient is aware of this, agreed to proceed with surgery.  Consent was obtained.  Site was marked.    DESCRIPTION OF PROCEDURE:  The patient was brought to the operating room in stable condition, placed on the operating table in supine position.  After appropriate general anesthesia was obtained, the patient was prepped and draped in sterile fashion.  Timeout was then performed.  A 4 cm incision was made over the anterior neck following a natural skin crease.  The platysma was then divided and subplatysmal planes were then created.  Strap muscles were divided in the midline and retracted laterally.  Based on the localizing study, we evaluated the right neck first.  The right lobe of the thyroid gland was retracted medially.   Almost as we did so in the inferior position, but in the tracheoesophageal groove on the right in the inferior aspect of the right lobe of the thyroid gland was an enlarged parathyroid gland.  Prior to resecting this, we evaluated the recurrent laryngeal nerve.  We evaluated the right superior parathyroid gland.  This was of normal size and normal position.  We then dissected this very large right inferior parathyroid gland.  The blood supply was then clipped and then divided using bipolar cautery and sent for frozen section, confirmed to be hypercellular parathyroid gland.  We then evaluated the left neck and identified the left superior and left inferior parathyroid glands.  These were of normal size and normal position.  The left recurrent laryngeal nerve was also identified and noted to be intact.  We confirmed both right and left recurrent laryngeal nerves were intact visibly as well as with nerve stimulation.  Fibrillar was then placed in the operative bed.  The strap muscles were then approximated at the midline using a 3-0 chromic interrupted suture.  The platysma was approximated using 3-0 chromic interrupted suture.  The skin incision was approximated using a 5-0 Monocryl running subcuticular suture.  The wound was dressed with Dermabond.  The patient was then extubated and returned to the recovery room in stable condition.  I was present for the entire surgical procedure.    Judit Villagomez MD        D: 10/25/2022   T: 10/25/2022   MT: DAYAMI    Name:     LIZZETTE HERRON  MRN:      [id number]        Account:        [id number]   :      1970           Procedure Date: 10/18/2022     Document: [id number]

## 2022-12-01 NOTE — TELEPHONE ENCOUNTER
Pt LVM returning our call to r/s appt to 9:15am. Please call back and discuss. Thank you!    Tom Flowers  In Clinic Visit Facilitator

## 2022-12-01 NOTE — TELEPHONE ENCOUNTER
RN called pt back and pt voiced understanding that appt today had to be rescheduled. RN asked pt how his recovery from surgery has been going and pt states that he has been having a lot of cramping in his legs,digestive issues and numbness/ Tingling in his hands and feet. RN reviewed with pt the Tums protocol and pt reports that he has been taking Tums. No other concerns per pt. Pt notified that  RN will update him once  advises on follow up plan. Pt agrees with this plan..Cielo Leo RN

## 2022-12-13 NOTE — TELEPHONE ENCOUNTER
RN sent a message to the [facility] Team to see if pt can be added for a virtual visit sooner than 1/12..Judit Angulo RN, MD  You 9 minutes ago (4:33 PM)     ME  We can schedule for virtual visit     Thania

## 2022-12-14 NOTE — TELEPHONE ENCOUNTER
Received message from [facility] team that there are no spots open to schedule a virtual at the [facility] clinic. Routing to procedure schedulers to please assist with scheduling a post op virtual with Dr Villagomez on 1/12/23. Ok to double book in the following times : 10 or 11am It pt would like to speak with a nurse please route message to  Surgery pool p [phone]  Thank you..Cielo Leo RN

## 2022-12-14 NOTE — TELEPHONE ENCOUNTER
Hello,     Called patient and he scheduled for an in-person visit.     Lesa raymond Procedure   Dermotology, Surgery, Urology  [facility] [place]

## 2023-01-11 ENCOUNTER — OFFICE VISIT (OUTPATIENT)
Dept: ENDOCRINOLOGY | Facility: CLINIC | Age: 53
End: 2023-01-11
Payer: COMMERCIAL

## 2023-01-11 ENCOUNTER — LAB (OUTPATIENT)
Dept: LAB | Facility: CLINIC | Age: 53
End: 2023-01-11
Payer: COMMERCIAL

## 2023-01-11 ENCOUNTER — TELEPHONE (OUTPATIENT)
Dept: SURGERY | Facility: CLINIC | Age: 53
End: 2023-01-11

## 2023-01-11 VITALS
DIASTOLIC BLOOD PRESSURE: 89 MMHG | HEART RATE: 90 BPM | SYSTOLIC BLOOD PRESSURE: 127 MMHG | WEIGHT: 221 LBS | BODY MASS INDEX: 28.37 KG/M2

## 2023-01-11 DIAGNOSIS — E21.0 PRIMARY HYPERPARATHYROIDISM (H): Primary | ICD-10-CM

## 2023-01-11 DIAGNOSIS — E21.0 PRIMARY HYPERPARATHYROIDISM (H): ICD-10-CM

## 2023-01-11 LAB
ALBUMIN SERPL BCG-MCNC: 4.6 G/DL (ref 3.5–5.2)
ALP SERPL-CCNC: 57 U/L (ref 40–129)
ALT SERPL W P-5'-P-CCNC: 19 U/L (ref 10–50)
ANION GAP SERPL CALCULATED.3IONS-SCNC: 10 MMOL/L (ref 7–15)
AST SERPL W P-5'-P-CCNC: 19 U/L (ref 10–50)
BILIRUB SERPL-MCNC: 0.3 MG/DL
BUN SERPL-MCNC: 16.8 MG/DL (ref 6–20)
CALCIUM SERPL-MCNC: 9.2 MG/DL (ref 8.6–10)
CHLORIDE SERPL-SCNC: 108 MMOL/L (ref 98–107)
CREAT SERPL-MCNC: 1.09 MG/DL (ref 0.67–1.17)
DEPRECATED HCO3 PLAS-SCNC: 26 MMOL/L (ref 22–29)
GFR SERPL CREATININE-BSD FRML MDRD: 81 ML/MIN/1.73M2
GLUCOSE SERPL-MCNC: 99 MG/DL (ref 70–99)
POTASSIUM SERPL-SCNC: 4.3 MMOL/L (ref 3.4–5.3)
PROT SERPL-MCNC: 6.9 G/DL (ref 6.4–8.3)
PTH-INTACT SERPL-MCNC: 103 PG/ML (ref 15–65)
SODIUM SERPL-SCNC: 144 MMOL/L (ref 136–145)

## 2023-01-11 PROCEDURE — 82306 VITAMIN D 25 HYDROXY: CPT | Performed by: INTERNAL MEDICINE

## 2023-01-11 PROCEDURE — 99214 OFFICE O/P EST MOD 30 MIN: CPT | Mod: GC | Performed by: INTERNAL MEDICINE

## 2023-01-11 PROCEDURE — 36415 COLL VENOUS BLD VENIPUNCTURE: CPT | Performed by: PATHOLOGY

## 2023-01-11 PROCEDURE — 83970 ASSAY OF PARATHORMONE: CPT | Performed by: PATHOLOGY

## 2023-01-11 PROCEDURE — 80053 COMPREHEN METABOLIC PANEL: CPT | Performed by: PATHOLOGY

## 2023-01-11 RX ORDER — IBUPROFEN 200 MG
CAPSULE ORAL DAILY
COMMUNITY
End: 2023-06-06

## 2023-01-11 ASSESSMENT — PAIN SCALES - GENERAL: PAINLEVEL: NO PAIN (0)

## 2023-01-11 NOTE — TELEPHONE ENCOUNTER
Message  Received: 2 days ago  Lesa Malcolm Colleen E, RN  Good afternoon,     Patient said in-person visit is okay. Will leave the appointment as is.     Thank you     Lesa raymond Procedure    Dermatology, Surgery, Urology   [facility]               Previous Messages     ----- Message -----   From: Cielo Leo, RN   Sent: 1/9/2023  12:11 PM CST   To: [facility] Procedure Coordinator, *     Can someone please reach out to pt to offer a video visit versus in clinic on 1/19 if its easier for pt? Currently scheduled for in clinic     Thank you   Cielo   ----- Message -----   From: Judit Villagomez MD   Sent: 1/9/2023  11:49 AM CST   To: Cielo Leo, RN     I am scheduled to see this patient Jan 19th--this can be video visit if its easier for him     Thania

## 2023-01-11 NOTE — LETTER
1/11/2023       RE: Franklin Mejia  [address]  [place] [address]     Dear Colleague,    Thank you for referring your patient, Franklin Mejia, to the [facility] [place] [facility]. Please see a copy of my visit note below.    ENDOCRINOLOGY CLINIC VISIT    Reason for visit:  F/u primary hyperparathyroid s/p right parathyroid adenoma removal 10/2022    SUBJECTIVE      7/2022 referred to endo for chronic mild hypercalcemia for 10 years and elevated PTH. History of nephrolithiasis x1.     Had parathyroid scan which revealed 1.1 cm lesion on right inferior neck suspicious for primary hyperparathyroid. He was evaluated by surgery and underwent excision of right parathyroid adenoma on 10/18/22, pathology revealed hypercellular parathyroid tissue.     On day of surgery: PTH: pre-incision 229 > 51 > 18 after 20 minutes adenoma excision     INTERVAL HISTORY    Surgery went well. The only difference he noted is it is easier for him to get up in the morning, feels more energy/more well rested.    Was taking the TUMS after the surgery but then stopped after ~ a month or so. Had noted some intermittent calf cramping with ambulation and thought it could've been due to calcium so started taking daily vitamin with calcium and have gone away for past two weeks.     Also with mild paresthesias in bilateral hands/feet intermittently. This is new.     ROS:  Full review of systems taken with the help of the intake sheet. Otherwise a complete 14 point review of systems was taken and is negative unless stated in the history above.    Past Medical/Surgical History:  Past Medical History:   Diagnosis Date     Nephrolithiasis 10/2020     Past Surgical History:   Procedure Laterality Date     APPENDECTOMY  1983     PARATHYROIDECTOMY N/A 10/18/2022    Procedure: NECK EXPLORATION WITH RIGHT PARATHYROID ADENOMA;  Surgeon: Judit Villagomez,  MD;  Location: [facility] OR       Allergies:  Allergies   Allergen Reactions     Pcn [Penicillins]        Current Medications   Current Outpatient Medications   Medication     acetaminophen (TYLENOL) 325 MG tablet     oxyCODONE (ROXICODONE) 5 MG tablet     polyethylene glycol (MIRALAX) 17 GM/Dose powder     vitamin D3 (CHOLECALCIFEROL) 50 mcg (2000 units) tablet     No current facility-administered medications for this visit.       Family History:  Family History   Problem Relation Age of Onset     Lipids Mother      Hypertension Father          copd 81     Macular Degeneration Father      Diabetes Father      Chronic Obstructive Pulmonary Disease Father      Cerebrovascular Disease Paternal Half-Brother      Diabetes Paternal Half-Brother      Thyroid Disease No family hx of      Glaucoma No family hx of      Cancer No family hx of        Social History:  Social History     Tobacco Use     Smoking status: Former     Smokeless tobacco: Former     Types: Chew     Quit date: 2018   Substance Use Topics     Alcohol use: Yes     Alcohol/week: 0.0 standard drinks     Comment: occasional beer - 0-3 peer week        Physical Exam   Vitals: There were no vitals taken for this visit.  BMI= There is no height or weight on file to calculate BMI.   General: well appearing, no acute distress, pleasant and conversant,   Mental Status/neuro: alert and oriented  Neck: well healed surgical scar, no TTP  Eyes: anicteric, PERRL, no proptosis or lid lag  Heart: regular rhythm  Lung: non-labored conversational breathing  Legs: no swelling or edema    Labs : I reviewed data from epic and extract and summarize the pertinent data here.     Lab Results   Component Value Date/Time    PTHI 18 10/18/2022 02:42 PM    PTHI 51 10/18/2022 01:59 PM    PTHI 229 (H) 10/18/2022 01:36 PM    PTHI 154 (H) 2022 03:29 PM    PTHI 167 (H) 2022 05:31 PM     Lab Results   Component Value Date/Time    JUAN 10.9 (H) 2022 03:29 PM    JUAN 11.0  (H) 03/16/2022 05:31 PM    JUAN 10.9 (H) 01/07/2022 02:04 PM    JUAN 10.8 (H) 10/15/2020 09:44 AM    JUAN 10.5 (H) 06/04/2019 01:22 PM    JUAN 10.9 (H) 03/22/2019 10:03 AM     Assessment and Plan  Franklin Mejia is a 52 year old male with history of primary R parathyroid adenoma s/p resection 10/2022.     History of primary hyperparathyroidism s/p R parathyroid adenoma resection   Reporting symptoms c/w hypocalcemia. Will evaluate  - recheck Calcium, albumin, PTH and vitamin D    Vitamin D deficiency  Last at 31  - continue Vit D repletion 2000 international unit(s)/day    No endocrinology follow-up needed.     20 minutes spent on the date of the encounter doing chart review, history and exam, documentation and further activities as noted above.    This patient was seen and discussed with the attending, Dr. Irizarry.    Marcella Olson MD  Internal Medicine Resident    Attending tie-in note  I saw the patient with resident Dr. Naranjo and directly examined patient and discussed. Agree above note and plan.       35 minutes spent on the date of the encounter doing chart review, history and exam, documentation and further activities as noted above.    Deja Irizarry MD  Staff Physician  Endocrinology and Metabolism  [facility]  License: [id number]  Pager: [phone]

## 2023-01-11 NOTE — NURSING NOTE
"Chief Complaint   Patient presents with     Thyroid Problem     Vital signs:      BP: 127/89 Pulse: 90             Weight: 100.2 kg (221 lb)  Estimated body mass index is 28.37 kg/m  as calculated from the following:    Height as of 10/18/22: 1.88 m (6' 2\").    Weight as of this encounter: 100.2 kg (221 lb).          "

## 2023-01-11 NOTE — PROGRESS NOTES
ENDOCRINOLOGY CLINIC VISIT    Reason for visit:  F/u primary hyperparathyroid s/p right parathyroid adenoma removal 10/2022    SUBJECTIVE      7/2022 referred to endo for chronic mild hypercalcemia for 10 years and elevated PTH. History of nephrolithiasis x1.     Had parathyroid scan which revealed 1.1 cm lesion on right inferior neck suspicious for primary hyperparathyroid. He was evaluated by surgery and underwent excision of right parathyroid adenoma on 10/18/22, pathology revealed hypercellular parathyroid tissue.     On day of surgery: PTH: pre-incision 229 > 51 > 18 after 20 minutes adenoma excision     INTERVAL HISTORY    Surgery went well. The only difference he noted is it is easier for him to get up in the morning, feels more energy/more well rested.    Was taking the TUMS after the surgery but then stopped after ~ a month or so. Had noted some intermittent calf cramping with ambulation and thought it could've been due to calcium so started taking daily vitamin with calcium and have gone away for past two weeks.     Also with mild paresthesias in bilateral hands/feet intermittently. This is new.     ROS:  Full review of systems taken with the help of the intake sheet. Otherwise a complete 14 point review of systems was taken and is negative unless stated in the history above.    Past Medical/Surgical History:  Past Medical History:   Diagnosis Date     Nephrolithiasis 10/2020     Past Surgical History:   Procedure Laterality Date     APPENDECTOMY  1983     PARATHYROIDECTOMY N/A 10/18/2022    Procedure: NECK EXPLORATION WITH RIGHT PARATHYROID ADENOMA;  Surgeon: Judit Villagomez MD;  Location: [facility] OR       Allergies:  Allergies   Allergen Reactions     Pcn [Penicillins]        Current Medications   Current Outpatient Medications   Medication     acetaminophen (TYLENOL) 325 MG tablet     oxyCODONE (ROXICODONE) 5 MG tablet     polyethylene glycol (MIRALAX) 17 GM/Dose powder     vitamin D3  (CHOLECALCIFEROL) 50 mcg (2000 units) tablet     No current facility-administered medications for this visit.       Family History:  Family History   Problem Relation Age of Onset     Lipids Mother      Hypertension Father          copd 81     Macular Degeneration Father      Diabetes Father      Chronic Obstructive Pulmonary Disease Father      Cerebrovascular Disease Paternal Half-Brother      Diabetes Paternal Half-Brother      Thyroid Disease No family hx of      Glaucoma No family hx of      Cancer No family hx of        Social History:  Social History     Tobacco Use     Smoking status: Former     Smokeless tobacco: Former     Types: Chew     Quit date: 2018   Substance Use Topics     Alcohol use: Yes     Alcohol/week: 0.0 standard drinks     Comment: occasional beer - 0-3 peer week        Physical Exam   Vitals: There were no vitals taken for this visit.  BMI= There is no height or weight on file to calculate BMI.   General: well appearing, no acute distress, pleasant and conversant,   Mental Status/neuro: alert and oriented  Neck: well healed surgical scar, no TTP  Eyes: anicteric, PERRL, no proptosis or lid lag  Heart: regular rhythm  Lung: non-labored conversational breathing  Legs: no swelling or edema    Labs : I reviewed data from epic and extract and summarize the pertinent data here.     Lab Results   Component Value Date/Time    PTHI 18 10/18/2022 02:42 PM    PTHI 51 10/18/2022 01:59 PM    PTHI 229 (H) 10/18/2022 01:36 PM    PTHI 154 (H) 2022 03:29 PM    PTHI 167 (H) 2022 05:31 PM     Lab Results   Component Value Date/Time    JUAN 10.9 (H) 2022 03:29 PM    JUAN 11.0 (H) 2022 05:31 PM    JUAN 10.9 (H) 2022 02:04 PM    JUAN 10.8 (H) 10/15/2020 09:44 AM    JUAN 10.5 (H) 2019 01:22 PM    JUAN 10.9 (H) 2019 10:03 AM     Assessment and Plan  Franklin Mejia is a 52 year old male with history of primary R parathyroid adenoma s/p resection 10/2022.      History of primary hyperparathyroidism s/p R parathyroid adenoma resection   Reporting symptoms c/w hypocalcemia. Will evaluate  - recheck Calcium, albumin, PTH and vitamin D    Vitamin D deficiency  Last at 31  - continue Vit D repletion 2000 international unit(s)/day    No endocrinology follow-up needed.     20 minutes spent on the date of the encounter doing chart review, history and exam, documentation and further activities as noted above.    This patient was seen and discussed with the attending, Dr. Irizarry.    Marcella Olson MD  Internal Medicine Resident    Attending tie-in note  I saw the patient with resident Dr. Naranjo and directly examined patient and discussed. Agree above note and plan.       35 minutes spent on the date of the encounter doing chart review, history and exam, documentation and further activities as noted above.    Deja Irizarry MD  Staff Physician  Endocrinology and Metabolism  [facility]  License: [id number]  Pager: [phone]

## 2023-01-12 LAB — DEPRECATED CALCIDIOL+CALCIFEROL SERPL-MC: 26 UG/L (ref 20–75)

## 2023-01-19 ENCOUNTER — OFFICE VISIT (OUTPATIENT)
Dept: SURGERY | Facility: CLINIC | Age: 53
End: 2023-01-19
Payer: COMMERCIAL

## 2023-01-19 VITALS — BODY MASS INDEX: 27.21 KG/M2 | HEIGHT: 74 IN | WEIGHT: 212 LBS

## 2023-01-19 DIAGNOSIS — E21.3 HYPERPARATHYROIDISM (H): Primary | ICD-10-CM

## 2023-01-19 DIAGNOSIS — E21.0 PRIMARY HYPERPARATHYROIDISM (H): Primary | ICD-10-CM

## 2023-01-19 PROCEDURE — 99024 POSTOP FOLLOW-UP VISIT: CPT | Performed by: SURGERY

## 2023-01-19 ASSESSMENT — PAIN SCALES - GENERAL: PAINLEVEL: NO PAIN (0)

## 2023-01-19 NOTE — RESULT ENCOUNTER NOTE
Dear Franklin     Here is your results. Parathyroid hormone slight high but this could be post operative reaction, not much concern at this point. We can repeat lab in several months.     Please call nursing line, if you are [facility] patient at [phone], if you are [facility] patient at [phone],  if you have any questions.    Please take care  Deja Irizarry MD

## 2023-01-19 NOTE — NURSING NOTE
"Franklin Mejia's chief complaint for this visit includes:  Chief Complaint   Patient presents with     Post-Op - General Surgery     Right parathyroid adenoma follow up     PCP: Branden Esquivel    Referring Provider:  No referring provider defined for this encounter.    Ht 1.88 m (6' 2\")   Wt 96.2 kg (212 lb)   BMI 27.22 kg/m    No Pain (0)        Allergies   Allergen Reactions     Pcn [Penicillins]          Do you need any medication refills at today's visit? no    Pebbles Martinez RN           "

## 2023-01-19 NOTE — LETTER
1/19/2023         RE: Franklin Mejia  [address]  [place] [address]        Dear Colleague,    Thank you for referring your patient, Franklin Mejia, to the [facility]. Please see a copy of my visit note below.    Follow-up visit status post resection of parathyroid adenoma.    Mr. Mejia underwent resection of a large right inferior parathyroid adenoma in October 2022.  He had appropriate drop of his parathyroid hormone level from 229 down to 18 intraoperatively.  His most recent calcium is 9.2.    Overall the patient feels much better.  He has no problems of voice quality is worse with swallowing.  No symptoms of hypocalcemia.    As I would expect the wound is well-healed.  Plan the patient will follow up with me on an as-needed basis.  Continue having annual calcium checks as he is in the patient undergoing resection of parathyroid adenoma is that of the risk albeit very minimal of developing a second adenoma.    Judit Villagomez MD        Again, thank you for allowing me to participate in the care of your patient.        Sincerely,        Judit Villagomez MD

## 2023-01-23 ENCOUNTER — MYC MEDICAL ADVICE (OUTPATIENT)
Dept: FAMILY MEDICINE | Facility: CLINIC | Age: 53
End: 2023-01-23
Payer: COMMERCIAL

## 2023-01-23 DIAGNOSIS — K21.9 GASTROESOPHAGEAL REFLUX DISEASE, UNSPECIFIED WHETHER ESOPHAGITIS PRESENT: Primary | ICD-10-CM

## 2023-01-31 ENCOUNTER — TELEPHONE (OUTPATIENT)
Dept: ENDOCRINOLOGY | Facility: CLINIC | Age: 53
End: 2023-01-31

## 2023-01-31 NOTE — TELEPHONE ENCOUNTER
"----- Message from Jericho Greene sent at 1/30/2023  7:55 AM CST -----    ----- Message -----  From: Deja Irizarry MD  Sent: 1/29/2023   4:45 PM CST  To: Jericho Greene    Ok!  ----- Message -----  From: Jericho Greene  Sent: 1/27/2023   1:20 PM CST  To: Deja Irizarry MD    Hello Dr. Irizarry,    In your check out orders for this patient on 1/11/23 you asked that he follow up with you in 6 months.Unfortunately there are no regular \"return endocrine\" slots available in the recommended timeframe.Would it be appropriate to add this patient in a JOE slot or to schedule in a \"return pituitary slot?\" Please advise on scheduling when able.    Thank you,  Jericho          "

## 2023-01-31 NOTE — TELEPHONE ENCOUNTER
ALEX and sent Just Eathart 1/31/23 for pt t c/b to schedule 6 month follow up appointment with Dr. Irizarry. Pt can be scheduled for an in person appointment with Dr. Irizarry on 7/12/23 at 9:00 or a virtual appointment 7/19/23 at 8:30, 10:00, 10:30, or 11:00. Please note that these appointments will need to be manually scheduled as they are marked for pituitary return patients and will not appear using auto search.

## 2023-02-02 NOTE — TELEPHONE ENCOUNTER
Final attempt: LVM 2/2/23 for pt t c/b to schedule 6 month follow up appointment with Dr. Irizarry. Pt can be scheduled for an in person appointment with Dr. Irizarry on 7/12/23 at 9:00 or a virtual appointment 7/19/23 at 8:30, 10:00, 10:30, or 11:00. Please note that these appointments will need to be manually scheduled as they are marked for pituitary return patients and will not appear using auto search. MAXIMUM NUMBER OF ATTEMPTS REACHED.

## 2023-02-08 NOTE — PROGRESS NOTES
Follow-up visit status post resection of parathyroid adenoma.    Mr. Mejia underwent resection of a large right inferior parathyroid adenoma in October 2022.  He had appropriate drop of his parathyroid hormone level from 229 down to 18 intraoperatively.  His most recent calcium is 9.2.    Overall the patient feels much better.  He has no problems of voice quality is worse with swallowing.  No symptoms of hypocalcemia.    As I would expect the wound is well-healed.  Plan the patient will follow up with me on an as-needed basis.  Continue having annual calcium checks as he is in the patient undergoing resection of parathyroid adenoma is that of the risk albeit very minimal of developing a second adenoma.    Judit Villagomez MD

## 2023-03-02 ENCOUNTER — OFFICE VISIT (OUTPATIENT)
Dept: FAMILY MEDICINE | Facility: CLINIC | Age: 53
End: 2023-03-02
Payer: COMMERCIAL

## 2023-03-02 VITALS
OXYGEN SATURATION: 99 % | BODY MASS INDEX: 28.23 KG/M2 | WEIGHT: 220 LBS | DIASTOLIC BLOOD PRESSURE: 83 MMHG | SYSTOLIC BLOOD PRESSURE: 134 MMHG | HEART RATE: 86 BPM | TEMPERATURE: 98.7 F | HEIGHT: 74 IN

## 2023-03-02 DIAGNOSIS — E21.3 HYPERPARATHYROIDISM (H): ICD-10-CM

## 2023-03-02 DIAGNOSIS — R79.89 ELEVATED PARATHYROID HORMONE: ICD-10-CM

## 2023-03-02 DIAGNOSIS — R20.2 PARESTHESIAS: ICD-10-CM

## 2023-03-02 DIAGNOSIS — M26.609 TMJ (TEMPOROMANDIBULAR JOINT SYNDROME): ICD-10-CM

## 2023-03-02 DIAGNOSIS — R42 VERTIGO: ICD-10-CM

## 2023-03-02 DIAGNOSIS — H93.13 TINNITUS, BILATERAL: Primary | ICD-10-CM

## 2023-03-02 PROCEDURE — 99214 OFFICE O/P EST MOD 30 MIN: CPT | Performed by: FAMILY MEDICINE

## 2023-03-02 ASSESSMENT — PAIN SCALES - GENERAL: PAINLEVEL: NO PAIN (0)

## 2023-03-02 NOTE — PROGRESS NOTES
"  Assessment & Plan       ICD-10-CM    1. Tinnitus, bilateral  H93.13 Adult ENT  Referral      2. Vertigo  R42 Adult ENT  Referral      3. TMJ (temporomandibular joint syndrome)  M26.609       4. Paresthesias  R20.2       5. Hyperparathyroidism (H)  E21.3       6. Elevated parathyroid hormone  E34.9         He now has a constellation of symptoms that include tinnitus, vertigo, and some decreased hearing, so I discussed the possibility of Ménière's disease  I think he would benefit from an ENT consultation and a more formal assessment of his hearing, so I made a referral for that  He can continue conservative care for his TMJ symptoms  He will have repeat calcium and PTH levels checked at some point in the upcoming months (per his endocrinologist), but he also has a physical scheduled with me on June 6, so I can follow-up on the above items with him at that time (or sooner prn)    BMI:   Estimated body mass index is 28.42 kg/m  as calculated from the following:    Height as of this encounter: 1.874 m (6' 1.78\").    Weight as of this encounter: 99.8 kg (220 lb).   Weight management plan: Discussed healthy diet and exercise guidelines      Return in about 3 months (around 6/6/2023) for Physical Exam, Routine Visit, Lab Work.    Branden Esquivel MD  [facility] JAS Dozier is a 52 year old, presenting for the following health issues:  Vertigo      History of Present Illness       Reason for visit:  Vertigo jaw pain ringing in the ears        Dizziness  Onset/Duration: off and on since  Description:   Do you feel faint: No  Does it feel like the surroundings (bed, room) are moving: YES  Unsteady/off balance: YES  Have you passed out or fallen: No  Intensity: mild  Progression of Symptoms: intermittent, for the last week it has been gone  Accompanying Signs & Symptoms:  Heart palpitations or chest pain: No  Nausea, vomiting: No  Weakness or lack of coordination in arms or " legs: No  Vision or speech changes: he has floaters and when it happens his vision gets blurry  Numbness or tingling: YES- in his hands but not when he is dizzy  Ringing in ears (Tinnitus): YES  Hearing Loss: No  History:   Head trauma/concussion history: No  Previous similar symptoms: No  Recent bleeding history: No  Any new medications (BP?): No  Precipitating factors:   Worse with activity: YES  Worse with head movement: YES  Alleviating factors:   Does staying in a fixed position give relief: YES  Therapies tried and outcome: None    He has had a ringing in his ears for a number of years, but it seems like its been getting worse.  His hearing seems to be decreased lately as well.  He does work around heavy machinery and he generally does not wear earplugs because it seems to make his tinnitus worse.  He has had TMJ symptoms for many years as well.  He has been having some vertigo lately.  Sometimes with certain head movements.  He has also been dealing with hypercalcemia and hyperparathyroidism.  He had a parathyroid adenoma removed and his PTH dropped markedly, although it bounced back up again in mid January when it was last checked.  His calcium has come down into the normal range, but he is still taking a calcium supplement.  He has had some numbness and tingling of his fingers and lately of his right great toe.  His left foot seems fine.    Patient Active Problem List   Diagnosis     Gastroesophageal reflux disease, esophagitis presence not specified     Overweight (BMI 25.0-29.9)     Multiple benign nevi     Vitamin D deficiency     Nephrolithiasis     TMJ (temporomandibular joint syndrome)     Hyperparathyroidism (H)     Right elbow pain     Current Outpatient Medications   Medication     calcium carbonate 500 mg (elemental) (OSCAL 500) 1250 (500 Ca) MG TABS tablet     omeprazole (PRILOSEC) 20 MG DR capsule     vitamin D3 (CHOLECALCIFEROL) 50 mcg (2000 units) tablet     acetaminophen (TYLENOL) 325 MG  "tablet     polyethylene glycol (MIRALAX) 17 GM/Dose powder     No current facility-administered medications for this visit.         Review of Systems   Noncontributory except as above.      Objective    /83 (BP Location: Left arm, Patient Position: Sitting, Cuff Size: Adult Large)   Pulse 86   Temp 98.7  F (37.1  C) (Oral)   Ht 1.874 m (6' 1.78\")   Wt 99.8 kg (220 lb)   SpO2 99%   BMI 28.42 kg/m    Body mass index is 28.42 kg/m .  Physical Exam   GENERAL: healthy, alert and no distress  EYES: Eyes grossly normal to inspection, PERRL and conjunctivae and sclerae normal  HENT: normal cephalic/atraumatic, ear canals and TM's normal and some crepitus and mild discomfort to palpation over the TMJs  NECK: no adenopathy, no asymmetry, masses, or scars and thyroid normal to palpation  RESP: lungs clear to auscultation - no rales, rhonchi or wheezes  CV: regular rate and rhythm, normal S1 S2, no S3 or S4, no murmur, click or rub, no peripheral edema and peripheral pulses strong  MS: no gross musculoskeletal defects noted, no edema  NEURO: Normal strength and tone, mentation intact and speech normal    Past lab values and office notes were reviewed related to his hypercalcemia and hyperparathyroidism            "

## 2023-04-04 ENCOUNTER — OFFICE VISIT (OUTPATIENT)
Dept: FAMILY MEDICINE | Facility: CLINIC | Age: 53
End: 2023-04-04
Payer: COMMERCIAL

## 2023-04-04 VITALS
TEMPERATURE: 98.3 F | HEART RATE: 78 BPM | WEIGHT: 215 LBS | OXYGEN SATURATION: 98 % | HEIGHT: 74 IN | SYSTOLIC BLOOD PRESSURE: 138 MMHG | BODY MASS INDEX: 27.59 KG/M2 | DIASTOLIC BLOOD PRESSURE: 89 MMHG

## 2023-04-04 DIAGNOSIS — N20.0 NEPHROLITHIASIS: ICD-10-CM

## 2023-04-04 DIAGNOSIS — M54.9 MID BACK PAIN ON RIGHT SIDE: Primary | ICD-10-CM

## 2023-04-04 DIAGNOSIS — K80.20 CALCULUS OF GALLBLADDER WITHOUT CHOLECYSTITIS WITHOUT OBSTRUCTION: ICD-10-CM

## 2023-04-04 PROCEDURE — 99213 OFFICE O/P EST LOW 20 MIN: CPT | Performed by: FAMILY MEDICINE

## 2023-04-04 ASSESSMENT — PAIN SCALES - GENERAL: PAINLEVEL: MODERATE PAIN (4)

## 2023-04-04 ASSESSMENT — ENCOUNTER SYMPTOMS: BACK PAIN: 1

## 2023-04-04 NOTE — PROGRESS NOTES
Assessment & Plan       ICD-10-CM    1. Mid back pain on right side  M54.9       2. Nephrolithiasis  N20.0       3. Calculus of gallbladder without cholecystitis without obstruction  K80.20         I suspect this discomfort is muscular, although there may be an element of referred pain from cholelithiasis if/when he gets symptoms after eating    I suggested applying heat to the area and doing some gentle stretching exercises  If symptoms persist and/or worsen, then we could do a dedicated CT scan in that area to rule out any mass    Plan a recheck in 2 months with an annual physical, or sooner prn    Branden Esquivel MD  [facility] JAS Dozier is a 52 year old, presenting for the following health issues:  Back Pain        4/4/2023     9:23 AM   Additional Questions   Roomed by CAM   Accompanied by none         4/4/2023     9:23 AM   Patient Reported Additional Medications   Patient reports taking the following new medications none     Back Pain     History of Present Illness       Reason for visit:  Constant pain in mid right back  Symptom onset:  More than a month    He eats 0-1 servings of fruits and vegetables daily.He consumes 0 sweetened beverage(s) daily.        Patient is here today with the concern for back pain, it's been more then 6 weeks, right side mid back, feels like a ball.    He feels some discomfort in the right mid back at times.  It comes and goes.  He can be physically active playing basketball or golf or other activities and it does not bother him, but sometimes he will just be standing around and he will feel a sharp discomfort there.  Sometimes it occurs after eating.  He does have known nephrolithiasis and cholelithiasis from previous CT scans in the last few years.  He does not feel or see any particular lump there, but he is concerned about a tumor.  There are a lot of people that live around him that have cancer and they wonder if there is something in the  "well water that is causing that.    Patient Active Problem List   Diagnosis     Gastroesophageal reflux disease, esophagitis presence not specified     Overweight (BMI 25.0-29.9)     Multiple benign nevi     Vitamin D deficiency     Nephrolithiasis     TMJ (temporomandibular joint syndrome)     Hyperparathyroidism (H)     Right elbow pain     Calculus of gallbladder without cholecystitis without obstruction     Current Outpatient Medications   Medication     acetaminophen (TYLENOL) 325 MG tablet     calcium carbonate 500 mg (elemental) (OSCAL 500) 1250 (500 Ca) MG TABS tablet     omeprazole (PRILOSEC) 20 MG DR capsule     polyethylene glycol (MIRALAX) 17 GM/Dose powder     vitamin D3 (CHOLECALCIFEROL) 50 mcg (2000 units) tablet     No current facility-administered medications for this visit.           Review of Systems   Musculoskeletal: Positive for back pain.            Objective    /89 (BP Location: Left arm, Patient Position: Sitting, Cuff Size: Adult Regular)   Pulse 78   Temp 98.3  F (36.8  C) (Oral)   Ht 1.886 m (6' 2.25\")   Wt 97.5 kg (215 lb)   SpO2 98%   BMI 27.42 kg/m    Body mass index is 27.42 kg/m .  Physical Exam   GENERAL: healthy, alert and no distress  ABDOMEN: soft, nontender, no hepatosplenomegaly, no masses and bowel sounds normal  MS: no gross musculoskeletal defects noted, no edema  BACK: no CVA tenderness, no paralumbar tenderness.  He points to the right mid thoracic area as to where he feels the tight sensation, but I do not feel or see any mass in that area or asymmetry compared to the left side.    Previous CT scan results were reviewed from 2020 and 2021 which showed kidney stones and gallstones                "

## 2023-06-01 ENCOUNTER — MYC MEDICAL ADVICE (OUTPATIENT)
Dept: FAMILY MEDICINE | Facility: CLINIC | Age: 53
End: 2023-06-01
Payer: COMMERCIAL

## 2023-06-06 ENCOUNTER — OFFICE VISIT (OUTPATIENT)
Dept: FAMILY MEDICINE | Facility: CLINIC | Age: 53
End: 2023-06-06
Payer: COMMERCIAL

## 2023-06-06 VITALS
SYSTOLIC BLOOD PRESSURE: 121 MMHG | TEMPERATURE: 98.2 F | HEIGHT: 74 IN | BODY MASS INDEX: 28.52 KG/M2 | WEIGHT: 222.2 LBS | DIASTOLIC BLOOD PRESSURE: 82 MMHG | OXYGEN SATURATION: 98 % | HEART RATE: 54 BPM

## 2023-06-06 DIAGNOSIS — E55.9 VITAMIN D DEFICIENCY: ICD-10-CM

## 2023-06-06 DIAGNOSIS — E21.3 HYPERPARATHYROIDISM (H): ICD-10-CM

## 2023-06-06 DIAGNOSIS — E21.0 PRIMARY HYPERPARATHYROIDISM (H): ICD-10-CM

## 2023-06-06 DIAGNOSIS — K21.9 GASTROESOPHAGEAL REFLUX DISEASE, UNSPECIFIED WHETHER ESOPHAGITIS PRESENT: ICD-10-CM

## 2023-06-06 DIAGNOSIS — Z00.00 ROUTINE GENERAL MEDICAL EXAMINATION AT A HEALTH CARE FACILITY: Primary | ICD-10-CM

## 2023-06-06 DIAGNOSIS — E66.3 OVERWEIGHT (BMI 25.0-29.9): ICD-10-CM

## 2023-06-06 LAB
ALBUMIN SERPL BCG-MCNC: 4.2 G/DL (ref 3.5–5.2)
ALP SERPL-CCNC: 44 U/L (ref 40–129)
ALT SERPL W P-5'-P-CCNC: 18 U/L (ref 10–50)
ANION GAP SERPL CALCULATED.3IONS-SCNC: 11 MMOL/L (ref 7–15)
AST SERPL W P-5'-P-CCNC: 21 U/L (ref 10–50)
BILIRUB SERPL-MCNC: 0.4 MG/DL
BUN SERPL-MCNC: 14.2 MG/DL (ref 6–20)
CALCIUM SERPL-MCNC: 8.9 MG/DL (ref 8.6–10)
CHLORIDE SERPL-SCNC: 107 MMOL/L (ref 98–107)
CHOLEST SERPL-MCNC: 186 MG/DL
CREAT SERPL-MCNC: 0.96 MG/DL (ref 0.67–1.17)
DEPRECATED CALCIDIOL+CALCIFEROL SERPL-MC: 25 UG/L (ref 20–75)
DEPRECATED HCO3 PLAS-SCNC: 23 MMOL/L (ref 22–29)
GFR SERPL CREATININE-BSD FRML MDRD: >90 ML/MIN/1.73M2
GLUCOSE SERPL-MCNC: 102 MG/DL (ref 70–99)
HDLC SERPL-MCNC: 57 MG/DL
LDLC SERPL CALC-MCNC: 113 MG/DL
NONHDLC SERPL-MCNC: 129 MG/DL
POTASSIUM SERPL-SCNC: 4.5 MMOL/L (ref 3.4–5.3)
PROT SERPL-MCNC: 6.8 G/DL (ref 6.4–8.3)
PTH-INTACT SERPL-MCNC: 60 PG/ML (ref 15–65)
SODIUM SERPL-SCNC: 141 MMOL/L (ref 136–145)
TRIGL SERPL-MCNC: 78 MG/DL

## 2023-06-06 PROCEDURE — 80053 COMPREHEN METABOLIC PANEL: CPT | Performed by: FAMILY MEDICINE

## 2023-06-06 PROCEDURE — 90750 HZV VACC RECOMBINANT IM: CPT | Performed by: FAMILY MEDICINE

## 2023-06-06 PROCEDURE — 99396 PREV VISIT EST AGE 40-64: CPT | Mod: 25 | Performed by: FAMILY MEDICINE

## 2023-06-06 PROCEDURE — 83970 ASSAY OF PARATHORMONE: CPT | Performed by: FAMILY MEDICINE

## 2023-06-06 PROCEDURE — 80061 LIPID PANEL: CPT | Performed by: FAMILY MEDICINE

## 2023-06-06 PROCEDURE — 36415 COLL VENOUS BLD VENIPUNCTURE: CPT | Performed by: FAMILY MEDICINE

## 2023-06-06 PROCEDURE — 90471 IMMUNIZATION ADMIN: CPT | Performed by: FAMILY MEDICINE

## 2023-06-06 PROCEDURE — 82306 VITAMIN D 25 HYDROXY: CPT | Performed by: FAMILY MEDICINE

## 2023-06-06 ASSESSMENT — ENCOUNTER SYMPTOMS
HEADACHES: 0
FREQUENCY: 0
PALPITATIONS: 0
DIARRHEA: 0
COUGH: 0
JOINT SWELLING: 0
CONSTIPATION: 0
EYE PAIN: 0
ABDOMINAL PAIN: 0
HEMATOCHEZIA: 0
FEVER: 0
CHILLS: 0
SHORTNESS OF BREATH: 0
SORE THROAT: 0
HEMATURIA: 0
NAUSEA: 0
ARTHRALGIAS: 0
DIZZINESS: 0
PARESTHESIAS: 0
HEARTBURN: 0
DYSURIA: 0
MYALGIAS: 0
NERVOUS/ANXIOUS: 0
WEAKNESS: 0

## 2023-06-06 NOTE — PROGRESS NOTES
SUBJECTIVE:   CC: Jacoby is an 52 year old who presents for a preventative health visit and follow-up on some baseline health conditions.       6/6/2023     7:00 AM   Additional Questions   Roomed by Sasha   Accompanied by Kevin         6/6/2023     7:00 AM   Patient Reported Additional Medications   Patient reports taking the following new medications None     Healthy Habits:    Getting at least 3 servings of Calcium per day:  NO    Bi-annual eye exam:  Yes    Dental care twice a year:  NO    Sleep apnea or symptoms of sleep apnea:  Daytime drowsiness    Diet:  Regular (no restrictions)    Frequency of exercise:  2-3 days/week    Duration of exercise:  Less than 15 minutes    Taking medications regularly:  Yes    PHQ-2 Total Score:    Additional concerns today:  No    His back discomfort is gotten better from when I last saw him a couple months ago.  See previous note on that.  He did come in fasting today for lab work.  He has some labs ordered from his endocrinologist.  He is generally feeling well.  He is trying to stay physically active.  He is playing golf a fair amount and enjoys that.  He is not taking any routine meds currently.  He has taken omeprazole at times in the past for GERD and also a vitamin D supplement previously, but not currently.        Social History     Tobacco Use     Smoking status: Former     Smokeless tobacco: Former     Types: Chew     Quit date: 9/1/2018   Vaping Use     Vaping status: Never Used   Substance Use Topics     Alcohol use: Yes     Alcohol/week: 0.0 standard drinks of alcohol     Comment: occasional beer - 0-3 peer week            6/6/2023     6:49 AM   Alcohol Use   Prescreen: >3 drinks/day or >7 drinks/week? No         7/12/2019    12:31 PM   AUDIT - Alcohol Use Disorders Identification Test - Reproduced from the World Health Organization Audit 2001 (Second Edition)   1.  How often do you have a drink containing alcohol? 4 or more times a week   2.  How many drinks  containing alcohol do you have on a typical day when you are drinking? 1 or 2   3.  How often do you have five or more drinks on one occasion? Never   4.  How often during the last year have you found that you were not able to stop drinking once you had started? Never   5.  How often during the last year have you failed to do what was normally expected of you because of drinking? Never   6.  How often during the last year have you needed a first drink in the morning to get yourself going after a heavy drinking session? Never   7.  How often during the last year have you had a feeling of guilt or remorse after drinking? Never   8.  How often during the last year have you been unable to remember what happened the night before because of your drinking? Never   9.  Have you or someone else been injured because of your drinking? No   10. Has a relative, friend, doctor or other health care worker been concerned about your drinking or suggested you cut down? No   TOTAL SCORE 4       Last PSA:   PSA   Date Value Ref Range Status   07/17/2018 0.45 0 - 4 ug/L Final     Comment:     Assay Method:  Chemiluminescence using Siemens Vista analyzer     Prostate Specific Antigen Screen   Date Value Ref Range Status   09/09/2021 0.50 0.00 - 3.50 ug/L Final       Reviewed orders with patient. Reviewed health maintenance and updated orders accordingly - Yes  Patient Active Problem List   Diagnosis     Gastroesophageal reflux disease, esophagitis presence not specified     Overweight (BMI 25.0-29.9)     Multiple benign nevi     Vitamin D deficiency     Nephrolithiasis     TMJ (temporomandibular joint syndrome)     Hyperparathyroidism (H)     Right elbow pain     Calculus of gallbladder without cholecystitis without obstruction     Past Surgical History:   Procedure Laterality Date     APPENDECTOMY  1983     PARATHYROIDECTOMY N/A 10/18/2022    Procedure: NECK EXPLORATION WITH RIGHT PARATHYROID ADENOMA;  Surgeon: Judit Villagomez MD;   Location: [facility] OR       Social History     Tobacco Use     Smoking status: Former     Smokeless tobacco: Former     Types: Chew     Quit date: 2018   Vaping Use     Vaping status: Never Used   Substance Use Topics     Alcohol use: Yes     Alcohol/week: 0.0 standard drinks of alcohol     Comment: occasional beer - 0-3 peer week      Family History   Problem Relation Age of Onset     Lipids Mother      Hypertension Father          copd 81     Macular Degeneration Father      Diabetes Father      Chronic Obstructive Pulmonary Disease Father      Cerebrovascular Disease Paternal Half-Brother      Diabetes Paternal Half-Brother      Thyroid Disease No family hx of      Glaucoma No family hx of      Cancer No family hx of          Current Outpatient Medications   Medication Sig Dispense Refill     omeprazole (PRILOSEC) 20 MG DR capsule Take 1 capsule (20 mg) by mouth daily as needed for GERD 90 capsule 1     vitamin D3 (CHOLECALCIFEROL) 50 mcg (2000 units) tablet Take 1 tablet by mouth daily       Allergies   Allergen Reactions     Pcn [Penicillins]        Reviewed and updated as needed this visit by clinical staff    Allergies  Meds              Reviewed and updated as needed this visit by Provider                     Review of Systems   Constitutional: Negative for chills and fever.   HENT: Negative for congestion, ear pain, hearing loss and sore throat.    Eyes: Positive for visual disturbance. Negative for pain.   Respiratory: Negative for cough and shortness of breath.    Cardiovascular: Negative for chest pain, palpitations and peripheral edema.   Gastrointestinal: Negative for abdominal pain, constipation, diarrhea, heartburn, hematochezia and nausea.   Genitourinary: Negative for dysuria, frequency, genital sores, hematuria, impotence, penile discharge and urgency.   Musculoskeletal: Negative for arthralgias, joint swelling and myalgias.   Skin: Negative for rash.   Neurological: Negative for dizziness,  "weakness, headaches and paresthesias.   Psychiatric/Behavioral: Negative for mood changes. The patient is not nervous/anxious.          OBJECTIVE:   /82   Pulse 54   Temp 98.2  F (36.8  C) (Oral)   Ht 1.889 m (6' 2.37\")   Wt 100.8 kg (222 lb 3.2 oz)   SpO2 98%   BMI 28.25 kg/m      Physical Exam  GENERAL: healthy, alert and no distress  EYES: Eyes grossly normal to inspection, PERRL and conjunctivae and sclerae normal  HENT: Grossly normal  NECK: no adenopathy, no asymmetry, masses, or scars and thyroid normal to palpation  RESP: lungs clear to auscultation - no rales, rhonchi or wheezes  CV: regular rate and rhythm, normal S1 S2, no S3 or S4, no murmur, click or rub, no peripheral edema and peripheral pulses strong  ABDOMEN: soft, nontender, no hepatosplenomegaly, no masses   MS: no gross musculoskeletal defects noted, no edema  SKIN: no suspicious lesions or rashes, but numerous benign-appearing nevi, especially on his trunk  NEURO: Normal strength and tone, mentation intact and speech normal  PSYCH: mentation appears normal, affect normal/bright    Diagnostic Test Results:  Labs reviewed in Epic    ASSESSMENT/PLAN:       ICD-10-CM    1. Routine general medical examination at a health care facility  Z00.00 Lipid panel reflex to direct LDL Fasting     Lipid panel reflex to direct LDL Fasting      2. Gastroesophageal reflux disease, unspecified whether esophagitis present  K21.9 omeprazole (PRILOSEC) 20 MG DR capsule      3. Overweight (BMI 25.0-29.9)  E66.3       4. Vitamin D deficiency  E55.9       5. Hyperparathyroidism (H)  E21.3       6. Primary hyperparathyroidism (H)  E21.0 Vitamin D Deficiency (D3 Only)     Comprehensive metabolic panel     Parathyroid Hormone Intact        Blood pressure and other vital signs look good  We discussed the above items  We will check fasting labs today as above  We will give him a Shingrix vaccine  He was advised to return in 2 to 6 months to a local pharmacy for " Shingrix booster  Plan a tentative recheck in 1 year, or sooner prn      COUNSELING:   Reviewed preventive health counseling, as reflected in patient instructions       Regular exercise       Healthy diet/nutrition       Immunizations    Vaccinated for: Zoster              He reports that he has quit smoking. He quit smokeless tobacco use about 4 years ago.  His smokeless tobacco use included chew.        Branden Esquivel MD  [facility]

## 2023-06-07 NOTE — RESULT ENCOUNTER NOTE
Jacoby,  Your cholesterol values continue to look good.  The rest of your lab results will go to your endocrinologist, but as you may have seen via your MyChart, they also are looking good with your parathyroid hormone level back down into the normal range (and your calcium level normal as well).    Branden Esquivel MD

## 2023-11-28 ENCOUNTER — OFFICE VISIT (OUTPATIENT)
Dept: OPTOMETRY | Facility: CLINIC | Age: 53
End: 2023-11-28
Payer: COMMERCIAL

## 2023-11-28 DIAGNOSIS — H52.4 PRESBYOPIA: ICD-10-CM

## 2023-11-28 DIAGNOSIS — H52.223 REGULAR ASTIGMATISM OF BOTH EYES: ICD-10-CM

## 2023-11-28 DIAGNOSIS — H43.813 PVD (POSTERIOR VITREOUS DETACHMENT), BILATERAL: Primary | ICD-10-CM

## 2023-11-28 DIAGNOSIS — H52.13 HIGH MYOPIA, BOTH EYES: ICD-10-CM

## 2023-11-28 PROCEDURE — 92015 DETERMINE REFRACTIVE STATE: CPT | Performed by: OPTOMETRIST

## 2023-11-28 PROCEDURE — 92014 COMPRE OPH EXAM EST PT 1/>: CPT | Performed by: OPTOMETRIST

## 2023-11-28 ASSESSMENT — REFRACTION_MANIFEST
OD_AXIS: 007
OS_SPHERE: -8.00
OD_CYLINDER: +2.75
OS_CYLINDER: +2.00
OS_ADD: +2.75
OD_ADD: +2.75
OS_AXIS: 165
OD_SPHERE: -9.50

## 2023-11-28 ASSESSMENT — EXTERNAL EXAM - LEFT EYE: OS_EXAM: NORMAL

## 2023-11-28 ASSESSMENT — VISUAL ACUITY
OS_SC: 20/20
OD_SC: 20/20
OD_SC: CF @ 3'
OS_CC: 20/25
OS_CC+: -2
CORRECTION_TYPE: GLASSES
OD_CC+: -2
OD_CC: 20/25
OD_CC: 20/30
OS_CC: 20/40
METHOD: SNELLEN - LINEAR
OS_SC: CF @ 3'

## 2023-11-28 ASSESSMENT — CONF VISUAL FIELD
OS_NORMAL: 1
METHOD: COUNTING FINGERS
OS_SUPERIOR_TEMPORAL_RESTRICTION: 0
OS_INFERIOR_TEMPORAL_RESTRICTION: 0
OD_NORMAL: 1
OD_SUPERIOR_NASAL_RESTRICTION: 0
OD_INFERIOR_NASAL_RESTRICTION: 0
OD_SUPERIOR_TEMPORAL_RESTRICTION: 0
OS_INFERIOR_NASAL_RESTRICTION: 0
OS_SUPERIOR_NASAL_RESTRICTION: 0
OD_INFERIOR_TEMPORAL_RESTRICTION: 0

## 2023-11-28 ASSESSMENT — TONOMETRY
OD_IOP_MMHG: 20
IOP_METHOD: APPLANATION
OS_IOP_MMHG: 20

## 2023-11-28 ASSESSMENT — SLIT LAMP EXAM - LIDS
COMMENTS: NORMAL
COMMENTS: NORMAL

## 2023-11-28 ASSESSMENT — REFRACTION_WEARINGRX
SPECS_TYPE: PAL
OD_CYLINDER: +3.00
OS_SPHERE: -8.50
OD_SPHERE: -9.50
OS_ADD: +2.50
OS_AXIS: 172
OD_AXIS: 002
OS_CYLINDER: +2.25
OD_ADD: +2.50

## 2023-11-28 ASSESSMENT — CUP TO DISC RATIO
OD_RATIO: 0.6
OS_RATIO: 0.6

## 2023-11-28 ASSESSMENT — EXTERNAL EXAM - RIGHT EYE: OD_EXAM: NORMAL

## 2023-11-28 NOTE — PROGRESS NOTES
"Chief Complaint   Patient presents with    Annual Eye Exam      -Family hx of AMD (father - wet)    -Floaters each eye - seem to be worsening (-)flashes      Last Eye Exam: 5/31/2022  Dilated Previously: Yes, side effects of dilation explained today    What are you currently using to see?  Glasses - PAL's - wears full time     -May be interested in work Rx      Distance Vision Acuity: Satisfied with vision    Near Vision Acuity: Not satisfied - harder time reading with glasses on     Eye Comfort: good overall - allergies   Do you use eye drops? : No  Occupation or Hobbies:      Hanane Berman  Optometry Assistant        Medical, surgical and family histories reviewed and updated 11/28/2023.       OBJECTIVE: See Ophthalmology exam    ASSESSMENT:    ICD-10-CM    1. PVD (posterior vitreous detachment), bilateral  H43.813       2. High myopia, both eyes  H52.13       3. Regular astigmatism of both eyes  H52.223       4. Presbyopia  H52.4           PLAN:     Patient Instructions   You have a PVD- posterior vitreous detachment which is due to the gel of the eye shrinking and clumping together.  This can sometimes cause holes or tears in the retina.  The signs of a retinal detachment are flashes of light or a \"curtain veil\" coming over your vision. If you notice any of these changes return to clinic for re-evaluation.     I recommend using artificial tears for your dry eye. There are over the counter drops that work well and may be used up to 4x daily (Systane , Refresh, Thera Tears)- avoid \"get the red out\" drops. If you need more than 4 drops daily, use a preservative free product which come in individual vials and may be used for 24 hours until finished and discarded.    Updated glasses prescription provided today.   Allow 2 weeks to adapt to the new glasses.     Return in 1 year for a comprehensive eye exam, or sooner if needed.      The effects of the dilating drops last for 4- 6 hours.  You will be " more sensitive to light and vision will be blurry up close.  Mydriatic sunglasses were given if needed.     Haris Campbell, OD  [address]  [place]  [address] [phone]

## 2023-11-28 NOTE — PATIENT INSTRUCTIONS
"You have a PVD- posterior vitreous detachment which is due to the gel of the eye shrinking and clumping together.  This can sometimes cause holes or tears in the retina.  The signs of a retinal detachment are flashes of light or a \"curtain veil\" coming over your vision. If you notice any of these changes return to clinic for re-evaluation.     I recommend using artificial tears for your dry eye. There are over the counter drops that work well and may be used up to 4x daily (Systane , Refresh, Thera Tears)- avoid \"get the red out\" drops. If you need more than 4 drops daily, use a preservative free product which come in individual vials and may be used for 24 hours until finished and discarded.    Updated glasses prescription provided today.   Allow 2 weeks to adapt to the new glasses.     Return in 1 year for a comprehensive eye exam, or sooner if needed.      The effects of the dilating drops last for 4- 6 hours.  You will be more sensitive to light and vision will be blurry up close.  Mydriatic sunglasses were given if needed.     Haris Campbell, OD  [facility]  [address] [place] [address]. [place]  [id number]    [phone]   "

## 2024-05-31 ENCOUNTER — NURSE TRIAGE (OUTPATIENT)
Dept: FAMILY MEDICINE | Facility: CLINIC | Age: 54
End: 2024-05-31

## 2024-05-31 NOTE — TELEPHONE ENCOUNTER
Patient calling regarding neck pain and stiffness, which is possibly causing vertigo. He has TMJ with stress, he clenches his teeth often. He is also having a lot of tension in the neck, and he notices there is tightness on his right side of his neck and jaw. Patient had molar on his right side removed. Patient unsure if it is infected. Patient explains his symptoms have been going on for the past few weeks.     Patient unsure if this is related to teeth pain. Patient explains he discontinued taking medications two months ago. He also is having ringing in his ear.     He describes his pain has been constant. He rates pain as a 3/10. He describes that it is more stiffness than tight. Able to still touch chin to chest. He denies any pain radiating down to arms and legs. He has been experiencing hot flashes at times.    Patient denies any additional symptoms.    Advised to patient that he should be seen within the next few days. Offered available appointments within time frame. Patient currently has appointment scheduled on 6/4 at 7 am scheduled. Patient plans on keeping this scheduled, and stating he will plan on going to  in the meantime if it worsens at all.     SUSAN Melendez RN  [facility]  Reason for Disposition   Neck pain persists > 2 weeks    Additional Information   Negative: Shock suspected (e.g., cold/pale/clammy skin, too weak to stand, low BP, rapid pulse)   Negative: Similar pain previously and it was from 'heart attack'   Negative: Similar pain previously from 'angina' and not relieved by nitroglycerin   Negative: Difficult to awaken or acting confused (e.g., disoriented, slurred speech)   Negative: Sounds like a life-threatening emergency to the triager   Negative: Followed an injury to neck (e.g., MVA, sports, impact or collision)   Negative: Chest pain   Negative: Lymph node in the neck is swollen or painful to the touch   Negative: Sore throat is main symptom   Negative:  Difficulty breathing or unusual sweating (e.g., sweating without exertion)   Negative: Chest pain lasting longer than 5 minutes   Negative: Stiff neck (can't touch chin to chest) and has headache   Negative: Stiff neck (can't touch chin to chest) and fever   Negative: Weakness of an arm or hand   Negative: Problems with bowel or bladder control   Negative: Patient sounds very sick or weak to the triager   Negative: SEVERE pain (e.g., excruciating, unable to do any normal activities)   Negative: Head is twisting to one side (or ask 'is it turning against your will?')   Negative: Fever > 103 F (39.4 C)   Negative: Fever > 100.0 F (37.8 C) and Intravenous Drug Use (IVDU)   Negative: Fever > 100.0 F (37.8 C) and has diabetes mellitus or a weak immune system (e.g., HIV positive, cancer chemotherapy, organ transplant, splenectomy, chronic steroids)   Negative: Numbness in an arm or hand (i.e., loss of sensation)   Negative: Painful rash with multiple small blisters grouped together (i.e., dermatomal distribution or 'band' or 'stripe')   Negative: High-risk adult (e.g., history of cancer, HIV, or IV Drug Use)   Negative: Patient wants to be seen   Negative: Tenderness in front of neck over windpipe   Negative: MODERATE neck pain (e.g., interferes with normal activities like work or school)   Negative: Pain shoots (radiates) into arm or hand    Protocols used: Neck Pain or Kpuzkojan-A-AD

## 2024-06-04 ENCOUNTER — OFFICE VISIT (OUTPATIENT)
Dept: FAMILY MEDICINE | Facility: CLINIC | Age: 54
End: 2024-06-04
Payer: COMMERCIAL

## 2024-06-04 VITALS
DIASTOLIC BLOOD PRESSURE: 84 MMHG | SYSTOLIC BLOOD PRESSURE: 126 MMHG | BODY MASS INDEX: 29.55 KG/M2 | OXYGEN SATURATION: 96 % | TEMPERATURE: 97.8 F | HEIGHT: 74 IN | WEIGHT: 230.25 LBS | RESPIRATION RATE: 18 BRPM | HEART RATE: 72 BPM

## 2024-06-04 DIAGNOSIS — Z86.39 HISTORY OF HYPERPARATHYROIDISM: ICD-10-CM

## 2024-06-04 DIAGNOSIS — R53.83 FATIGUE, UNSPECIFIED TYPE: ICD-10-CM

## 2024-06-04 DIAGNOSIS — K21.9 GASTROESOPHAGEAL REFLUX DISEASE, UNSPECIFIED WHETHER ESOPHAGITIS PRESENT: Primary | ICD-10-CM

## 2024-06-04 DIAGNOSIS — S46.812A STRAIN OF LEFT TRAPEZIUS MUSCLE, INITIAL ENCOUNTER: ICD-10-CM

## 2024-06-04 DIAGNOSIS — R22.1 NECK SWELLING: ICD-10-CM

## 2024-06-04 LAB
ALBUMIN SERPL BCG-MCNC: 4.3 G/DL (ref 3.5–5.2)
ALP SERPL-CCNC: 49 U/L (ref 40–150)
ALT SERPL W P-5'-P-CCNC: 26 U/L (ref 0–70)
ANION GAP SERPL CALCULATED.3IONS-SCNC: 12 MMOL/L (ref 7–15)
AST SERPL W P-5'-P-CCNC: 23 U/L (ref 0–45)
BASOPHILS # BLD AUTO: 0 10E3/UL (ref 0–0.2)
BASOPHILS NFR BLD AUTO: 1 %
BILIRUB SERPL-MCNC: 0.5 MG/DL
BUN SERPL-MCNC: 13.9 MG/DL (ref 6–20)
CALCIUM SERPL-MCNC: 8.9 MG/DL (ref 8.6–10)
CHLORIDE SERPL-SCNC: 108 MMOL/L (ref 98–107)
CREAT SERPL-MCNC: 1.08 MG/DL (ref 0.67–1.17)
DEPRECATED HCO3 PLAS-SCNC: 22 MMOL/L (ref 22–29)
EGFRCR SERPLBLD CKD-EPI 2021: 81 ML/MIN/1.73M2
EOSINOPHIL # BLD AUTO: 0.3 10E3/UL (ref 0–0.7)
EOSINOPHIL NFR BLD AUTO: 6 %
ERYTHROCYTE [DISTWIDTH] IN BLOOD BY AUTOMATED COUNT: 13.9 % (ref 10–15)
GLUCOSE SERPL-MCNC: 100 MG/DL (ref 70–99)
HCT VFR BLD AUTO: 42.7 % (ref 40–53)
HGB BLD-MCNC: 13.8 G/DL (ref 13.3–17.7)
IMM GRANULOCYTES # BLD: 0 10E3/UL
IMM GRANULOCYTES NFR BLD: 0 %
LYMPHOCYTES # BLD AUTO: 1.7 10E3/UL (ref 0.8–5.3)
LYMPHOCYTES NFR BLD AUTO: 29 %
MCH RBC QN AUTO: 27.9 PG (ref 26.5–33)
MCHC RBC AUTO-ENTMCNC: 32.3 G/DL (ref 31.5–36.5)
MCV RBC AUTO: 86 FL (ref 78–100)
MONOCYTES # BLD AUTO: 0.4 10E3/UL (ref 0–1.3)
MONOCYTES NFR BLD AUTO: 7 %
NEUTROPHILS # BLD AUTO: 3.4 10E3/UL (ref 1.6–8.3)
NEUTROPHILS NFR BLD AUTO: 57 %
PLATELET # BLD AUTO: 199 10E3/UL (ref 150–450)
POTASSIUM SERPL-SCNC: 4.6 MMOL/L (ref 3.4–5.3)
PROT SERPL-MCNC: 6.6 G/DL (ref 6.4–8.3)
PTH-INTACT SERPL-MCNC: 43 PG/ML (ref 15–65)
RBC # BLD AUTO: 4.95 10E6/UL (ref 4.4–5.9)
SODIUM SERPL-SCNC: 142 MMOL/L (ref 135–145)
T4 FREE SERPL-MCNC: 0.9 NG/DL (ref 0.9–1.7)
TSH SERPL DL<=0.005 MIU/L-ACNC: 5.54 UIU/ML (ref 0.3–4.2)
WBC # BLD AUTO: 5.9 10E3/UL (ref 4–11)

## 2024-06-04 PROCEDURE — 99214 OFFICE O/P EST MOD 30 MIN: CPT | Performed by: FAMILY MEDICINE

## 2024-06-04 PROCEDURE — 36415 COLL VENOUS BLD VENIPUNCTURE: CPT | Performed by: FAMILY MEDICINE

## 2024-06-04 PROCEDURE — 84439 ASSAY OF FREE THYROXINE: CPT | Performed by: FAMILY MEDICINE

## 2024-06-04 PROCEDURE — 80053 COMPREHEN METABOLIC PANEL: CPT | Performed by: FAMILY MEDICINE

## 2024-06-04 PROCEDURE — 83970 ASSAY OF PARATHORMONE: CPT | Performed by: FAMILY MEDICINE

## 2024-06-04 PROCEDURE — 84443 ASSAY THYROID STIM HORMONE: CPT | Performed by: FAMILY MEDICINE

## 2024-06-04 PROCEDURE — 85025 COMPLETE CBC W/AUTO DIFF WBC: CPT | Performed by: FAMILY MEDICINE

## 2024-06-04 ASSESSMENT — PAIN SCALES - GENERAL: PAINLEVEL: NO PAIN (0)

## 2024-06-04 NOTE — PATIENT INSTRUCTIONS
We will send you lab results    Schedule upper scope    Schedule CT soft tissue, can cancel if symptoms resolve

## 2024-06-04 NOTE — PROGRESS NOTES
"      Subjective   Jacoby is a 53 year old, presenting for the following health issues:  Neck Pain        6/4/2024     6:50 AM   Additional Questions   Roomed by Emmanuelle Burnette     History of Present Illness       Reason for visit:  Neck throat vertigo  Symptom onset:  More than a month  Symptom intensity:  Moderate  Symptom progression:  Staying the same  Had these symptoms before:  No    He eats 0-1 servings of fruits and vegetables daily.He consumes 0 sweetened beverage(s) daily.He exercises with enough effort to increase his heart rate 20 to 29 minutes per day.  He exercises with enough effort to increase his heart rate 5 days per week.          Left side neck/ trapezius area    Had vertigo a couple weeks ago, bad  Resolved quickly    No arm pain     Had tooth  pulled , healed up but then 6 weeks ago eating chips and dip, jabbed it with chip    Dental appointment in a little over a week    No pain on chewing food now    Clench time teeth just during day    Patient points to left anterior neck when asked where main symptoms are     Staying same     Watching caffeine    Mostly  water    Swallowing okay    Occasional gerd    Was on heartburn med for a while            Objective    BP (!) 138/92 (BP Location: Left arm, Patient Position: Chair, Cuff Size: Adult Regular)   Pulse 72   Temp 97.8  F (36.6  C) (Temporal)   Resp 18   Ht 1.889 m (6' 2.37\")   Wt 104.4 kg (230 lb 4 oz)   SpO2 96%   BMI 29.27 kg/m    Body mass index is 29.27 kg/m .  Physical Exam    Full physical not done     Mentation and affect are fine    No tremor of speech or extremity    Patient has no point tenderness over left trapezius but points to this area when asked where some of pain is    He points to left anterolateral neck area when asked where is is most bothersome  He feels it is swollen here; on exam no obvious assymetry    No tenderness left lower dental area where tooth was extracted    No maxillary or mandibular tenderness    A bit " tender left submandibular area    Cranial nerves fine      Neck range of motion okay     No radiculopathy    Bppv test neg    Romberg, standing on one leg, and heel toe walk all normal    ASSESSMENT / PLAN:  (K21.9) Gastroesophageal reflux disease, unspecified whether esophagitis present  (primary encounter diagnosis)  Comment: patient has had gerd off and on for many years.  Not on acid reducer.  Good to do baseline egd.  Patient to schedule. Up to date on colonoscopy.   Plan: Adult GI  Referral - Procedure Only             (R22.1) Neck swelling  Comment: patient advised to schedule this for 2-3 weeks out.  Cancel if symptoms resolve.   Plan: CT Soft Tissue Neck w Contrast             (Z86.39) History of hyperparathyroidism  Comment: as above.  Check labs also.   Plan: CT Soft Tissue Neck w Contrast, TSH with free         T4 reflex             (R53.83) Fatigue, unspecified type  Comment: check labs   Plan: CBC with Platelets & Differential,         Comprehensive metabolic panel, Parathyroid         Hormone Intact             (S46.812A) Strain of left trapezius muscle, initial encounter  Comment: may be partially from muscle strain  Plan: work on range of motion, follow up prn.      I reviewed the patient's medications, allergies, medical history, family history, and social history.    Eben Vásquez MD               Signed Electronically by: Eben Vásquez MD

## 2024-06-05 NOTE — RESULT ENCOUNTER NOTE
One thyroid test ( TSH ) is a bit off but the follow up test ( free T4 ) is normal, so no thyroid medication needed.    Parathyroid hormone level is normal.    Other labs are okay.    Eben Vásquez MD

## 2024-08-03 ENCOUNTER — HEALTH MAINTENANCE LETTER (OUTPATIENT)
Age: 54
End: 2024-08-03

## 2024-12-03 ENCOUNTER — PATIENT OUTREACH (OUTPATIENT)
Dept: CARE COORDINATION | Facility: CLINIC | Age: 54
End: 2024-12-03
Payer: COMMERCIAL

## 2025-08-16 ENCOUNTER — HEALTH MAINTENANCE LETTER (OUTPATIENT)
Age: 55
End: 2025-08-16

## (undated) DEVICE — SU MONOCRYL 5-0 P-3 18" UND Y493G

## (undated) DEVICE — SU CHROMIC 3-0 SH 27" G122H

## (undated) DEVICE — CLIP HORIZON SM RED WIDE SLOT 001201

## (undated) DEVICE — PREP CHLORAPREP W/ORANGE TINT 10.5ML 260715

## (undated) DEVICE — SOL NACL 0.9% IRRIG 500ML BOTTLE 2F7123

## (undated) DEVICE — ESU PENCIL SMOKE EVAC W/ROCKER SWITCH 0703-047-000

## (undated) DEVICE — ADH SKIN CLOSURE PREMIERPRO EXOFIN 1.0ML 3470

## (undated) DEVICE — ESU GROUND PAD ADULT W/CORD E7507

## (undated) DEVICE — SURGICEL FIBRILLAR HEMOSTAT 1"X2" 1961

## (undated) DEVICE — LINEN TOWEL PACK X5 5464

## (undated) DEVICE — SPECIMEN CONTAINER URINE 90ML STERILE 75.1435.002

## (undated) DEVICE — PACK ENT MINOR CUSTOM ASC

## (undated) DEVICE — NIM PROBE PRASS INCREMENTING TIP 8225825

## (undated) DEVICE — SUCTION SLEEVE NEPTUNE 2 125MM 0703-005-125

## (undated) DEVICE — GLOVE PROTEXIS W/NEU-THERA 6.5  2D73TE65

## (undated) DEVICE — CLIP HORIZON MED BLUE 002200

## (undated) DEVICE — SUCTION MANIFOLD NEPTUNE 2 SYS 1 PORT 702-025-000

## (undated) DEVICE — ESU ELEC BLADE 2.75" COATED/INSULATED E1455

## (undated) DEVICE — SOL WATER IRRIG 500ML BOTTLE 2F7113

## (undated) DEVICE — NDL BUTTERFLY 21G .75" 367281

## (undated) RX ORDER — ACETAMINOPHEN 325 MG/1
TABLET ORAL
Status: DISPENSED
Start: 2022-10-18

## (undated) RX ORDER — DEXAMETHASONE SODIUM PHOSPHATE 10 MG/ML
INJECTION, SOLUTION INTRAMUSCULAR; INTRAVENOUS
Status: DISPENSED
Start: 2022-10-18

## (undated) RX ORDER — ONDANSETRON 2 MG/ML
INJECTION INTRAMUSCULAR; INTRAVENOUS
Status: DISPENSED
Start: 2022-10-18

## (undated) RX ORDER — OXYCODONE HYDROCHLORIDE 5 MG/1
TABLET ORAL
Status: DISPENSED
Start: 2022-10-18

## (undated) RX ORDER — LIDOCAINE HYDROCHLORIDE 20 MG/ML
INJECTION, SOLUTION EPIDURAL; INFILTRATION; INTRACAUDAL; PERINEURAL
Status: DISPENSED
Start: 2022-10-18

## (undated) RX ORDER — FENTANYL CITRATE 50 UG/ML
INJECTION, SOLUTION INTRAMUSCULAR; INTRAVENOUS
Status: DISPENSED
Start: 2022-10-18

## (undated) RX ORDER — REMIFENTANIL HYDROCHLORIDE 1 MG/ML
INJECTION, POWDER, LYOPHILIZED, FOR SOLUTION INTRAVENOUS
Status: DISPENSED
Start: 2022-10-18

## (undated) RX ORDER — PROPOFOL 10 MG/ML
INJECTION, EMULSION INTRAVENOUS
Status: DISPENSED
Start: 2022-10-18